# Patient Record
Sex: FEMALE | Race: WHITE | NOT HISPANIC OR LATINO | Employment: OTHER | URBAN - NONMETROPOLITAN AREA
[De-identification: names, ages, dates, MRNs, and addresses within clinical notes are randomized per-mention and may not be internally consistent; named-entity substitution may affect disease eponyms.]

---

## 2017-04-04 ENCOUNTER — LAB (OUTPATIENT)
Dept: LAB | Facility: HOSPITAL | Age: 77
End: 2017-04-04

## 2017-04-04 ENCOUNTER — TRANSCRIBE ORDERS (OUTPATIENT)
Dept: ADMINISTRATIVE | Facility: HOSPITAL | Age: 77
End: 2017-04-04

## 2017-04-04 DIAGNOSIS — E87.5 SERUM POTASSIUM ELEVATED: ICD-10-CM

## 2017-04-04 DIAGNOSIS — E87.5 SERUM POTASSIUM ELEVATED: Primary | ICD-10-CM

## 2017-04-04 LAB
ANION GAP SERPL CALCULATED.3IONS-SCNC: 5.9 MMOL/L (ref 3.6–11.2)
BUN BLD-MCNC: 19 MG/DL (ref 7–21)
BUN/CREAT SERPL: 23.5 (ref 7–25)
CALCIUM SPEC-SCNC: 10.2 MG/DL (ref 7.7–10)
CHLORIDE SERPL-SCNC: 103 MMOL/L (ref 99–112)
CO2 SERPL-SCNC: 29.1 MMOL/L (ref 24.3–31.9)
CREAT BLD-MCNC: 0.81 MG/DL (ref 0.43–1.29)
GFR SERPL CREATININE-BSD FRML MDRD: 68 ML/MIN/1.73
GLUCOSE BLD-MCNC: 171 MG/DL (ref 70–110)
OSMOLALITY SERPL CALC.SUM OF ELEC: 282 MOSM/KG (ref 273–305)
POTASSIUM BLD-SCNC: 4.6 MMOL/L (ref 3.5–5.3)
SODIUM BLD-SCNC: 138 MMOL/L (ref 135–153)

## 2017-04-04 PROCEDURE — 36415 COLL VENOUS BLD VENIPUNCTURE: CPT

## 2017-04-04 PROCEDURE — 80048 BASIC METABOLIC PNL TOTAL CA: CPT | Performed by: NURSE PRACTITIONER

## 2017-04-11 ENCOUNTER — TRANSCRIBE ORDERS (OUTPATIENT)
Dept: LAB | Facility: HOSPITAL | Age: 77
End: 2017-04-11

## 2017-04-11 ENCOUNTER — HOSPITAL ENCOUNTER (OUTPATIENT)
Dept: GENERAL RADIOLOGY | Facility: HOSPITAL | Age: 77
Discharge: HOME | End: 2017-04-11
Admitting: NURSE PRACTITIONER

## 2017-04-11 DIAGNOSIS — M54.5 LOW BACK PAIN, UNSPECIFIED BACK PAIN LATERALITY, UNSPECIFIED CHRONICITY, WITH SCIATICA PRESENCE UNSPECIFIED: ICD-10-CM

## 2017-04-11 DIAGNOSIS — M25.552 LEFT HIP PAIN: ICD-10-CM

## 2017-04-11 DIAGNOSIS — M54.5 LOW BACK PAIN, UNSPECIFIED BACK PAIN LATERALITY, UNSPECIFIED CHRONICITY, WITH SCIATICA PRESENCE UNSPECIFIED: Primary | ICD-10-CM

## 2017-04-11 PROCEDURE — 72110 X-RAY EXAM L-2 SPINE 4/>VWS: CPT

## 2017-04-11 PROCEDURE — 73502 X-RAY EXAM HIP UNI 2-3 VIEWS: CPT

## 2017-04-11 PROCEDURE — 73502 X-RAY EXAM HIP UNI 2-3 VIEWS: CPT | Performed by: RADIOLOGY

## 2017-04-11 PROCEDURE — 72110 X-RAY EXAM L-2 SPINE 4/>VWS: CPT | Performed by: RADIOLOGY

## 2017-06-10 ENCOUNTER — APPOINTMENT (OUTPATIENT)
Dept: GENERAL RADIOLOGY | Facility: HOSPITAL | Age: 77
End: 2017-06-10

## 2017-06-10 ENCOUNTER — HOSPITAL ENCOUNTER (EMERGENCY)
Facility: HOSPITAL | Age: 77
Discharge: HOME | End: 2017-06-10
Attending: EMERGENCY MEDICINE | Admitting: EMERGENCY MEDICINE

## 2017-06-10 VITALS
WEIGHT: 195 LBS | DIASTOLIC BLOOD PRESSURE: 74 MMHG | HEART RATE: 76 BPM | RESPIRATION RATE: 18 BRPM | OXYGEN SATURATION: 98 % | HEIGHT: 65 IN | BODY MASS INDEX: 32.49 KG/M2 | SYSTOLIC BLOOD PRESSURE: 141 MMHG | TEMPERATURE: 98.2 F

## 2017-06-10 DIAGNOSIS — I10 ESSENTIAL HYPERTENSION: Primary | ICD-10-CM

## 2017-06-10 LAB
ALBUMIN SERPL-MCNC: 3.7 G/DL (ref 3.4–4.8)
ALBUMIN/GLOB SERPL: 1.3 G/DL (ref 1.5–2.5)
ALP SERPL-CCNC: 96 U/L (ref 35–104)
ALT SERPL W P-5'-P-CCNC: 19 U/L (ref 10–36)
ANION GAP SERPL CALCULATED.3IONS-SCNC: 3.6 MMOL/L (ref 3.6–11.2)
AST SERPL-CCNC: 22 U/L (ref 10–30)
BASOPHILS # BLD AUTO: 0.07 10*3/MM3 (ref 0–0.3)
BASOPHILS NFR BLD AUTO: 1 % (ref 0–2)
BILIRUB SERPL-MCNC: 0.3 MG/DL (ref 0.2–1.8)
BUN BLD-MCNC: 22 MG/DL (ref 7–21)
BUN/CREAT SERPL: 40 (ref 7–25)
CALCIUM SPEC-SCNC: 10 MG/DL (ref 7.7–10)
CHLORIDE SERPL-SCNC: 100 MMOL/L (ref 99–112)
CO2 SERPL-SCNC: 29.4 MMOL/L (ref 24.3–31.9)
CREAT BLD-MCNC: 0.55 MG/DL (ref 0.43–1.29)
DEPRECATED RDW RBC AUTO: 40.8 FL (ref 37–54)
EOSINOPHIL # BLD AUTO: 0.45 10*3/MM3 (ref 0–0.7)
EOSINOPHIL NFR BLD AUTO: 6.2 % (ref 0–7)
ERYTHROCYTE [DISTWIDTH] IN BLOOD BY AUTOMATED COUNT: 13 % (ref 11.5–14.5)
GFR SERPL CREATININE-BSD FRML MDRD: 107 ML/MIN/1.73
GLOBULIN UR ELPH-MCNC: 2.8 GM/DL
GLUCOSE BLD-MCNC: 115 MG/DL (ref 70–110)
HCT VFR BLD AUTO: 33.9 % (ref 37–47)
HGB BLD-MCNC: 11.2 G/DL (ref 12–16)
IMM GRANULOCYTES # BLD: 0.03 10*3/MM3 (ref 0–0.03)
IMM GRANULOCYTES NFR BLD: 0.4 % (ref 0–0.5)
LYMPHOCYTES # BLD AUTO: 2.13 10*3/MM3 (ref 1–3)
LYMPHOCYTES NFR BLD AUTO: 29.2 % (ref 16–46)
MCH RBC QN AUTO: 28.9 PG (ref 27–33)
MCHC RBC AUTO-ENTMCNC: 33 G/DL (ref 33–37)
MCV RBC AUTO: 87.4 FL (ref 80–94)
MONOCYTES # BLD AUTO: 1.07 10*3/MM3 (ref 0.1–0.9)
MONOCYTES NFR BLD AUTO: 14.7 % (ref 0–12)
NEUTROPHILS # BLD AUTO: 3.54 10*3/MM3 (ref 1.4–6.5)
NEUTROPHILS NFR BLD AUTO: 48.5 % (ref 40–75)
OSMOLALITY SERPL CALC.SUM OF ELEC: 270.6 MOSM/KG (ref 273–305)
PLATELET # BLD AUTO: 313 10*3/MM3 (ref 130–400)
PMV BLD AUTO: 9.8 FL (ref 6–10)
POTASSIUM BLD-SCNC: 3.6 MMOL/L (ref 3.5–5.3)
PROT SERPL-MCNC: 6.5 G/DL (ref 6–8)
RBC # BLD AUTO: 3.88 10*6/MM3 (ref 4.2–5.4)
SODIUM BLD-SCNC: 133 MMOL/L (ref 135–153)
WBC NRBC COR # BLD: 7.29 10*3/MM3 (ref 4.5–12.5)

## 2017-06-10 PROCEDURE — 85025 COMPLETE CBC W/AUTO DIFF WBC: CPT | Performed by: EMERGENCY MEDICINE

## 2017-06-10 PROCEDURE — 99284 EMERGENCY DEPT VISIT MOD MDM: CPT

## 2017-06-10 PROCEDURE — 93005 ELECTROCARDIOGRAM TRACING: CPT | Performed by: EMERGENCY MEDICINE

## 2017-06-10 PROCEDURE — 93010 ELECTROCARDIOGRAM REPORT: CPT | Performed by: INTERNAL MEDICINE

## 2017-06-10 PROCEDURE — 80053 COMPREHEN METABOLIC PANEL: CPT | Performed by: EMERGENCY MEDICINE

## 2017-06-10 PROCEDURE — 71010 HC CHEST PA OR AP: CPT

## 2017-06-10 PROCEDURE — 71010 XR CHEST 1 VW: CPT | Performed by: RADIOLOGY

## 2017-06-10 RX ORDER — SODIUM CHLORIDE 0.9 % (FLUSH) 0.9 %
10 SYRINGE (ML) INJECTION AS NEEDED
Status: DISCONTINUED | OUTPATIENT
Start: 2017-06-10 | End: 2017-06-10 | Stop reason: HOSPADM

## 2017-06-10 NOTE — ED PROVIDER NOTES
Subjective   Patient is a 77 y.o. female presenting with shortness of breath.   History provided by:  Patient  Shortness of Breath   Severity:  Mild  Onset quality:  Gradual  Timing:  Constant  Progression:  Improving  Chronicity:  New  Context: activity    Relieved by:  Lying down  Worsened by:  Nothing  Ineffective treatments:  None tried  Associated symptoms: no abdominal pain, no chest pain and no fever        Review of Systems   Constitutional: Negative for fever.   HENT: Negative.    Respiratory: Positive for shortness of breath.    Cardiovascular: Negative.  Negative for chest pain.   Gastrointestinal: Negative.  Negative for abdominal pain.   Endocrine: Negative.    Genitourinary: Negative.  Negative for dysuria.   Skin: Negative.    Neurological: Positive for weakness.   Psychiatric/Behavioral: Negative.    All other systems reviewed and are negative.      No past medical history on file.    No Known Allergies    No past surgical history on file.    No family history on file.    Social History     Social History   • Marital status:      Spouse name: N/A   • Number of children: N/A   • Years of education: N/A     Social History Main Topics   • Smoking status: Not on file   • Smokeless tobacco: Not on file   • Alcohol use Not on file   • Drug use: Not on file   • Sexual activity: Not on file     Other Topics Concern   • Not on file     Social History Narrative   • No narrative on file           Objective   Physical Exam   Constitutional: She is oriented to person, place, and time. She appears well-developed and well-nourished. No distress.   HENT:   Head: Normocephalic and atraumatic.   Right Ear: External ear normal.   Left Ear: External ear normal.   Nose: Nose normal.   Eyes: Conjunctivae and EOM are normal. Pupils are equal, round, and reactive to light.   Neck: Normal range of motion. Neck supple. No JVD present. No tracheal deviation present.   Cardiovascular: Normal rate, regular rhythm and  normal heart sounds.    No murmur heard.  Pulmonary/Chest: Effort normal and breath sounds normal. No respiratory distress. She has no wheezes.   Abdominal: Soft. Bowel sounds are normal. There is no tenderness.   Musculoskeletal: Normal range of motion. She exhibits no edema or deformity.   Neurological: She is alert and oriented to person, place, and time. No cranial nerve deficit.   Skin: Skin is warm and dry. No rash noted. She is not diaphoretic. No erythema. No pallor.   Psychiatric: She has a normal mood and affect. Her behavior is normal. Thought content normal.   Nursing note and vitals reviewed.      Procedures         ED Course  ED Course                  MDM    Final diagnoses:   Essential hypertension            Nilesh Fischer MD  06/10/17 0214

## 2017-06-11 NOTE — ED NOTES
"Patient states she \"was at home laying down and then all the sudden I couldn't breath, it was like I just couldn't catch my breath.\" Patient states all symptoms have resolved upon assessment and does not voice any complaints at this time.      Camille Alfred RN  06/10/17 6656    "

## 2017-06-21 ENCOUNTER — TRANSCRIBE ORDERS (OUTPATIENT)
Dept: ADMINISTRATIVE | Facility: HOSPITAL | Age: 77
End: 2017-06-21

## 2017-06-21 DIAGNOSIS — R06.02 SOB (SHORTNESS OF BREATH): Primary | ICD-10-CM

## 2017-06-21 DIAGNOSIS — R94.31 ABNORMAL EKG: ICD-10-CM

## 2017-06-22 ENCOUNTER — HOSPITAL ENCOUNTER (OUTPATIENT)
Dept: CARDIOLOGY | Facility: HOSPITAL | Age: 77
Discharge: HOME | End: 2017-06-22
Admitting: NURSE PRACTITIONER

## 2017-06-22 DIAGNOSIS — R94.31 ABNORMAL EKG: ICD-10-CM

## 2017-06-22 DIAGNOSIS — R06.02 SOB (SHORTNESS OF BREATH): ICD-10-CM

## 2017-06-22 PROCEDURE — 93306 TTE W/DOPPLER COMPLETE: CPT

## 2017-06-22 PROCEDURE — 93306 TTE W/DOPPLER COMPLETE: CPT | Performed by: INTERNAL MEDICINE

## 2017-06-26 LAB
BH CV ECHO MEAS - ACS: 1.3 CM
BH CV ECHO MEAS - AO MAX PG (FULL): 3.9 MMHG
BH CV ECHO MEAS - AO MAX PG: 7.5 MMHG
BH CV ECHO MEAS - AO MEAN PG (FULL): 2.1 MMHG
BH CV ECHO MEAS - AO MEAN PG: 4.2 MMHG
BH CV ECHO MEAS - AO ROOT AREA (BSA CORRECTED): 1.4
BH CV ECHO MEAS - AO ROOT AREA: 5.7 CM^2
BH CV ECHO MEAS - AO ROOT DIAM: 2.7 CM
BH CV ECHO MEAS - AO V2 MAX: 137 CM/SEC
BH CV ECHO MEAS - AO V2 MEAN: 97.3 CM/SEC
BH CV ECHO MEAS - AO V2 VTI: 30.2 CM
BH CV ECHO MEAS - BSA(HAYCOCK): 2.1 M^2
BH CV ECHO MEAS - BSA: 2 M^2
BH CV ECHO MEAS - BZI_BMI: 32.7 KILOGRAMS/M^2
BH CV ECHO MEAS - BZI_METRIC_HEIGHT: 165 CM
BH CV ECHO MEAS - BZI_METRIC_WEIGHT: 89 KG
BH CV ECHO MEAS - EDV(CUBED): 120.4 ML
BH CV ECHO MEAS - EDV(MOD-SP2): 79.6 ML
BH CV ECHO MEAS - EDV(MOD-SP4): 97.1 ML
BH CV ECHO MEAS - EDV(TEICH): 114.8 ML
BH CV ECHO MEAS - EF(CUBED): 69.8 %
BH CV ECHO MEAS - EF(TEICH): 61.2 %
BH CV ECHO MEAS - ESV(CUBED): 36.3 ML
BH CV ECHO MEAS - ESV(TEICH): 44.5 ML
BH CV ECHO MEAS - FS: 32.9 %
BH CV ECHO MEAS - IVS/LVPW: 1
BH CV ECHO MEAS - IVSD: 1 CM
BH CV ECHO MEAS - LA DIMENSION: 3.3 CM
BH CV ECHO MEAS - LA/AO: 1.2
BH CV ECHO MEAS - LV DIASTOLIC VOL/BSA (35-75): 49.5 ML/M^2
BH CV ECHO MEAS - LV MASS(C)D: 177.2 GRAMS
BH CV ECHO MEAS - LV MASS(C)DI: 90.3 GRAMS/M^2
BH CV ECHO MEAS - LV MAX PG: 3.6 MMHG
BH CV ECHO MEAS - LV MEAN PG: 2.1 MMHG
BH CV ECHO MEAS - LV V1 MAX: 95.4 CM/SEC
BH CV ECHO MEAS - LV V1 MEAN: 69.2 CM/SEC
BH CV ECHO MEAS - LV V1 VTI: 20.9 CM
BH CV ECHO MEAS - LVIDD: 4.9 CM
BH CV ECHO MEAS - LVIDS: 3.3 CM
BH CV ECHO MEAS - LVPWD: 0.98 CM
BH CV ECHO MEAS - MV A MAX VEL: 102.6 CM/SEC
BH CV ECHO MEAS - MV DEC TIME: 0.19 SEC
BH CV ECHO MEAS - MV E MAX VEL: 76.2 CM/SEC
BH CV ECHO MEAS - MV E/A: 0.74
BH CV ECHO MEAS - MV MAX PG: 4.8 MMHG
BH CV ECHO MEAS - MV MEAN PG: 1.2 MMHG
BH CV ECHO MEAS - MV V2 MAX: 109 CM/SEC
BH CV ECHO MEAS - MV V2 MEAN: 48.8 CM/SEC
BH CV ECHO MEAS - MV V2 VTI: 23.9 CM
BH CV ECHO MEAS - PA ACC TIME: 0.12 SEC
BH CV ECHO MEAS - PA MAX PG: 6.1 MMHG
BH CV ECHO MEAS - PA MEAN PG: 3.1 MMHG
BH CV ECHO MEAS - PA PR(ACCEL): 24.3 MMHG
BH CV ECHO MEAS - PA V2 MAX: 123 CM/SEC
BH CV ECHO MEAS - PA V2 MEAN: 84.3 CM/SEC
BH CV ECHO MEAS - PA V2 VTI: 21.2 CM
BH CV ECHO MEAS - RAP SYSTOLE: 10 MMHG
BH CV ECHO MEAS - RVDD: 2.9 CM
BH CV ECHO MEAS - RVSP: 38.5 MMHG
BH CV ECHO MEAS - SI(AO): 88 ML/M^2
BH CV ECHO MEAS - SI(CUBED): 42.9 ML/M^2
BH CV ECHO MEAS - SI(TEICH): 35.9 ML/M^2
BH CV ECHO MEAS - SV(AO): 172.6 ML
BH CV ECHO MEAS - SV(CUBED): 84 ML
BH CV ECHO MEAS - SV(TEICH): 70.3 ML
BH CV ECHO MEAS - TR MAX VEL: 266.9 CM/SEC

## 2017-07-07 ENCOUNTER — OFFICE VISIT (OUTPATIENT)
Dept: CARDIOLOGY | Facility: CLINIC | Age: 77
End: 2017-07-07

## 2017-07-07 VITALS
WEIGHT: 192 LBS | DIASTOLIC BLOOD PRESSURE: 71 MMHG | SYSTOLIC BLOOD PRESSURE: 120 MMHG | HEART RATE: 79 BPM | RESPIRATION RATE: 18 BRPM | HEIGHT: 65 IN | OXYGEN SATURATION: 95 % | BODY MASS INDEX: 31.99 KG/M2

## 2017-07-07 DIAGNOSIS — I10 ESSENTIAL HYPERTENSION: ICD-10-CM

## 2017-07-07 DIAGNOSIS — I77.9 RIGHT-SIDED CAROTID ARTERY DISEASE (HCC): ICD-10-CM

## 2017-07-07 DIAGNOSIS — E11.59 TYPE 2 DIABETES MELLITUS WITH OTHER CIRCULATORY COMPLICATION, WITHOUT LONG-TERM CURRENT USE OF INSULIN (HCC): ICD-10-CM

## 2017-07-07 DIAGNOSIS — R06.09 DYSPNEA ON EXERTION: Primary | ICD-10-CM

## 2017-07-07 PROCEDURE — 99204 OFFICE O/P NEW MOD 45 MIN: CPT | Performed by: INTERNAL MEDICINE

## 2017-07-07 PROCEDURE — 93000 ELECTROCARDIOGRAM COMPLETE: CPT | Performed by: INTERNAL MEDICINE

## 2017-07-07 RX ORDER — NABUMETONE 750 MG/1
750 TABLET, FILM COATED ORAL 2 TIMES DAILY PRN
COMMUNITY
End: 2018-04-11

## 2017-07-07 RX ORDER — LORATADINE 10 MG/1
10 TABLET ORAL DAILY
Status: ON HOLD | COMMUNITY
End: 2019-01-12

## 2017-07-07 RX ORDER — LUBIPROSTONE 24 UG/1
24 CAPSULE ORAL 2 TIMES DAILY WITH MEALS
COMMUNITY
End: 2018-04-11

## 2017-07-07 RX ORDER — LISINOPRIL AND HYDROCHLOROTHIAZIDE 20; 12.5 MG/1; MG/1
1 TABLET ORAL DAILY
Status: ON HOLD | COMMUNITY
End: 2019-01-12

## 2017-07-07 RX ORDER — IBUPROFEN 200 MG
200 TABLET ORAL AS NEEDED
COMMUNITY
End: 2018-04-11

## 2017-07-07 RX ORDER — ATORVASTATIN CALCIUM 40 MG/1
40 TABLET, FILM COATED ORAL NIGHTLY
Status: ON HOLD | COMMUNITY
End: 2019-01-12

## 2017-07-07 RX ORDER — ASPIRIN 81 MG/1
81 TABLET ORAL
COMMUNITY

## 2017-07-07 RX ORDER — METOPROLOL SUCCINATE 25 MG/1
25 TABLET, EXTENDED RELEASE ORAL
Status: ON HOLD | COMMUNITY
End: 2018-08-18

## 2017-07-07 NOTE — PROGRESS NOTES
"Cristiana Sánchez, APRN  Jackie Erwin  1940 07/07/2017    Patient Active Problem List   Diagnosis   • Dyspnea on exertion associated with chest discomfort, could be angina equalant   • Type 2 diabetes mellitus with circulatory disorder, without long-term current use of insulin   • Essential hypertension   • Right-sided carotid artery disease, status post right carotid endarterectomy in 2011.       Dear Cristiana Sánchez, APRN:    Subjective     Jackie Erwin is a 77 y.o. female with the problems as listed above, presents    History of Present Illness:Ms. Erwin is a very pleasant 77-year-old  female with no previous history of known coronary artery disease but has history of right carotid disease for which she underwent right carotid endarterectomy in 2011, history of type 2 diabetes mellitus and hypertension and dyslipidemia and previous history of smoking although she quit in 1975, presents with complaints of having episodes of indigestion at night associated with shortness of breath and a feeling of \"can't get enough breath\".  She had 2 episodes in the last 3-4 weeks.  She denies any exertional chest pain or discomfort.  She has dyspnea with mild-to-moderate exertion with 1-2 pillow orthopnea.  She states that when she gets up with shortness of breath she has to sit down in chair be able to breathe adequately.  She also has been feeling tired a lot lately.  He complains of frequent dizziness and unsteady gait for a long time.  She denies any associated palpitations or any history of syncope.  She denies having had any strokes in the past except transient numbness on the left side of her body prior to her carotid endarterectomy.    Cardiac risk factors:diabetes mellitus, hypertension, peripheral vascular disease, smoking, Positive family Hx. of premature athersclerotivc disease. and Age and postmenopausal status.    No Known Allergies:      Current Outpatient Prescriptions:   •  aspirin 81 MG EC " tablet, Take 81 mg by mouth Daily., Disp: , Rfl:   •  atorvastatin (LIPITOR) 40 MG tablet, Take 40 mg by mouth Every Night., Disp: , Rfl:   •  ibuprofen (ADVIL,MOTRIN) 200 MG tablet, Take 200 mg by mouth As Needed for Mild Pain (Pain Score 1-3)., Disp: , Rfl:   •  lisinopril-hydrochlorothiazide (PRINZIDE,ZESTORETIC) 20-12.5 MG per tablet, Take 1 tablet by mouth Daily., Disp: , Rfl:   •  loratadine (CLARITIN) 10 MG tablet, Take 10 mg by mouth Daily., Disp: , Rfl:   •  lubiprostone (AMITIZA) 24 MCG capsule, Take 24 mcg by mouth 2 (Two) Times a Day With Meals., Disp: , Rfl:   •  metoprolol succinate XL (TOPROL-XL) 25 MG 24 hr tablet, Take 25 mg by mouth Daily., Disp: , Rfl:   •  nabumetone (RELAFEN) 750 MG tablet, Take 750 mg by mouth 2 (Two) Times a Day As Needed for Mild Pain (Pain Score 1-3)., Disp: , Rfl:     Past Medical History:   Diagnosis Date   • Diabetes mellitus    • Hyperlipidemia    • Hypertension      Past Surgical History:   Procedure Laterality Date   • APPENDECTOMY      6 yrs old   • CAROTID ENDARTERECTOMY     • HYSTERECTOMY     • INGUINAL HERNIA REPAIR  1970     Family History   Problem Relation Age of Onset   • Thrombosis Father      Social History   Substance Use Topics   • Smoking status: Former Smoker     Types: Cigarettes   • Smokeless tobacco: None   • Alcohol use No       Review of Systems   Constitution: Positive for decreased appetite, weakness and malaise/fatigue (has to sit and rest alot, ).   HENT: Negative for headaches, hearing loss and nosebleeds.         Lt ear problems. Drains at night.  Chronic sinus probles       Eyes: Negative for blurred vision and double vision.        Wears glasses     Cardiovascular: Positive for chest pain (2nd episode of dull pain,  not lasting long, ), dyspnea on exertion and orthopnea (gets bad she has to get up and sit in recliner). Negative for irregular heartbeat, leg swelling, near-syncope and palpitations.   Respiratory: Positive for cough, shortness  "of breath and wheezing.    Endocrine: Positive for cold intolerance and heat intolerance.   Skin: Positive for dry skin.   Musculoskeletal: Positive for arthritis, back pain (LBP), joint pain, joint swelling, muscle cramps, muscle weakness and stiffness.        Difficulty walking     Gastrointestinal: Positive for constipation and nausea. Negative for abdominal pain, change in bowel habit, diarrhea and vomiting. Heartburn: mostly every evening for the past couple weeks.   Genitourinary: Negative for bladder incontinence, dysuria and frequency.   Neurological: Positive for dizziness (\"feels like im always dizzy\"), loss of balance and numbness (bilat feet, ?neuropathy). Negative for light-headedness and tremors.   Psychiatric/Behavioral: Negative for depression. The patient has insomnia. The patient is not nervous/anxious.    Allergic/Immunologic: Positive for environmental allergies.       Objective   Blood pressure 120/71, pulse 79, resp. rate 18, height 65\" (165.1 cm), weight 192 lb (87.1 kg), SpO2 95 %.  Body mass index is 31.95 kg/(m^2).        Physical Exam   Constitutional: She is oriented to person, place, and time. She appears well-developed and well-nourished.   HENT:   Head: Normocephalic.   Eyes: Conjunctivae and EOM are normal.   Neck: Normal range of motion. Neck supple. No JVD present. No tracheal deviation present. No thyromegaly present.   Cardiovascular: Normal rate, regular rhythm, S1 normal and S2 normal.  Exam reveals no gallop, no S3, no S4 and no friction rub.    No murmur heard.  Pulses:       Carotid pulses are 2+ on the right side, and 2+ on the left side.       Radial pulses are 2+ on the right side, and 2+ on the left side.        Femoral pulses are 2+ on the right side, and 2+ on the left side.       Popliteal pulses are 2+ on the right side, and 2+ on the left side.        Dorsalis pedis pulses are 2+ on the right side, and 2+ on the left side.        Posterior tibial pulses are 2+ on the " right side, and 2+ on the left side.   Pulmonary/Chest: Breath sounds normal. No respiratory distress. She has no wheezes. She has no rales.   Abdominal: Soft. Bowel sounds are normal. She exhibits no mass. There is no tenderness.   Musculoskeletal: She exhibits edema (mild chronic nonpitting edema on both legs.).   Neurological: She is alert and oriented to person, place, and time. No cranial nerve deficit.   Skin: Skin is warm and dry.   Psychiatric: She has a normal mood and affect.       Lab Results   Component Value Date     (L) 06/10/2017    K 3.6 06/10/2017     06/10/2017    CO2 29.4 06/10/2017    BUN 22 (H) 06/10/2017    CREATININE 0.55 06/10/2017    GLUCOSE 115 (H) 06/10/2017    CALCIUM 10.0 06/10/2017    AST 22 06/10/2017    ALT 19 06/10/2017    ALKPHOS 96 06/10/2017    LABIL2 1.3 (L) 06/10/2017     Lab Results   Component Value Date    CKTOTAL 28 12/17/2015     Lab Results   Component Value Date    WBC 7.29 06/10/2017    HGB 11.2 (L) 06/10/2017    HCT 33.9 (L) 06/10/2017     06/10/2017     No results found for: MG  Lab Results   Component Value Date    TSH 15.309 (H) 12/17/2015    CHLPL 244 (H) 12/17/2015    TRIG 124 12/17/2015    HDL 47 (L) 12/17/2015     (H) 12/17/2015      Lab Results   Component Value Date    BNP 15 12/17/2015     Echo Doppler study 6/22/17 revealed:  · Normal left ventricular cavity size and wall thickness noted. All left ventricular wall segments contract normally.  · Estimated EF appears to be in the range of 61 - 65%.  · The aortic valve is structurally normal. No aortic valve regurgitation is present. No aortic valve stenosis is present.  · The mitral valve is normal in structure. Mild mitral valve regurgitation is present.  · The tricuspid valve is normal. No tricuspid valve stenosis is present. Mild tricuspid valve regurgitation is present. Estimated right ventricular systolic pressure from tricuspid regurgitation is mildly elevated (35-45  mmHg).  · There is no evidence of pericardial effusion.    ECG 12 Lead  Date/Time: 7/7/2017 10:31 AM  Performed by: CISCO TIERNEY  Authorized by: CISCO TIERNEY   Rhythm: sinus rhythm  BPM: 71  Conduction: conduction normal  ST Segments: ST segments normal  Other: no other findings  Clinical impression: normal ECG  Comments: QTC of 401 ms.            Assessment/Plan   Diagnoses and all orders for this visit:    Dyspnea on exertion associated with chest discomfort, could be angina equalant  Type 2 diabetes mellitus with other circulatory complication, without long-term current use of insulin  Right-sided carotid artery disease, status post right carotid endarterectomy in 2011.  Essential hypertension  Recurrent dizziness.       Recommendations:    1. Continue with aspirin, atorvastatin and metoprolol.  2. Evaluate further with treadmill /lexiscan sestamibi study and 24-hour Holter monitor.  3. Follow-up in 2-3 weeks.    Return in about 3 weeks (around 7/28/2017).    As always, I appreciate very much the opportunity to participate in the cardiovascular care of your patients.      With Best Regards,    Cisco Tierney MD, FACC

## 2017-08-21 ENCOUNTER — TRANSCRIBE ORDERS (OUTPATIENT)
Dept: ADMINISTRATIVE | Facility: HOSPITAL | Age: 77
End: 2017-08-21

## 2017-08-21 DIAGNOSIS — M81.0 OSTEOPOROSIS, UNSPECIFIED: Primary | ICD-10-CM

## 2017-08-23 ENCOUNTER — HOSPITAL ENCOUNTER (OUTPATIENT)
Dept: BONE DENSITY | Facility: HOSPITAL | Age: 77
Discharge: HOME | End: 2017-08-23
Admitting: NURSE PRACTITIONER

## 2017-08-23 DIAGNOSIS — M81.0 OSTEOPOROSIS, UNSPECIFIED: ICD-10-CM

## 2017-08-23 PROCEDURE — 77080 DXA BONE DENSITY AXIAL: CPT | Performed by: RADIOLOGY

## 2017-08-23 PROCEDURE — 77080 DXA BONE DENSITY AXIAL: CPT

## 2017-10-06 ENCOUNTER — HOSPITAL ENCOUNTER (EMERGENCY)
Facility: HOSPITAL | Age: 77
Discharge: HOME | End: 2017-10-06
Admitting: EMERGENCY MEDICINE

## 2017-10-06 VITALS
OXYGEN SATURATION: 98 % | SYSTOLIC BLOOD PRESSURE: 151 MMHG | WEIGHT: 190 LBS | RESPIRATION RATE: 16 BRPM | DIASTOLIC BLOOD PRESSURE: 88 MMHG | HEART RATE: 79 BPM | TEMPERATURE: 97.8 F | BODY MASS INDEX: 32.44 KG/M2 | HEIGHT: 64 IN
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
## 2017-10-06 PROCEDURE — 90471 IMMUNIZATION ADMIN: CPT | Performed by: NURSE PRACTITIONER

## 2017-10-06 PROCEDURE — 90715 TDAP VACCINE 7 YRS/> IM: CPT | Performed by: NURSE PRACTITIONER

## 2017-10-06 PROCEDURE — 25010000002 TDAP 5-2.5-18.5 LF-MCG/0.5 SUSPENSION: Performed by: NURSE PRACTITIONER

## 2017-10-06 PROCEDURE — 99283 EMERGENCY DEPT VISIT LOW MDM: CPT

## 2017-10-06 RX ORDER — AMOXICILLIN AND CLAVULANATE POTASSIUM 875; 125 MG/1; MG/1
1 TABLET, FILM COATED ORAL 2 TIMES DAILY
Qty: 20 TABLET | Refills: 0 | Status: SHIPPED | OUTPATIENT
Start: 2017-10-06 | End: 2018-04-11

## 2017-10-06 RX ORDER — AMOXICILLIN AND CLAVULANATE POTASSIUM 875; 125 MG/1; MG/1
1 TABLET, FILM COATED ORAL ONCE
Status: COMPLETED | OUTPATIENT
Start: 2017-10-06 | End: 2017-10-06

## 2017-10-06 RX ADMIN — AMOXICILLIN AND CLAVULANATE POTASSIUM 1 TABLET: 875; 125 TABLET, FILM COATED ORAL at 14:00

## 2017-10-06 RX ADMIN — TETANUS TOXOID, REDUCED DIPHTHERIA TOXOID AND ACELLULAR PERTUSSIS VACCINE, ADSORBED 0.5 ML: 5; 2.5; 8; 8; 2.5 SUSPENSION INTRAMUSCULAR at 14:01

## 2017-10-06 NOTE — ED PROVIDER NOTES
Subjective   Patient is a 77 y.o. female presenting with animal bite.   History provided by:  Patient   used: No    Animal Bite   Contact animal:  Cat  Location:  Hand and leg  Hand injury location:  R fingers  Leg injury location:  R ankle  Time since incident:  1 hour  Pain details:     Quality:  Sharp    Severity:  Moderate    Progression:  Worsening  Incident location:  Another residence  Provoked: provoked    Notifications:  None  Animal's rabies vaccination status:  Unknown  Animal in possession: yes    Tetanus status:  Unknown  Relieved by:  Nothing  Worsened by:  Nothing  Ineffective treatments:  None tried  Associated symptoms: no fever, no numbness, no rash and no swelling        Review of Systems   Constitutional: Negative.  Negative for fever.   HENT: Negative.    Eyes: Negative.    Respiratory: Negative.    Cardiovascular: Negative.    Gastrointestinal: Negative.    Endocrine: Negative.    Genitourinary: Negative.    Musculoskeletal: Negative.    Skin: Negative.  Negative for rash.   Allergic/Immunologic: Negative.    Neurological: Negative.  Negative for numbness.   Hematological: Negative.    Psychiatric/Behavioral: Negative.        Past Medical History:   Diagnosis Date   • Diabetes mellitus    • Hyperlipidemia    • Hypertension        No Known Allergies    Past Surgical History:   Procedure Laterality Date   • APPENDECTOMY      6 yrs old   • CAROTID ENDARTERECTOMY     • HYSTERECTOMY     • INGUINAL HERNIA REPAIR  1970       Family History   Problem Relation Age of Onset   • Thrombosis Father        Social History     Social History   • Marital status:      Spouse name: N/A   • Number of children: N/A   • Years of education: N/A     Social History Main Topics   • Smoking status: Former Smoker     Types: Cigarettes   • Smokeless tobacco: None   • Alcohol use No   • Drug use: No   • Sexual activity: Defer     Other Topics Concern   • None     Social History Narrative            Objective   Physical Exam   Constitutional: She is oriented to person, place, and time. She appears well-developed and well-nourished.   HENT:   Head: Normocephalic.   Right Ear: External ear normal.   Left Ear: External ear normal.   Mouth/Throat: Oropharynx is clear and moist.   Eyes: EOM are normal. Pupils are equal, round, and reactive to light.   Neck: Normal range of motion. Neck supple.   Cardiovascular: Normal rate and regular rhythm.    Pulmonary/Chest: Effort normal and breath sounds normal.   Abdominal: Soft. Bowel sounds are normal.   Musculoskeletal: Normal range of motion.   Neurological: She is alert and oriented to person, place, and time.   Skin: Skin is warm and dry.   Multiple puncture wounds on right ankle medially and laterally with superficial abrasions/scratch marks on medial right ankle and bottom of foot.  Skin avulsion with undelrying tissue exposure on right index finger on proximal phalanx on palmar side   Psychiatric: She has a normal mood and affect. Her behavior is normal.   Nursing note and vitals reviewed.      Procedures         ED Course  ED Course                  MDM    Final diagnoses:   Cat bite, initial encounter            Ernesto Mix, APRN  10/11/17 1148

## 2017-10-06 NOTE — ED NOTES
BEFORE DISCHARGE SOCKS PUT ON TO KEEP DRESSINGS CLEAN.  ASSIST TO MISSAEL Chiu RN  10/06/17 5994

## 2017-10-06 NOTE — ED NOTES
Animal bite form faxed to [facility]     Clare Chiu RN  10/06/17 4544

## 2017-10-06 NOTE — ED NOTES
States her cat attacked her and injury to right index finger and right ankle and foot.  Happen this morning. Dried blood noted on finger and rigtht ankle.  Possibly also scratches on left lower leg.  To be cleaned and further inspection.     Clare Chiu RN  10/06/17 7454

## 2018-04-11 ENCOUNTER — HOSPITAL ENCOUNTER (EMERGENCY)
Facility: HOSPITAL | Age: 78
Discharge: HOME | End: 2018-04-11
Attending: EMERGENCY MEDICINE | Admitting: EMERGENCY MEDICINE

## 2018-04-11 VITALS
HEART RATE: 77 BPM | RESPIRATION RATE: 16 BRPM | BODY MASS INDEX: 34.15 KG/M2 | DIASTOLIC BLOOD PRESSURE: 71 MMHG | HEIGHT: 64 IN | TEMPERATURE: 98.5 F | SYSTOLIC BLOOD PRESSURE: 150 MMHG | WEIGHT: 200 LBS | OXYGEN SATURATION: 95 %

## 2018-04-11 DIAGNOSIS — E86.0 DEHYDRATION, MILD: Primary | ICD-10-CM

## 2018-04-11 DIAGNOSIS — E87.6 HYPOKALEMIA: ICD-10-CM

## 2018-04-11 LAB
ALBUMIN SERPL-MCNC: 3.9 G/DL (ref 3.4–4.8)
ALBUMIN/GLOB SERPL: 1.4 G/DL (ref 1.5–2.5)
ALP SERPL-CCNC: 89 U/L (ref 35–104)
ALT SERPL W P-5'-P-CCNC: 31 U/L (ref 10–36)
ANION GAP SERPL CALCULATED.3IONS-SCNC: 6.6 MMOL/L (ref 3.6–11.2)
AST SERPL-CCNC: 27 U/L (ref 10–30)
BACTERIA UR QL AUTO: ABNORMAL /HPF
BASOPHILS # BLD AUTO: 0.02 10*3/MM3 (ref 0–0.3)
BASOPHILS NFR BLD AUTO: 0.4 % (ref 0–2)
BILIRUB SERPL-MCNC: 0.5 MG/DL (ref 0.2–1.8)
BILIRUB UR QL STRIP: NEGATIVE
BUN BLD-MCNC: 37 MG/DL (ref 7–21)
BUN/CREAT SERPL: 44 (ref 7–25)
CALCIUM SPEC-SCNC: 9.5 MG/DL (ref 7.7–10)
CHLORIDE SERPL-SCNC: 108 MMOL/L (ref 99–112)
CLARITY UR: ABNORMAL
CO2 SERPL-SCNC: 22.4 MMOL/L (ref 24.3–31.9)
COLOR UR: ABNORMAL
CREAT BLD-MCNC: 0.84 MG/DL (ref 0.43–1.29)
DEPRECATED RDW RBC AUTO: 47.1 FL (ref 37–54)
EOSINOPHIL # BLD AUTO: 0.19 10*3/MM3 (ref 0–0.7)
EOSINOPHIL NFR BLD AUTO: 3.5 % (ref 0–7)
ERYTHROCYTE [DISTWIDTH] IN BLOOD BY AUTOMATED COUNT: 14.6 % (ref 11.5–14.5)
GFR SERPL CREATININE-BSD FRML MDRD: 65 ML/MIN/1.73
GLOBULIN UR ELPH-MCNC: 2.7 GM/DL
GLUCOSE BLD-MCNC: 103 MG/DL (ref 70–110)
GLUCOSE UR STRIP-MCNC: NEGATIVE MG/DL
HCT VFR BLD AUTO: 40.6 % (ref 37–47)
HGB BLD-MCNC: 13.2 G/DL (ref 12–16)
HGB UR QL STRIP.AUTO: ABNORMAL
HYALINE CASTS UR QL AUTO: ABNORMAL /LPF
IMM GRANULOCYTES # BLD: 0.02 10*3/MM3 (ref 0–0.03)
IMM GRANULOCYTES NFR BLD: 0.4 % (ref 0–0.5)
KETONES UR QL STRIP: ABNORMAL
LEUKOCYTE ESTERASE UR QL STRIP.AUTO: NEGATIVE
LYMPHOCYTES # BLD AUTO: 0.79 10*3/MM3 (ref 1–3)
LYMPHOCYTES NFR BLD AUTO: 14.7 % (ref 16–46)
MCH RBC QN AUTO: 28.8 PG (ref 27–33)
MCHC RBC AUTO-ENTMCNC: 32.5 G/DL (ref 33–37)
MCV RBC AUTO: 88.5 FL (ref 80–94)
MONOCYTES # BLD AUTO: 0.94 10*3/MM3 (ref 0.1–0.9)
MONOCYTES NFR BLD AUTO: 17.4 % (ref 0–12)
NEUTROPHILS # BLD AUTO: 3.43 10*3/MM3 (ref 1.4–6.5)
NEUTROPHILS NFR BLD AUTO: 63.6 % (ref 40–75)
NITRITE UR QL STRIP: NEGATIVE
OSMOLALITY SERPL CALC.SUM OF ELEC: 282.8 MOSM/KG (ref 273–305)
PH UR STRIP.AUTO: <=5 [PH] (ref 5–8)
PLATELET # BLD AUTO: 244 10*3/MM3 (ref 130–400)
PMV BLD AUTO: 10.2 FL (ref 6–10)
POTASSIUM BLD-SCNC: 3.4 MMOL/L (ref 3.5–5.3)
PROT SERPL-MCNC: 6.6 G/DL (ref 6–8)
PROT UR QL STRIP: NEGATIVE
RBC # BLD AUTO: 4.59 10*6/MM3 (ref 4.2–5.4)
RBC # UR: ABNORMAL /HPF
REF LAB TEST METHOD: ABNORMAL
SODIUM BLD-SCNC: 137 MMOL/L (ref 135–153)
SP GR UR STRIP: 1.02 (ref 1–1.03)
SQUAMOUS #/AREA URNS HPF: ABNORMAL /HPF
UROBILINOGEN UR QL STRIP: ABNORMAL
WBC NRBC COR # BLD: 5.39 10*3/MM3 (ref 4.5–12.5)
WBC UR QL AUTO: ABNORMAL /HPF

## 2018-04-11 PROCEDURE — 80053 COMPREHEN METABOLIC PANEL: CPT | Performed by: EMERGENCY MEDICINE

## 2018-04-11 PROCEDURE — 25010000002 MAGNESIUM SULFATE PER 500 MG OF MAGNESIUM: Performed by: EMERGENCY MEDICINE

## 2018-04-11 PROCEDURE — 81001 URINALYSIS AUTO W/SCOPE: CPT | Performed by: EMERGENCY MEDICINE

## 2018-04-11 PROCEDURE — 99284 EMERGENCY DEPT VISIT MOD MDM: CPT

## 2018-04-11 PROCEDURE — 93010 ELECTROCARDIOGRAM REPORT: CPT | Performed by: INTERNAL MEDICINE

## 2018-04-11 PROCEDURE — 25010000002 THIAMINE PER 100 MG: Performed by: EMERGENCY MEDICINE

## 2018-04-11 PROCEDURE — 36415 COLL VENOUS BLD VENIPUNCTURE: CPT

## 2018-04-11 PROCEDURE — 96361 HYDRATE IV INFUSION ADD-ON: CPT

## 2018-04-11 PROCEDURE — 93005 ELECTROCARDIOGRAM TRACING: CPT | Performed by: EMERGENCY MEDICINE

## 2018-04-11 PROCEDURE — 96365 THER/PROPH/DIAG IV INF INIT: CPT

## 2018-04-11 PROCEDURE — 85025 COMPLETE CBC W/AUTO DIFF WBC: CPT | Performed by: EMERGENCY MEDICINE

## 2018-04-11 RX ORDER — POTASSIUM CHLORIDE 20 MEQ/1
20 TABLET, EXTENDED RELEASE ORAL ONCE
Status: COMPLETED | OUTPATIENT
Start: 2018-04-11 | End: 2018-04-11

## 2018-04-11 RX ORDER — POTASSIUM CHLORIDE 750 MG/1
10 TABLET, FILM COATED, EXTENDED RELEASE ORAL DAILY
Qty: 6 TABLET | Refills: 0 | Status: ON HOLD | OUTPATIENT
Start: 2018-04-11 | End: 2018-08-18

## 2018-04-11 RX ORDER — DIPHENOXYLATE HYDROCHLORIDE AND ATROPINE SULFATE 2.5; .025 MG/1; MG/1
1 TABLET ORAL ONCE
Status: COMPLETED | OUTPATIENT
Start: 2018-04-11 | End: 2018-04-11

## 2018-04-11 RX ORDER — DIPHENOXYLATE HYDROCHLORIDE AND ATROPINE SULFATE 2.5; .025 MG/1; MG/1
1 TABLET ORAL 4 TIMES DAILY PRN
Qty: 12 TABLET | Refills: 0 | Status: ON HOLD | OUTPATIENT
Start: 2018-04-11 | End: 2018-08-18

## 2018-04-11 RX ADMIN — POTASSIUM CHLORIDE 20 MEQ: 1500 TABLET, EXTENDED RELEASE ORAL at 15:51

## 2018-04-11 RX ADMIN — DIPHENOXYLATE HYDROCHLORIDE AND ATROPINE SULFATE 1 TABLET: 2.5; .025 TABLET ORAL at 14:17

## 2018-04-11 RX ADMIN — SODIUM CHLORIDE 1000 ML: 9 INJECTION, SOLUTION INTRAVENOUS at 14:13

## 2018-04-11 RX ADMIN — THIAMINE HYDROCHLORIDE 1000 ML/HR: 100 INJECTION, SOLUTION INTRAMUSCULAR; INTRAVENOUS at 16:11

## 2018-04-11 NOTE — ED PROVIDER NOTES
Subjective     History provided by:  Patient  Diarrhea   The primary symptoms include diarrhea. The illness began 2 days ago. The onset was gradual.   Associated medical issues do not include inflammatory bowel disease, GERD or gallstones.       Review of Systems   Constitutional: Positive for activity change and appetite change.   HENT: Negative.    Eyes: Negative.    Respiratory: Negative.    Cardiovascular: Negative.    Gastrointestinal: Positive for diarrhea.   Endocrine: Negative.    Genitourinary: Negative.    Musculoskeletal: Negative.    Skin: Negative.    Allergic/Immunologic: Negative.    Neurological: Negative.    Hematological: Negative.    Psychiatric/Behavioral: Negative.        Past Medical History:   Diagnosis Date   • Diabetes mellitus    • Disease of thyroid gland    • Hyperlipidemia    • Hypertension        No Known Allergies    Past Surgical History:   Procedure Laterality Date   • APPENDECTOMY      6 yrs old   • CAROTID ENDARTERECTOMY     • HYSTERECTOMY     • INGUINAL HERNIA REPAIR  1970       Family History   Problem Relation Age of Onset   • Thrombosis Father        Social History     Social History   • Marital status:      Social History Main Topics   • Smoking status: Former Smoker     Types: Cigarettes   • Smokeless tobacco: Never Used   • Alcohol use No   • Drug use: No   • Sexual activity: Defer     Other Topics Concern   • Not on file           Objective   Physical Exam   Constitutional: She appears well-developed and well-nourished.   HENT:   Head: Normocephalic and atraumatic.   Eyes: EOM are normal. Pupils are equal, round, and reactive to light.   Neck: Normal range of motion. Neck supple.   Cardiovascular: Normal rate.    Pulmonary/Chest: Effort normal.   Abdominal: Soft.   Musculoskeletal: Normal range of motion.   Neurological: She is alert.   Skin: Skin is warm.   Psychiatric: She has a normal mood and affect.   Nursing note and vitals reviewed.      Procedures         ED  Course  ED Course                  MDM    Final diagnoses:   Dehydration, mild   Hypokalemia            Nilesh Fischer MD  04/11/18 2616

## 2018-06-14 ENCOUNTER — HOSPITAL ENCOUNTER (EMERGENCY)
Facility: HOSPITAL | Age: 78
Discharge: HOME | End: 2018-06-14
Attending: EMERGENCY MEDICINE | Admitting: EMERGENCY MEDICINE

## 2018-06-14 VITALS
HEIGHT: 65 IN | RESPIRATION RATE: 18 BRPM | BODY MASS INDEX: 32.65 KG/M2 | WEIGHT: 196 LBS | DIASTOLIC BLOOD PRESSURE: 74 MMHG | OXYGEN SATURATION: 98 % | HEART RATE: 70 BPM | TEMPERATURE: 98 F | SYSTOLIC BLOOD PRESSURE: 150 MMHG

## 2018-06-14 DIAGNOSIS — G62.9 NEUROPATHY: Primary | ICD-10-CM

## 2018-06-14 PROCEDURE — 99283 EMERGENCY DEPT VISIT LOW MDM: CPT

## 2018-06-14 RX ORDER — HYDROCODONE BITARTRATE AND ACETAMINOPHEN 7.5; 325 MG/1; MG/1
1 TABLET ORAL ONCE
Status: COMPLETED | OUTPATIENT
Start: 2018-06-14 | End: 2018-06-14

## 2018-06-14 RX ADMIN — HYDROCODONE BITARTRATE AND ACETAMINOPHEN 1 TABLET: 7.5; 325 TABLET ORAL at 12:41

## 2018-06-14 NOTE — ED PROVIDER NOTES
"Subjective   Pt is being followed by her PCP and neurologist and being worked up for neuropathy of BLE        History provided by:  Patient   used: No    Lower Extremity Issue   Location:  Leg and foot  Leg location:  L lower leg and R lower leg  Foot location:  L foot and R foot  Pain details:     Quality:  Tingling (cold, \"pins and needles\" sensation )    Radiates to:  Does not radiate    Severity:  Moderate    Onset quality:  Gradual    Duration: per pt, \"a while\" several weeks     Timing:  Constant    Progression:  Worsening  Chronicity:  Chronic  Dislocation: no    Foreign body present:  No foreign bodies  Tetanus status:  Up to date  Prior injury to area:  No  Relieved by:  None tried  Worsened by:  Activity and bearing weight  Ineffective treatments:  None tried  Associated symptoms: tingling    Associated symptoms: no decreased ROM, no fever and no swelling    Risk factors: obesity    Risk factors: no concern for non-accidental trauma and no frequent fractures        Review of Systems   Constitutional: Negative for activity change and fever.   HENT: Negative for congestion, rhinorrhea and sore throat.    Eyes: Negative for pain.   Respiratory: Negative for cough, shortness of breath and wheezing.    Cardiovascular: Negative for chest pain.   Gastrointestinal: Negative for abdominal distention, diarrhea, nausea and vomiting.   Genitourinary: Negative for difficulty urinating and dysuria.   Musculoskeletal: Negative for arthralgias and myalgias.   Skin: Negative for rash and wound.   Neurological: Negative for dizziness and headaches.   Psychiatric/Behavioral: Negative for agitation.   All other systems reviewed and are negative.      Past Medical History:   Diagnosis Date   • Diabetes mellitus    • Disease of thyroid gland    • Hyperlipidemia    • Hypertension        No Known Allergies    Past Surgical History:   Procedure Laterality Date   • APPENDECTOMY      6 yrs old   • CAROTID " ENDARTERECTOMY     • HYSTERECTOMY     • INGUINAL HERNIA REPAIR  1970       Family History   Problem Relation Age of Onset   • Thrombosis Father        Social History     Social History   • Marital status:      Social History Main Topics   • Smoking status: Former Smoker     Types: Cigarettes   • Smokeless tobacco: Never Used   • Alcohol use No   • Drug use: No   • Sexual activity: Defer     Other Topics Concern   • Not on file           Objective   Physical Exam   Constitutional: She is oriented to person, place, and time. She appears well-developed and well-nourished.   HENT:   Head: Normocephalic and atraumatic.   Eyes: EOM are normal. Pupils are equal, round, and reactive to light.   Neck: Normal range of motion. Neck supple.   Cardiovascular: Normal rate, regular rhythm and normal heart sounds.    Pulmonary/Chest: Effort normal and breath sounds normal.   Abdominal: Soft. Bowel sounds are normal.   Musculoskeletal: Normal range of motion.   Neurological: She is alert and oriented to person, place, and time.   Skin: Skin is warm and dry.   DP, PT, and popliteal pulses in BLE are palpable, strong. Extremities are warm to touch.    Psychiatric: She has a normal mood and affect. Her behavior is normal. Judgment and thought content normal.   Nursing note and vitals reviewed.      Procedures           ED Course  ED Course as of Jun 14 1524   Thu Jun 14, 2018   1227 Pt being followed by her PCP and neurologist and being worked up for neuropathy, but states her pain got too intense to control at home today. She has paresthesias and a cold sensation to BLE. Her physical exam is negative for any acute findings, BLE have good DP, PT and popliteal pulses. Both extremities are warm to touch. Will treat patient's pain and have her follow up with her PCP and neurologist as scheduled.   [KARINA]      ED Course User Index  [KARINA] MATTHEW Crystal                  MDM  Number of Diagnoses or Management Options     Amount  and/or Complexity of Data Reviewed  Clinical lab tests: reviewed and ordered  Tests in the radiology section of CPT®: ordered and reviewed  Tests in the medicine section of CPT®: ordered and reviewed    Patient Progress  Patient progress: stable        Final diagnoses:   Neuropathy            MATTHEW Crystal  06/14/18 1527

## 2018-08-18 ENCOUNTER — HOSPITAL ENCOUNTER (OUTPATIENT)
Facility: HOSPITAL | Age: 78
Setting detail: OBSERVATION
Discharge: HOME | End: 2018-08-19
Attending: EMERGENCY MEDICINE | Admitting: EMERGENCY MEDICINE

## 2018-08-18 ENCOUNTER — APPOINTMENT (OUTPATIENT)
Dept: GENERAL RADIOLOGY | Facility: HOSPITAL | Age: 78
End: 2018-08-18

## 2018-08-18 ENCOUNTER — APPOINTMENT (OUTPATIENT)
Dept: CT IMAGING | Facility: HOSPITAL | Age: 78
End: 2018-08-18

## 2018-08-18 ENCOUNTER — APPOINTMENT (OUTPATIENT)
Dept: CARDIOLOGY | Facility: HOSPITAL | Age: 78
End: 2018-08-18

## 2018-08-18 DIAGNOSIS — R07.9 CHEST PAIN, UNSPECIFIED TYPE: Primary | ICD-10-CM

## 2018-08-18 PROBLEM — K21.9 HIATAL HERNIA WITH GASTROESOPHAGEAL REFLUX: Status: ACTIVE | Noted: 2018-08-18

## 2018-08-18 PROBLEM — K44.9 HIATAL HERNIA WITH GASTROESOPHAGEAL REFLUX: Status: ACTIVE | Noted: 2018-08-18

## 2018-08-18 LAB
A-A DO2: 28.7 MMHG (ref 0–300)
ALBUMIN SERPL-MCNC: 4.1 G/DL (ref 3.4–4.8)
ALBUMIN/GLOB SERPL: 1.6 G/DL (ref 1.5–2.5)
ALP SERPL-CCNC: 84 U/L (ref 35–104)
ALT SERPL W P-5'-P-CCNC: 14 U/L (ref 10–36)
ANION GAP SERPL CALCULATED.3IONS-SCNC: 10.2 MMOL/L (ref 3.6–11.2)
ARTERIAL PATENCY WRIST A: ABNORMAL
AST SERPL-CCNC: 18 U/L (ref 10–30)
ATMOSPHERIC PRESS: 726 MMHG
BACTERIA UR QL AUTO: ABNORMAL /HPF
BASE EXCESS BLDA CALC-SCNC: 0.3 MMOL/L
BASOPHILS # BLD AUTO: 0.07 10*3/MM3 (ref 0–0.3)
BASOPHILS NFR BLD AUTO: 0.8 % (ref 0–2)
BDY SITE: ABNORMAL
BILIRUB SERPL-MCNC: 0.4 MG/DL (ref 0.2–1.8)
BILIRUB UR QL STRIP: NEGATIVE
BNP SERPL-MCNC: 24 PG/ML (ref 0–100)
BODY TEMPERATURE: 98.6 C
BUN BLD-MCNC: 17 MG/DL (ref 7–21)
BUN/CREAT SERPL: 21.8 (ref 7–25)
CALCIUM SPEC-SCNC: 9.9 MG/DL (ref 7.7–10)
CHLORIDE SERPL-SCNC: 103 MMOL/L (ref 99–112)
CK MB SERPL-CCNC: 0.53 NG/ML (ref 0–5)
CK MB SERPL-CCNC: <0.18 NG/ML (ref 0–5)
CK MB SERPL-RTO: 1.4 % (ref 0–3)
CK MB SERPL-RTO: NORMAL % (ref 0–3)
CK SERPL-CCNC: 25 U/L (ref 24–173)
CK SERPL-CCNC: 25 U/L (ref 24–173)
CK SERPL-CCNC: 32 U/L (ref 24–173)
CK SERPL-CCNC: 38 U/L (ref 24–173)
CLARITY UR: CLEAR
CO2 SERPL-SCNC: 23.8 MMOL/L (ref 24.3–31.9)
COHGB MFR BLD: 1.3 % (ref 0–5)
COLOR UR: YELLOW
CREAT BLD-MCNC: 0.78 MG/DL (ref 0.43–1.29)
D DIMER PPP FEU-MCNC: 1.07 MCGFEU/ML (ref 0–0.5)
DEPRECATED RDW RBC AUTO: 44 FL (ref 37–54)
EOSINOPHIL # BLD AUTO: 1.09 10*3/MM3 (ref 0–0.7)
EOSINOPHIL NFR BLD AUTO: 12.4 % (ref 0–7)
ERYTHROCYTE [DISTWIDTH] IN BLOOD BY AUTOMATED COUNT: 13.8 % (ref 11.5–14.5)
GFR SERPL CREATININE-BSD FRML MDRD: 71 ML/MIN/1.73
GLOBULIN UR ELPH-MCNC: 2.5 GM/DL
GLUCOSE BLD-MCNC: 113 MG/DL (ref 70–110)
GLUCOSE BLDC GLUCOMTR-MCNC: 166 MG/DL (ref 70–130)
GLUCOSE UR STRIP-MCNC: ABNORMAL MG/DL
HBA1C MFR BLD: 5.6 % (ref 4.5–5.7)
HCO3 BLDA-SCNC: 23.9 MMOL/L (ref 22–26)
HCT VFR BLD AUTO: 35.8 % (ref 37–47)
HCT VFR BLD CALC: 36 % (ref 37–47)
HGB BLD-MCNC: 12 G/DL (ref 12–16)
HGB BLDA-MCNC: 12.1 G/DL (ref 12–16)
HGB UR QL STRIP.AUTO: ABNORMAL
HOROWITZ INDEX BLD+IHG-RTO: 21 %
HYALINE CASTS UR QL AUTO: ABNORMAL /LPF
IMM GRANULOCYTES # BLD: 0.02 10*3/MM3 (ref 0–0.03)
IMM GRANULOCYTES NFR BLD: 0.2 % (ref 0–0.5)
KETONES UR QL STRIP: NEGATIVE
LEUKOCYTE ESTERASE UR QL STRIP.AUTO: NEGATIVE
LYMPHOCYTES # BLD AUTO: 3.74 10*3/MM3 (ref 1–3)
LYMPHOCYTES NFR BLD AUTO: 42.6 % (ref 16–46)
MAGNESIUM SERPL-MCNC: 2 MG/DL (ref 1.7–2.6)
MCH RBC QN AUTO: 29.3 PG (ref 27–33)
MCHC RBC AUTO-ENTMCNC: 33.5 G/DL (ref 33–37)
MCV RBC AUTO: 87.3 FL (ref 80–94)
METHGB BLD QL: 0.3 % (ref 0–3)
MODALITY: ABNORMAL
MONOCYTES # BLD AUTO: 0.84 10*3/MM3 (ref 0.1–0.9)
MONOCYTES NFR BLD AUTO: 9.6 % (ref 0–12)
MYOGLOBIN SERPL-MCNC: 28 NG/ML (ref 0–109)
MYOGLOBIN SERPL-MCNC: 39 NG/ML (ref 0–109)
MYOGLOBIN SERPL-MCNC: 53 NG/ML (ref 0–109)
NEUTROPHILS # BLD AUTO: 3.02 10*3/MM3 (ref 1.4–6.5)
NEUTROPHILS NFR BLD AUTO: 34.4 % (ref 40–75)
NITRITE UR QL STRIP: NEGATIVE
NRBC BLD MANUAL-RTO: 0 /100 WBC (ref 0–0)
OSMOLALITY SERPL CALC.SUM OF ELEC: 276.2 MOSM/KG (ref 273–305)
OXYHGB MFR BLDV: 93.2 % (ref 85–100)
PCO2 BLDA: 35.1 MM HG (ref 35–45)
PH BLDA: 7.45 PH UNITS (ref 7.35–7.45)
PH UR STRIP.AUTO: 6 [PH] (ref 5–8)
PHOSPHATE SERPL-MCNC: 2.9 MG/DL (ref 2.7–4.5)
PLATELET # BLD AUTO: 317 10*3/MM3 (ref 130–400)
PMV BLD AUTO: 10 FL (ref 6–10)
PO2 BLDA: 71.9 MM HG (ref 80–100)
POTASSIUM BLD-SCNC: 3.8 MMOL/L (ref 3.5–5.3)
PROT SERPL-MCNC: 6.6 G/DL (ref 6–8)
PROT UR QL STRIP: NEGATIVE
RBC # BLD AUTO: 4.1 10*6/MM3 (ref 4.2–5.4)
RBC # UR: ABNORMAL /HPF
REF LAB TEST METHOD: ABNORMAL
SAO2 % BLDCOA: 94.7 % (ref 90–100)
SODIUM BLD-SCNC: 137 MMOL/L (ref 135–153)
SP GR UR STRIP: >1.03 (ref 1–1.03)
SQUAMOUS #/AREA URNS HPF: ABNORMAL /HPF
T4 FREE SERPL-MCNC: 1.64 NG/DL (ref 0.89–1.76)
TROPONIN I SERPL-MCNC: 0.02 NG/ML
TROPONIN I SERPL-MCNC: <0.006 NG/ML
TSH SERPL DL<=0.05 MIU/L-ACNC: 0.1 MIU/ML (ref 0.55–4.78)
UROBILINOGEN UR QL STRIP: ABNORMAL
WBC NRBC COR # BLD: 8.78 10*3/MM3 (ref 4.5–12.5)
WBC UR QL AUTO: ABNORMAL /HPF

## 2018-08-18 PROCEDURE — 82553 CREATINE MB FRACTION: CPT | Performed by: PHYSICIAN ASSISTANT

## 2018-08-18 PROCEDURE — 94799 UNLISTED PULMONARY SVC/PX: CPT

## 2018-08-18 PROCEDURE — 84443 ASSAY THYROID STIM HORMONE: CPT | Performed by: PHYSICIAN ASSISTANT

## 2018-08-18 PROCEDURE — 96372 THER/PROPH/DIAG INJ SC/IM: CPT

## 2018-08-18 PROCEDURE — 84484 ASSAY OF TROPONIN QUANT: CPT | Performed by: PHYSICIAN ASSISTANT

## 2018-08-18 PROCEDURE — 93010 ELECTROCARDIOGRAM REPORT: CPT | Performed by: INTERNAL MEDICINE

## 2018-08-18 PROCEDURE — 71045 X-RAY EXAM CHEST 1 VIEW: CPT | Performed by: RADIOLOGY

## 2018-08-18 PROCEDURE — 36415 COLL VENOUS BLD VENIPUNCTURE: CPT

## 2018-08-18 PROCEDURE — G0378 HOSPITAL OBSERVATION PER HR: HCPCS

## 2018-08-18 PROCEDURE — 82375 ASSAY CARBOXYHB QUANT: CPT | Performed by: PHYSICIAN ASSISTANT

## 2018-08-18 PROCEDURE — 85025 COMPLETE CBC W/AUTO DIFF WBC: CPT | Performed by: PHYSICIAN ASSISTANT

## 2018-08-18 PROCEDURE — 93005 ELECTROCARDIOGRAM TRACING: CPT | Performed by: EMERGENCY MEDICINE

## 2018-08-18 PROCEDURE — 82805 BLOOD GASES W/O2 SATURATION: CPT | Performed by: PHYSICIAN ASSISTANT

## 2018-08-18 PROCEDURE — 99285 EMERGENCY DEPT VISIT HI MDM: CPT

## 2018-08-18 PROCEDURE — 25010000002 METHYLPREDNISOLONE PER 40 MG: Performed by: PHYSICIAN ASSISTANT

## 2018-08-18 PROCEDURE — 83880 ASSAY OF NATRIURETIC PEPTIDE: CPT | Performed by: PHYSICIAN ASSISTANT

## 2018-08-18 PROCEDURE — 93005 ELECTROCARDIOGRAM TRACING: CPT | Performed by: INTERNAL MEDICINE

## 2018-08-18 PROCEDURE — 82550 ASSAY OF CK (CPK): CPT | Performed by: PHYSICIAN ASSISTANT

## 2018-08-18 PROCEDURE — 25010000002 HEPARIN (PORCINE) PER 1000 UNITS: Performed by: PHYSICIAN ASSISTANT

## 2018-08-18 PROCEDURE — 82962 GLUCOSE BLOOD TEST: CPT

## 2018-08-18 PROCEDURE — 84439 ASSAY OF FREE THYROXINE: CPT | Performed by: PHYSICIAN ASSISTANT

## 2018-08-18 PROCEDURE — 36600 WITHDRAWAL OF ARTERIAL BLOOD: CPT | Performed by: PHYSICIAN ASSISTANT

## 2018-08-18 PROCEDURE — 99220 PR INITIAL OBSERVATION CARE/DAY 70 MINUTES: CPT | Performed by: HOSPITALIST

## 2018-08-18 PROCEDURE — 83735 ASSAY OF MAGNESIUM: CPT | Performed by: PHYSICIAN ASSISTANT

## 2018-08-18 PROCEDURE — 83036 HEMOGLOBIN GLYCOSYLATED A1C: CPT | Performed by: PHYSICIAN ASSISTANT

## 2018-08-18 PROCEDURE — 80053 COMPREHEN METABOLIC PANEL: CPT | Performed by: PHYSICIAN ASSISTANT

## 2018-08-18 PROCEDURE — 96374 THER/PROPH/DIAG INJ IV PUSH: CPT

## 2018-08-18 PROCEDURE — 94640 AIRWAY INHALATION TREATMENT: CPT

## 2018-08-18 PROCEDURE — 0 IOPAMIDOL PER 1 ML: Performed by: EMERGENCY MEDICINE

## 2018-08-18 PROCEDURE — 83050 HGB METHEMOGLOBIN QUAN: CPT | Performed by: PHYSICIAN ASSISTANT

## 2018-08-18 PROCEDURE — 71275 CT ANGIOGRAPHY CHEST: CPT | Performed by: RADIOLOGY

## 2018-08-18 PROCEDURE — 84100 ASSAY OF PHOSPHORUS: CPT | Performed by: PHYSICIAN ASSISTANT

## 2018-08-18 PROCEDURE — 71275 CT ANGIOGRAPHY CHEST: CPT

## 2018-08-18 PROCEDURE — 81001 URINALYSIS AUTO W/SCOPE: CPT | Performed by: PHYSICIAN ASSISTANT

## 2018-08-18 PROCEDURE — 71045 X-RAY EXAM CHEST 1 VIEW: CPT

## 2018-08-18 PROCEDURE — 83874 ASSAY OF MYOGLOBIN: CPT | Performed by: PHYSICIAN ASSISTANT

## 2018-08-18 PROCEDURE — 85379 FIBRIN DEGRADATION QUANT: CPT | Performed by: PHYSICIAN ASSISTANT

## 2018-08-18 RX ORDER — ASPIRIN 81 MG/1
81 TABLET, CHEWABLE ORAL DAILY
Status: DISCONTINUED | OUTPATIENT
Start: 2018-08-19 | End: 2018-08-19 | Stop reason: HOSPADM

## 2018-08-18 RX ORDER — IPRATROPIUM BROMIDE AND ALBUTEROL SULFATE 2.5; .5 MG/3ML; MG/3ML
3 SOLUTION RESPIRATORY (INHALATION) ONCE
Status: COMPLETED | OUTPATIENT
Start: 2018-08-18 | End: 2018-08-18

## 2018-08-18 RX ORDER — ACETAMINOPHEN 325 MG/1
650 TABLET ORAL EVERY 6 HOURS PRN
Status: DISCONTINUED | OUTPATIENT
Start: 2018-08-18 | End: 2018-08-19 | Stop reason: HOSPADM

## 2018-08-18 RX ORDER — NITROGLYCERIN 0.4 MG/1
0.4 TABLET SUBLINGUAL
Status: DISCONTINUED | OUTPATIENT
Start: 2018-08-18 | End: 2018-08-19 | Stop reason: HOSPADM

## 2018-08-18 RX ORDER — SODIUM CHLORIDE 0.9 % (FLUSH) 0.9 %
1-10 SYRINGE (ML) INJECTION AS NEEDED
Status: DISCONTINUED | OUTPATIENT
Start: 2018-08-18 | End: 2018-08-19 | Stop reason: HOSPADM

## 2018-08-18 RX ORDER — PANTOPRAZOLE SODIUM 40 MG/10ML
40 INJECTION, POWDER, LYOPHILIZED, FOR SOLUTION INTRAVENOUS
Status: DISCONTINUED | OUTPATIENT
Start: 2018-08-19 | End: 2018-08-19

## 2018-08-18 RX ORDER — ACETAMINOPHEN 325 MG/1
TABLET ORAL
Status: DISPENSED
Start: 2018-08-18 | End: 2018-08-19

## 2018-08-18 RX ORDER — ATORVASTATIN CALCIUM 40 MG/1
40 TABLET, FILM COATED ORAL NIGHTLY
Status: CANCELLED | OUTPATIENT
Start: 2018-08-18

## 2018-08-18 RX ORDER — HYDROXYZINE HYDROCHLORIDE 10 MG/1
10 TABLET, FILM COATED ORAL ONCE
Status: COMPLETED | OUTPATIENT
Start: 2018-08-18 | End: 2018-08-18

## 2018-08-18 RX ORDER — ATORVASTATIN CALCIUM 40 MG/1
40 TABLET, FILM COATED ORAL NIGHTLY
Status: DISCONTINUED | OUTPATIENT
Start: 2018-08-18 | End: 2018-08-19 | Stop reason: HOSPADM

## 2018-08-18 RX ORDER — LEVOTHYROXINE SODIUM 137 UG/1
137 TABLET ORAL DAILY
Status: ON HOLD | COMMUNITY
End: 2019-01-12

## 2018-08-18 RX ORDER — METHYLPREDNISOLONE SODIUM SUCCINATE 40 MG/ML
80 INJECTION, POWDER, LYOPHILIZED, FOR SOLUTION INTRAMUSCULAR; INTRAVENOUS ONCE
Status: COMPLETED | OUTPATIENT
Start: 2018-08-18 | End: 2018-08-18

## 2018-08-18 RX ORDER — SODIUM CHLORIDE 0.9 % (FLUSH) 0.9 %
10 SYRINGE (ML) INJECTION AS NEEDED
Status: DISCONTINUED | OUTPATIENT
Start: 2018-08-18 | End: 2018-08-19 | Stop reason: HOSPADM

## 2018-08-18 RX ORDER — ASPIRIN 81 MG/1
324 TABLET, CHEWABLE ORAL ONCE
Status: DISCONTINUED | OUTPATIENT
Start: 2018-08-18 | End: 2018-08-18

## 2018-08-18 RX ORDER — PANTOPRAZOLE SODIUM 40 MG/1
40 TABLET, DELAYED RELEASE ORAL
Status: DISCONTINUED | OUTPATIENT
Start: 2018-08-18 | End: 2018-08-19 | Stop reason: SDUPTHER

## 2018-08-18 RX ORDER — METOPROLOL SUCCINATE 25 MG/1
12.5 TABLET, EXTENDED RELEASE ORAL
Status: DISCONTINUED | OUTPATIENT
Start: 2018-08-18 | End: 2018-08-18

## 2018-08-18 RX ORDER — HEPARIN SODIUM 5000 [USP'U]/ML
5000 INJECTION, SOLUTION INTRAVENOUS; SUBCUTANEOUS EVERY 12 HOURS SCHEDULED
Status: DISCONTINUED | OUTPATIENT
Start: 2018-08-18 | End: 2018-08-19 | Stop reason: HOSPADM

## 2018-08-18 RX ORDER — ASPIRIN 81 MG/1
81 TABLET ORAL DAILY
Status: CANCELLED | OUTPATIENT
Start: 2018-08-18

## 2018-08-18 RX ADMIN — METOPROLOL TARTRATE 25 MG: 25 TABLET, FILM COATED ORAL at 19:34

## 2018-08-18 RX ADMIN — ATORVASTATIN CALCIUM 40 MG: 40 TABLET, FILM COATED ORAL at 19:34

## 2018-08-18 RX ADMIN — IPRATROPIUM BROMIDE AND ALBUTEROL SULFATE 3 ML: .5; 3 SOLUTION RESPIRATORY (INHALATION) at 07:28

## 2018-08-18 RX ADMIN — IOPAMIDOL 100 ML: 755 INJECTION, SOLUTION INTRAVENOUS at 09:00

## 2018-08-18 RX ADMIN — METOPROLOL SUCCINATE 12.5 MG: 25 TABLET, FILM COATED, EXTENDED RELEASE ORAL at 14:09

## 2018-08-18 RX ADMIN — HYDROXYZINE HYDROCHLORIDE 10 MG: 10 TABLET ORAL at 23:21

## 2018-08-18 RX ADMIN — IPRATROPIUM BROMIDE AND ALBUTEROL SULFATE 3 ML: .5; 3 SOLUTION RESPIRATORY (INHALATION) at 06:30

## 2018-08-18 RX ADMIN — SODIUM CHLORIDE 500 ML: 9 INJECTION, SOLUTION INTRAVENOUS at 06:39

## 2018-08-18 RX ADMIN — HEPARIN SODIUM 5000 UNITS: 5000 INJECTION, SOLUTION INTRAVENOUS; SUBCUTANEOUS at 12:35

## 2018-08-18 RX ADMIN — METHYLPREDNISOLONE SODIUM SUCCINATE 80 MG: 40 INJECTION, POWDER, FOR SOLUTION INTRAMUSCULAR; INTRAVENOUS at 06:42

## 2018-08-18 RX ADMIN — NITROGLYCERIN 0.4 MG: 0.4 TABLET SUBLINGUAL at 18:25

## 2018-08-18 RX ADMIN — HEPARIN SODIUM 5000 UNITS: 5000 INJECTION, SOLUTION INTRAVENOUS; SUBCUTANEOUS at 19:34

## 2018-08-18 RX ADMIN — NITROGLYCERIN 0.4 MG: 0.4 TABLET SUBLINGUAL at 18:09

## 2018-08-18 RX ADMIN — PANTOPRAZOLE SODIUM 40 MG: 40 TABLET, DELAYED RELEASE ORAL at 14:09

## 2018-08-18 RX ADMIN — NITROGLYCERIN 0.4 MG: 0.4 TABLET SUBLINGUAL at 18:20

## 2018-08-18 RX ADMIN — NITROGLYCERIN 0.5 INCH: 20 OINTMENT TOPICAL at 19:34

## 2018-08-18 RX ADMIN — ACETAMINOPHEN 650 MG: 325 TABLET ORAL at 18:08

## 2018-08-18 NOTE — ED NOTES
Called report to Tonia WOOTEN on 3S at this time     Laura Barboza RN  08/18/18 0949       Laura Barboza RN  08/18/18 0952

## 2018-08-18 NOTE — H&P
"     [facility] HOSPITALIST HISTORY AND PHYSICAL    Patient Identification:  Name:  Jackie Erwin  Age:  78 y.o.  Sex:  female  :  1940  MRN:  [id number]   Visit Number:  [id number]  Primary Care Physician:  Cristiana Sánchez APRN     Subjective     Chief complaint:   Chief Complaint   Patient presents with   • Chest Pain   • Shortness of Breath     History of presenting illness:   Patient is a 78 y.o. female with past medical history significant for essential hypertension, hyperlipidemia, carotid artery disease s/p right carotid endarterectomy , non-insulin dependent type II diabetes mellitus, hypothyroidism, and obesity that presented to the [facility] emergency department for evaluation of chest pain and shortness of breath.  As her patient she has chronic occasional episodes of cough nonproductive and sometimes wakes up with coughing.  This time she did not have any cough and woke up which short of breath, denies palpitation no nausea or vomiting.  Chest discomfort was described as gastric nonradiating, not related to activity non pleuritic.  Patient reports chronic dyspepsia and \"acid reflux\".     Upon arrival to the ED, vitals were: temperature 98.2°F, heart rate of 71, blood pressure 155/83 and breast rate of 23, pulse ox is 98% room air.  Repeat respiratory rate was 18/m.  Initial troponin was negative CPK was normal, CT scan of the chest PE protocol was negative for PE she has moderate size hiatal hernia.  Because of her age, and risk factors,  Patient has been placed in observation status on the telemetry floor for further evaluation and treatment.     Patient reports she still active, ambulates and perform her daily chores without any shortness of breath chest pain, no leg swelling no lightheadedness no diaphoresis.  Patient does report chronic dyspepsia and reflux " symptoms.    ---------------------------------------------------------------------------------------------------------------------   Review of Systems   Constitutional: Negative for activity change, appetite change, diaphoresis, fatigue and unexpected weight change.   HENT: Negative for congestion, hearing loss, sneezing, tinnitus and voice change.    Eyes: Negative for pain and discharge.   Respiratory: Positive for cough, choking and shortness of breath. Negative for apnea, chest tightness, wheezing and stridor.    Cardiovascular: Negative for chest pain, palpitations and leg swelling.   Gastrointestinal: Negative for abdominal distention, blood in stool, constipation, rectal pain and vomiting.   Endocrine: Negative for cold intolerance, heat intolerance, polydipsia, polyphagia and polyuria.   Genitourinary: Negative for difficulty urinating, pelvic pain, urgency, vaginal bleeding and vaginal discharge.   Musculoskeletal: Negative for arthralgias, back pain, gait problem, neck pain and neck stiffness.   Allergic/Immunologic: Negative.    Neurological: Negative for dizziness, seizures, syncope and light-headedness.   Hematological: Negative.    Psychiatric/Behavioral: Negative.         ---------------------------------------------------------------------------------------------------------------------   Past Medical History:   Diagnosis Date   • Carotid artery disease (CMS/HCC)     Right sided, s/p Right carotid endarterectomy 2011   • Diabetes mellitus (CMS/HCC)    • Diabetic neuropathy (CMS/HCC)    • Disease of thyroid gland    • Hyperlipidemia    • Hypertension    • TIA (transient ischemic attack) 2011     Past Surgical History:   Procedure Laterality Date   • APPENDECTOMY      6 yrs old   • CAROTID ENDARTERECTOMY     • CAROTID ENDARTERECTOMY Right 2011   • HYSTERECTOMY     • INGUINAL HERNIA REPAIR  1970     Family History   Problem Relation Age of Onset   • Thrombosis Father      Social History     Social  History   • Marital status:      Social History Main Topics   • Smoking status: Former Smoker     Types: Cigarettes     Quit date: 1975   • Smokeless tobacco: Never Used   • Alcohol use No   • Drug use: No   • Sexual activity: Defer     Other Topics Concern   • Not on file     ---------------------------------------------------------------------------------------------------------------------   Allergies:  Patient has no known allergies.  ---------------------------------------------------------------------------------------------------------------------   Prior to Admission Medications     Prescriptions Last Dose Informant Patient Reported? Taking?    levothyroxine (SYNTHROID, LEVOTHROID) 137 MCG tablet 8/17/2018 Pharmacy Yes Yes    Take 137 mcg by mouth Daily.    lisinopril-hydrochlorothiazide (PRINZIDE,ZESTORETIC) 20-12.5 MG per tablet 8/17/2018 Self Yes Yes    Take 1 tablet by mouth Daily.    loratadine (CLARITIN) 10 MG tablet Past Week Self Yes Yes    Take 10 mg by mouth Daily.    aspirin 81 MG EC tablet 8/9/2018 Self Yes No    Take 81 mg by mouth every night at bedtime.    atorvastatin (LIPITOR) 40 MG tablet 8/16/2018 Self Yes No    Take 40 mg by mouth Every Night.        ---------------------------------------------------------------------------------------------------------------------    Objective     Hospital Scheduled Meds:    [START ON 8/19/2018] aspirin 81 mg Oral Daily   heparin (porcine) 5,000 Units Subcutaneous Q12H   nitroglycerin 1 inch Topical Once   [START ON 8/19/2018] pantoprazole 40 mg Intravenous Q AM        ---------------------------------------------------------------------------------------------------------------------   Vital Signs:  Temp:  [98 °F (36.7 °C)-98.2 °F (36.8 °C)] 98.2 °F (36.8 °C)  Heart Rate:  [71-95] 95  Resp:  [18-23] 18  BP: (152-168)/() 165/86  Mean Arterial Pressure (Non-Invasive) for the past 24 hrs (Last 3 readings):   Noninvasive MAP (mmHg)   08/18/18  1023 121   08/18/18 0902 103   08/18/18 0833 92     SpO2 Percentage    08/18/18 0833 08/18/18 0902 08/18/18 1023   SpO2: 94% 99% 98%     SpO2:  [94 %-99 %] 98 %  on  Flow (L/min):  [2] 2;   Device (Oxygen Therapy): nasal cannula    Body mass index is 33.68 kg/m².  Wt Readings from Last 3 Encounters:   08/18/18 89 kg (196 lb 3 oz)   06/14/18 88.9 kg (196 lb)   04/11/18 90.7 kg (200 lb)     ---------------------------------------------------------------------------------------------------------------------   Physical Exam:  Constitutional:  Well-developed and well-nourished.  No respiratory distress.   Awake and alert, denies any more chest pain.  HENT:  Head: Normocephalic and atraumatic.  Mouth:  Moist mucous membranes.    Eyes:  Conjunctivae and EOM are normal.  Pupils are equal, round, and reactive to light.  No scleral icterus.  Neck:  Neck supple.  No JVD present.  She has a right carotid endarterectomy scar, no bruit no hepatojugular reflux   Cardiovascular:  Normal rate, regular rhythm and normal heart sounds with no murmur.  Pulmonary/Chest:  No respiratory distress, no wheezes, no crackles, with normal breath sounds and good air movement.  Abdominal:  Soft.  Bowel sounds are normal.  No distension and no tenderness.  Obese no guarding no bruit no rigidity   Musculoskeletal:  No edema, no tenderness, and no deformity.  No red or swollen joints anywhere.    Neurological:  Alert and oriented to person, place, and time.  No cranial nerve deficit.  No tongue deviation.  No facial droop.  No slurred speech.   Skin:  Skin is warm and dry.  No rash noted.  No pallor.   Psychiatric:  Normal mood and affect.  Behavior is normal.  Judgment and thought content normal.   Peripheral vascular:  No edema and strong pulses on all 4 extremities. Cap refill < 3 seconds.    ---------------------------------------------------------------------------------------------------------------------  EKG:        Telemetry:    Sinus  rhythm rate of 190/m    I have personally reviewed the EKG/Telemetry strip  ---------------------------------------------------------------------------------------------------------------------     Results from last 7 days  Lab Units 08/18/18  1052 08/18/18  0928 08/18/18  0645   CK TOTAL U/L 25  --  25   CKMB ng/mL <0.18  --  <0.18   TROPONIN I ng/mL <0.006 <0.006 <0.006   MYOGLOBIN ng/mL 39.0  --   --            Results from last 7 days  Lab Units 08/18/18  0729   PH, ARTERIAL pH units 7.451*   PO2 ART mm Hg 71.9*   PCO2, ARTERIAL mm Hg 35.1   HCO3 ART mmol/L 23.9       Results from last 7 days  Lab Units 08/18/18  0645   WBC 10*3/mm3 8.78   HEMOGLOBIN g/dL 12.0   HEMATOCRIT % 35.8*   MCV fL 87.3   MCHC g/dL 33.5   PLATELETS 10*3/mm3 317       Results from last 7 days  Lab Units 08/18/18  0928 08/18/18  0645   SODIUM mmol/L  --  137   POTASSIUM mmol/L  --  3.8   MAGNESIUM mg/dL 2.0  --    CHLORIDE mmol/L  --  103   CO2 mmol/L  --  23.8*   BUN mg/dL  --  17   CREATININE mg/dL  --  0.78   EGFR IF NONAFRICN AM mL/min/1.73  --  71   CALCIUM mg/dL  --  9.9   GLUCOSE mg/dL  --  113*   ALBUMIN g/dL  --  4.10   BILIRUBIN mg/dL  --  0.4   ALK PHOS U/L  --  84   AST (SGOT) U/L  --  18   ALT (SGPT) U/L  --  14   Estimated Creatinine Clearance: 60.6 mL/min (by C-G formula based on SCr of 0.78 mg/dL).    Glucose   Date/Time Value Ref Range Status   08/18/2018 1137 166 (H) 70 - 130 mg/dL Final     Lab Results   Component Value Date    HGBA1C 5.3 12/17/2015     Lab Results   Component Value Date    TSH 0.096 (L) 08/18/2018    FREET4 0.87 (L) 12/17/2015     I have personally reviewed the above laboratory results.   ---------------------------------------------------------------------------------------------------------------------  Imaging Results (last 7 days)     Procedure Component Value Units Date/Time    XR Chest 1 View [[id number]] Collected:  08/18/18 0914     Updated:  08/18/18 0916    Narrative:       FINDINGS:   The  lungs remain aerated.  Heart and mediastinum contours are unremarkable.  No pleural effusion.  No pneumothorax.   Bony and soft tissue structures are unremarkable.    Impression:       No radiographic evidence of acute cardiac or pulmonary  disease.     This report was finalized on 8/18/2018 9:14 AM by Dr. Franki Shaw MD.    CT Chest Pulmonary Embolism With Contrast [[id number]] Collected:  08/18/18 0913     Updated:  08/18/18 0916    Narrative:       FINDINGS:    Minimal subpleural fibrotic change. Pulmonary hypertension.  Coronary artery calcifications. Moderate-sized hiatal hernia. There is  no pulmonary artery filling defect to suggest pulmonary embolism. There  is no thoracic adenopathy. No pleural or pericardial effusion.  Incidentally imaged upper abdomen is unremarkable. Bone windows show no  acute osseous abnormality.    Impression:       1. No PE.   2. Minimal subpleural fibrotic change. Pulmonary hypertension. Coronary  artery calcifications. Moderate-sized hiatal hernia.     This report was finalized on 8/18/2018 9:14 AM by Dr. Franki Shaw MD.      I have personally reviewed the above radiology results.     Transthoracic Echocardiogram 6/22/2017    ---------------------------------------------------------------------------------------------------------------------    Assessment & Plan      -Chest pain, rule out MI  - Moderate hiatal hernia with symptoms suggestive of gastro- esophageal reflux, will be tried on PPI.  -Essential hypertension   -Hyperlipidemia   -History of carotid artery disease s/p right carotid endarterectomy 2011  -Mild mitral valve regurgitation   -Mild tricuspid valve regurgitation   -Non-insulin dependent type II diabetes mellitus   -Acquired Hypothyroidism   -Elevated D-Dimer, CT chest negative for PE  -Moderate sized hiatal hernia   -History of TIA  -Diabetic peripheral neuropathy   -Obesity, BMI 33.68 kg/m²  -Former smoker   - Mild pulmonary hypertension on 2-D echo    Patient  has been placed in observation status on the telemetry floor for further evaluation and treatment.     Continue to closely monitor electrolytes and replace per protocol as necessary. Will review patient's home medications once reconciled per pharmacy and resume as appropriate.  Monitor telemetry, and blood pressure, adjust blood pressure medication as needed.  Will repeat labs in AM and continue to closely monitor the patient on telemetry.  Cardiology service will be requested.      DVT Prophylaxis: SQ Heparin   Activity: Bed rest     The patient is considered to be a high risk patient due to: Carotid artery disease, essential hypertension, history of CVA, diabetes, and strong family history of myocardial infarction including father who had MI at the age of 50+.    OBSERVATION status, however if further evaluation or treatment plans warrant, status may change.  Based upon current information, I predict patient's care encounter to be less than or equal to 2 midnights.    Code Status: DNR/DNI      I have discussed the patient's assessment and plan with the patient, daughter who is also the POA and agreed with plan of care.     MATTHEW Morris  Hospitalist Service  08/18/18  12:17 PM  ---------------------------------------------------------------------------------------------------------------------   Physician Attestation: I have seen and examined the patient. I agree with the assessment and plan of Nithya Perez PA-C. I have amended the above note to reflect my findings in history, physical examination, decision making and management.  plan.

## 2018-08-18 NOTE — ED PROVIDER NOTES
Subjective   Patient has HLD and HTN but no history of CAD, MI, cardiac cath with stents or CABG. She has no history of COPD and is not on oxygen and doesn't use an inhaler or neb treatments. She has diabetes but not on insulin and is well controlled.         Cough   Cough characteristics:  Non-productive  Severity:  Moderate  Onset quality:  Gradual  Duration:  3 weeks  Timing:  Intermittent  Progression:  Worsening  Chronicity:  New  Smoker: no (Stopped smoking in 1972)    Context: upper respiratory infection    Context: not animal exposure, not exposure to allergens, not fumes, not occupational exposure, not sick contacts, not smoke exposure, not weather changes and not with activity    Relieved by:  None tried  Worsened by:  Nothing  Ineffective treatments:  None tried  Associated symptoms: chest pain, sinus congestion and wheezing    Associated symptoms: no chills, no diaphoresis, no ear fullness, no ear pain, no eye discharge, no fever, no headaches, no myalgias, no rash, no rhinorrhea, no shortness of breath, no sore throat and no weight loss    Risk factors: no chemical exposure, no recent infection and no recent travel        Review of Systems   Constitutional: Negative.  Negative for chills, diaphoresis, fever and weight loss.   HENT: Negative.  Negative for ear pain, rhinorrhea and sore throat.    Eyes: Negative for discharge.   Respiratory: Positive for cough and wheezing. Negative for apnea, choking, chest tightness, shortness of breath and stridor.    Cardiovascular: Positive for chest pain. Negative for palpitations and leg swelling.   Gastrointestinal: Negative.  Negative for abdominal pain.   Endocrine: Negative.    Genitourinary: Negative.  Negative for dysuria.   Musculoskeletal: Negative for myalgias.   Skin: Negative.  Negative for rash.   Neurological: Negative.  Negative for headaches.   Psychiatric/Behavioral: Negative.    All other systems reviewed and are negative.      Past Medical  History:   Diagnosis Date   • Diabetes mellitus (CMS/HCC)    • Disease of thyroid gland    • Hyperlipidemia    • Hypertension        No Known Allergies    Past Surgical History:   Procedure Laterality Date   • APPENDECTOMY      6 yrs old   • CAROTID ENDARTERECTOMY     • HYSTERECTOMY     • INGUINAL HERNIA REPAIR  1970       Family History   Problem Relation Age of Onset   • Thrombosis Father        Social History     Social History   • Marital status:      Social History Main Topics   • Smoking status: Former Smoker     Types: Cigarettes   • Smokeless tobacco: Never Used   • Alcohol use No   • Drug use: No   • Sexual activity: Defer     Other Topics Concern   • Not on file           Objective   Physical Exam   Constitutional: She is oriented to person, place, and time. She appears well-developed and well-nourished. No distress.   HENT:   Head: Normocephalic and atraumatic.   Right Ear: External ear normal.   Left Ear: External ear normal.   Nose: Nose normal.   Eyes: Pupils are equal, round, and reactive to light. Conjunctivae and EOM are normal.   Neck: Normal range of motion. Neck supple. No JVD present. No tracheal deviation present.   Cardiovascular: Normal rate, regular rhythm and normal heart sounds.  Exam reveals no gallop and no friction rub.    No murmur heard.  Pulmonary/Chest: Effort normal. No respiratory distress. She has wheezes. She has no rales. She exhibits no tenderness.   Wheezing diffusely in bilateral lung lobes    Abdominal: Soft. Bowel sounds are normal. She exhibits no distension and no mass. There is no tenderness. There is no rebound and no guarding. No hernia.   Musculoskeletal: Normal range of motion. She exhibits no edema or deformity.   Neurological: She is alert and oriented to person, place, and time. No cranial nerve deficit.   Skin: Skin is warm and dry. No rash noted. She is not diaphoretic. No erythema. No pallor.   Psychiatric: She has a normal mood and affect. Her behavior  is normal. Thought content normal.   Nursing note and vitals reviewed.      Procedures           ED Course  ED Course as of Aug 18 1004   Sat Aug 18, 2018   0612 Normal sinus rhythm; no acute changes per Dr. Rollins.  ECG 12 Lead [MM]   0711 Patient had aspirin in route via EMS  [MM]   0729 No definitive acute findings; suggests CT per Dr. Rollins.  XR Chest 1 View [MM]   0849 78-year-old white female presents secondary to chest pain with shortness of breath.  She states that she had pain in the center of her chest.   She states that she awoke with symptoms. She is unable to describe this pain.  She states it lasted several minutes.  This has resolved at this point.  No fever.  She states that she's had a nonproductive cough for many weeks with shortness of breath.  No increased lower family swelling.  No calf pain.  We're awaiting a CT of the chest at this time.  Physical exam is normal other than a few rhonchi in her left base.  [JI]   0935 Paged hospitalist.  [JI]      ED Course User Index  [JI] Saurav Singh PA  [MM] Radha Voss PA                  MDM  Number of Diagnoses or Management Options  Chest pain, unspecified type: established and worsening     Amount and/or Complexity of Data Reviewed  Clinical lab tests: reviewed and ordered  Tests in the radiology section of CPT®: reviewed and ordered  Tests in the medicine section of CPT®: reviewed and ordered  Discuss the patient with other providers: yes    Risk of Complications, Morbidity, and/or Mortality  Presenting problems: moderate          Final diagnoses:   Chest pain, unspecified type            Saurav Singh PA  08/18/18 1004

## 2018-08-18 NOTE — ED NOTES
Informed consent for IV contrast has been signed at this time     Laura Barboza, RN  08/18/18 0901

## 2018-08-18 NOTE — CONSULTS
Referring Provider: -Hospitalist on-call    Primary care provider-Cristiana SANCHEZ    Reason for Consultation:     Chest pain and dyspnea.  Coronary calcification on the CT of the chest    Chief complaint     Dyspnea and chest pain      History of present illness:      78-year-old woman with no significant past cardiac illness has been admitted with chief complaint of dyspnea and chest pain.    Dyspnea- started suddenly this morning and was progressive.  No history of PND or orthopnea.  At present her symptom is stable.  Not associated with leg edema.  CT of the chest showed no evidence of PE.  No evidence of CHF.    Chest pain-  started with onset of dyspnea.  Anterior, dull aching, lasted up to 2 hours, with variable intent, with no definite aggravating or relieving factors, not related to exertion or breathing, with no radiation and not associated with diaphoresis.  Cardiac markers ×2 negative.  ECG is normal and showed no evidence of ischemia.  CT of the chest showed calcification of the coronaries.    Palpitation-she has a history of intermittent palpitations for a long time and not associated with dizziness or syncope.  She has history of anxiety and thyroid disorder.  No history of excessive caffeine intake.    No history of known heart achiness suggest CAD, CHF or cardiac arrhythmia.  She has a history of right-sided carotid disease and status post CEA 2011 when she had TIA.  No residual deficits.    Cardiac risk factors-hypertension , DM type II taking no medications, hyperlipidemia and her age    She has history of hypothyroidism and hiatal hernia detected on CT of the chest.      Review of Systems     Constitutional-none  ENT-none  Cardiovascular-as above  Respiratory-dyspnea  GI-hiatal hernia  Musculoskeletal-backache and arthritis  Endocrine-lipid disorder and thyroid disorder  Psychiatric-anxiety  Other system involvement-negative    History  Past Medical History:   Diagnosis Date   • Carotid  "artery disease (CMS/HCC)     Right sided, s/p Right carotid endarterectomy 2011   • Diabetes mellitus (CMS/HCC)    • Diabetic neuropathy (CMS/HCC)    • Disease of thyroid gland    • Hyperlipidemia    • Hypertension    • TIA (transient ischemic attack) 2011   , Past Surgical History:   Procedure Laterality Date   • APPENDECTOMY      6 yrs old   • CAROTID ENDARTERECTOMY     • CAROTID ENDARTERECTOMY Right 2011   • HYSTERECTOMY     • INGUINAL HERNIA REPAIR  1970   , Family History   Problem Relation Age of Onset   • Thrombosis Father    , Social History   Substance Use Topics   • Smoking status: Former Smoker     Types: Cigarettes     Quit date: 1975   • Smokeless tobacco: Never Used   • Alcohol use No   ,     Medication Review:      [START ON 8/19/2018] aspirin 81 mg Oral Daily   atorvastatin 40 mg Oral Nightly   heparin (porcine) 5,000 Units Subcutaneous Q12H   metoprolol succinate XL 12.5 mg Oral Q24H   nitroglycerin 1 inch Topical Once   [START ON 8/19/2018] pantoprazole 40 mg Intravenous Q AM        Allergies:  Patient has no known allergies.     Social history-no history of smoking I'll call her substance abuse    Family history-noncontributory      Objective     Vital Sign Min/Max for last 24 hours  Temp  Min: 98 °F (36.7 °C)  Max: 98.2 °F (36.8 °C)   BP  Min: 152/74  Max: 168/75   Pulse  Min: 71  Max: 95   Resp  Min: 18  Max: 23   SpO2  Min: 94 %  Max: 99 %   No Data Recorded   Weight  Min: 83.9 kg (185 lb)  Max: 89 kg (196 lb 3 oz)     Flowsheet Rows      First Filed Value   Admission Height  165.1 cm (65\") Documented at 08/18/2018 0602   Admission Weight  83.9 kg (185 lb) Documented at 08/18/2018 0602             Physical Exam:     General Appearance:    Alert, cooperative, in no acute distress   Head:    Normocephalic, without obvious abnormality, atraumatic   Eyes:            Lids and lashes normal, conjunctivae and sclerae normal, no   icterus, no pallor, corneas clear, PERRLA   Ears:    Ears appear intact " with no abnormalities noted   Throat:   No oral lesions, no thrush, oral mucosa moist   Neck:   No adenopathy, supple, trachea midline, no thyromegaly, no   carotid bruit, no JVD.  No carotid bruit    Back:     No kyphosis present, no scoliosis present, no skin lesions,      erythema or scars, no tenderness to percussion or                   palpation,   range of motion normal   Lungs:     Clear to auscultation,respirations regular, even and                  unlabored    Heart:    Regular rhythm and normal rate, normal S1 and S2, no            murmur, no gallop, no rub, no click   Chest Wall:    No abnormalities observed   Abdomen:     Normal bowel sounds, no masses, no organomegaly, soft        non-tender, non-distended, no guarding, no rebound                tenderness   Rectal:     Deferred   Extremities:  Pedal edema    Pulses:   Pulses palpable and equal bilaterally   Skin:   No bleeding, bruising or rash       Neurologic:   Cranial nerves 2 - 12 grossly intact, sensation intact, DTR       present and equal bilaterally       Telemetry  Normal sinus rhythm    ECG  Normal             Labs    Results from last 7 days  Lab Units 08/18/18  0645   WBC 10*3/mm3 8.78   HEMOGLOBIN g/dL 12.0   HEMATOCRIT % 35.8*   PLATELETS 10*3/mm3 317       Results from last 7 days  Lab Units 08/18/18  0645   SODIUM mmol/L 137   POTASSIUM mmol/L 3.8   CHLORIDE mmol/L 103   CO2 mmol/L 23.8*   BUN mg/dL 17   CREATININE mg/dL 0.78   CALCIUM mg/dL 9.9   GLUCOSE mg/dL 113*       Results from last 7 days  Lab Units 08/18/18  0645   BILIRUBIN mg/dL 0.4   ALK PHOS U/L 84   AST (SGOT) U/L 18   ALT (SGPT) U/L 14       Results from last 7 days  Lab Units 08/18/18  0928   MAGNESIUM mg/dL 2.0               Results from last 7 days  Lab Units 08/18/18  1052 08/18/18  0928 08/18/18  0645   CK TOTAL U/L 25  --  25   TROPONIN I ng/mL <0.006 <0.006 <0.006   MYOGLOBIN ng/mL 39.0  --   --        Results from last 7 days  Lab Units 08/18/18  0729   PH,  ARTERIAL pH units 7.451*   PO2 ART mm Hg 71.9*   PCO2, ARTERIAL mm Hg 35.1   HCO3 ART mmol/L 23.9         Imaging  Imaging Results (last 24 hours)     Procedure Component Value Units Date/Time    XR Chest 1 View [[id number]] Collected:  08/18/18 0914     Updated:  08/18/18 0916    Narrative:       EXAMINATION: XR CHEST 1 VW-      CLINICAL INDICATION:     Cough     TECHNIQUE:  XR CHEST 1 VW-      COMPARISON: NONE      FINDINGS:   The lungs remain aerated.  Heart and mediastinum contours are unremarkable.  No pleural effusion.  No pneumothorax.   Bony and soft tissue structures are unremarkable.       Impression:       No radiographic evidence of acute cardiac or pulmonary  disease.     This report was finalized on 8/18/2018 9:14 AM by Dr. Franki Shaw MD.       CT Chest Pulmonary Embolism With Contrast [[id number]] Collected:  08/18/18 0913     Updated:  08/18/18 0916    Narrative:       EXAMINATION: CT CHEST PULMONARY EMBOLISM W CONTRAST-      Technique: Multiple CT axial images were obtained through the level of  pulmonary arteries, following IV contrast administration per CT PE  protocol.  Volume Rendered 3D or MIP images performed.     Radiation dose reduction techniques were utilized per ALARA protocol.  Automated exposure control was initiated through either or [employer] or  DoseRight software packages by  protocol.       216.09 mGy.cm        CLINICAL INDICATION:    SOB and Chest Pain     COMPARISON:    Chest x-ray performed on 8/18/2018.     FINDINGS:    Minimal subpleural fibrotic change. Pulmonary hypertension.  Coronary artery calcifications. Moderate-sized hiatal hernia. There is  no pulmonary artery filling defect to suggest pulmonary embolism. There  is no thoracic adenopathy. No pleural or pericardial effusion.  Incidentally imaged upper abdomen is unremarkable. Bone windows show no  acute osseous abnormality.       Impression:          1. No PE.   2. Minimal subpleural fibrotic change.  Pulmonary hypertension. Coronary  artery calcifications. Moderate-sized hiatal hernia.     This report was finalized on 8/18/2018 9:14 AM by Dr. Franki Shaw MD.               Assessment     1.  Chest pain.  With both typical and atypical features for angina.  Rule out MI.  2.  Coronary calcification on CT of the chest  3.  Dyspnea.  No PE  4.  Right-sided carotid disease status post CEA  5.  Previous history of TIA  6.  Multiple cardiac risk factors-DM type II, hypertension and hyperlipidemia  7.  Hypothyroidism  8.  Hiatal hernia    Plan    1.  Obtain serial cardiac markers and ECGs  2.  Medications- continue aspirin 81 mg by mouth daily, resume atorvastatin 40 mg by mouth daily and start Toprol-XL 25 mg by mouth daily.  Patient is on lisinopril/hydrochlorothiazide 20/12.5 mg by mouth daily at home.  3.  Echocardiogram has been ordered to evaluate left ventricular function and wall motion  4.  If MI is ruled out, a Lexiscan Curtis perfusion study done tomorrow a.m. for ischemia evaluation  5.  I discussed her condition and plan of care with the patient.      Thanks for the consult        Brooke Almaguer MD  08/18/18  12:35 PM    Cc: Cristiana SANCHEZ

## 2018-08-18 NOTE — ED NOTES
Bedside report received from Luisana WOOTEN at this time   Pt resting quietly on stretcher with no complaints.  Pt AAOx4 with no resp distress noted, respirations even and unlabored.  Pt denies any needs at this time.  Skin PWD.  Pt family at bedside. Will continue to monitor and follow plan of care.  Bed rails up x2, bed in lowest position, call light in reach.        Laura Barboza RN  08/18/18 7302       Laura Barboza RN  08/18/18 6323

## 2018-08-18 NOTE — ED NOTES
Called CT to inform that a new IV has been started and informed consent for IV contrast has been singed.      Laura Barboza, RN  08/18/18 6313

## 2018-08-18 NOTE — ED NOTES
Assisted pt to bedside commode and assisted back to bed. Pt tolerated well.      Laura Valentine  08/18/18 0943

## 2018-08-19 ENCOUNTER — APPOINTMENT (OUTPATIENT)
Dept: CARDIOLOGY | Facility: HOSPITAL | Age: 78
End: 2018-08-19
Attending: INTERNAL MEDICINE

## 2018-08-19 ENCOUNTER — APPOINTMENT (OUTPATIENT)
Dept: NUCLEAR MEDICINE | Facility: HOSPITAL | Age: 78
End: 2018-08-19
Attending: INTERNAL MEDICINE

## 2018-08-19 ENCOUNTER — APPOINTMENT (OUTPATIENT)
Dept: CARDIOLOGY | Facility: HOSPITAL | Age: 78
End: 2018-08-19

## 2018-08-19 VITALS
SYSTOLIC BLOOD PRESSURE: 145 MMHG | BODY MASS INDEX: 33.46 KG/M2 | HEIGHT: 64 IN | HEART RATE: 62 BPM | RESPIRATION RATE: 18 BRPM | WEIGHT: 196 LBS | OXYGEN SATURATION: 99 % | DIASTOLIC BLOOD PRESSURE: 78 MMHG | TEMPERATURE: 98.1 F

## 2018-08-19 PROBLEM — R07.9 CHEST PAIN: Status: RESOLVED | Noted: 2018-08-18 | Resolved: 2018-08-19

## 2018-08-19 PROBLEM — R06.09 DYSPNEA ON EXERTION: Status: RESOLVED | Noted: 2017-07-07 | Resolved: 2018-08-19

## 2018-08-19 LAB
ALBUMIN SERPL-MCNC: 3.8 G/DL (ref 3.4–4.8)
ALBUMIN/GLOB SERPL: 1.7 G/DL (ref 1.5–2.5)
ALP SERPL-CCNC: 73 U/L (ref 35–104)
ALT SERPL W P-5'-P-CCNC: 9 U/L (ref 10–36)
ANION GAP SERPL CALCULATED.3IONS-SCNC: 5.9 MMOL/L (ref 3.6–11.2)
AST SERPL-CCNC: 15 U/L (ref 10–30)
BASOPHILS # BLD AUTO: 0.04 10*3/MM3 (ref 0–0.3)
BASOPHILS NFR BLD AUTO: 0.5 % (ref 0–2)
BH CV ECHO MEAS - % IVS THICK: 22.3 %
BH CV ECHO MEAS - % LVPW THICK: 35 %
BH CV ECHO MEAS - ACS: 1.6 CM
BH CV ECHO MEAS - AO MAX PG: 7.4 MMHG
BH CV ECHO MEAS - AO MEAN PG: 3.9 MMHG
BH CV ECHO MEAS - AO ROOT AREA (BSA CORRECTED): 1.3
BH CV ECHO MEAS - AO ROOT AREA: 5.2 CM^2
BH CV ECHO MEAS - AO ROOT DIAM: 2.6 CM
BH CV ECHO MEAS - AO V2 MAX: 135.7 CM/SEC
BH CV ECHO MEAS - AO V2 MEAN: 89.9 CM/SEC
BH CV ECHO MEAS - AO V2 VTI: 32 CM
BH CV ECHO MEAS - BSA(HAYCOCK): 2 M^2
BH CV ECHO MEAS - BSA: 1.9 M^2
BH CV ECHO MEAS - BZI_BMI: 33.6 KILOGRAMS/M^2
BH CV ECHO MEAS - BZI_METRIC_HEIGHT: 162.6 CM
BH CV ECHO MEAS - BZI_METRIC_WEIGHT: 88.9 KG
BH CV ECHO MEAS - EDV(CUBED): 143.1 ML
BH CV ECHO MEAS - EDV(MOD-SP4): 108 ML
BH CV ECHO MEAS - EDV(TEICH): 131.3 ML
BH CV ECHO MEAS - EF(CUBED): 43.8 %
BH CV ECHO MEAS - EF(MOD-SP4): 66.7 %
BH CV ECHO MEAS - EF(TEICH): 36.2 %
BH CV ECHO MEAS - ESV(CUBED): 80.4 ML
BH CV ECHO MEAS - ESV(MOD-SP4): 36 ML
BH CV ECHO MEAS - ESV(TEICH): 83.8 ML
BH CV ECHO MEAS - FS: 17.5 %
BH CV ECHO MEAS - IVS/LVPW: 1
BH CV ECHO MEAS - IVSD: 1.2 CM
BH CV ECHO MEAS - IVSS: 1.5 CM
BH CV ECHO MEAS - LA DIMENSION: 3.7 CM
BH CV ECHO MEAS - LA/AO: 1.4
BH CV ECHO MEAS - LV DIASTOLIC VOL/BSA (35-75): 55.7 ML/M^2
BH CV ECHO MEAS - LV MASS(C)D: 250.7 GRAMS
BH CV ECHO MEAS - LV MASS(C)DI: 129.3 GRAMS/M^2
BH CV ECHO MEAS - LV MASS(C)S: 270.6 GRAMS
BH CV ECHO MEAS - LV MASS(C)SI: 139.5 GRAMS/M^2
BH CV ECHO MEAS - LV SYSTOLIC VOL/BSA (12-30): 18.6 ML/M^2
BH CV ECHO MEAS - LVIDD: 5.2 CM
BH CV ECHO MEAS - LVIDS: 4.3 CM
BH CV ECHO MEAS - LVLD AP4: 7.3 CM
BH CV ECHO MEAS - LVLS AP4: 6.1 CM
BH CV ECHO MEAS - LVOT AREA (M): 2.8 CM^2
BH CV ECHO MEAS - LVOT AREA: 2.7 CM^2
BH CV ECHO MEAS - LVOT DIAM: 1.9 CM
BH CV ECHO MEAS - LVPWD: 1.2 CM
BH CV ECHO MEAS - LVPWS: 1.6 CM
BH CV ECHO MEAS - MV A MAX VEL: 123.4 CM/SEC
BH CV ECHO MEAS - MV E MAX VEL: 120.9 CM/SEC
BH CV ECHO MEAS - MV E/A: 0.98
BH CV ECHO MEAS - PA ACC SLOPE: 1250 CM/SEC^2
BH CV ECHO MEAS - PA ACC TIME: 0.1 SEC
BH CV ECHO MEAS - PA PR(ACCEL): 33.1 MMHG
BH CV ECHO MEAS - RAP SYSTOLE: 10 MMHG
BH CV ECHO MEAS - RVDD: 1.3 CM
BH CV ECHO MEAS - RVSP: 42.8 MMHG
BH CV ECHO MEAS - SI(AO): 85 ML/M^2
BH CV ECHO MEAS - SI(CUBED): 32.4 ML/M^2
BH CV ECHO MEAS - SI(MOD-SP4): 37.1 ML/M^2
BH CV ECHO MEAS - SI(TEICH): 24.5 ML/M^2
BH CV ECHO MEAS - SV(AO): 164.9 ML
BH CV ECHO MEAS - SV(CUBED): 62.7 ML
BH CV ECHO MEAS - SV(MOD-SP4): 72 ML
BH CV ECHO MEAS - SV(TEICH): 47.5 ML
BH CV ECHO MEAS - TR MAX VEL: 286.4 CM/SEC
BH CV STRESS BP STAGE 1: NORMAL
BH CV STRESS BP STAGE 2: NORMAL
BH CV STRESS COMMENTS STAGE 1: NORMAL
BH CV STRESS COMMENTS STAGE 2: NORMAL
BH CV STRESS DOSE REGADENOSON STAGE 1: 0.4
BH CV STRESS DURATION MIN STAGE 1: 0
BH CV STRESS DURATION MIN STAGE 2: 4
BH CV STRESS DURATION SEC STAGE 1: 10
BH CV STRESS DURATION SEC STAGE 2: 0
BH CV STRESS HR STAGE 1: 81
BH CV STRESS HR STAGE 2: 74
BH CV STRESS PROTOCOL 1: NORMAL
BH CV STRESS RECOVERY BP: NORMAL MMHG
BH CV STRESS RECOVERY HR: 74 BPM
BH CV STRESS STAGE 1: 1
BH CV STRESS STAGE 2: 2
BILIRUB SERPL-MCNC: 0.4 MG/DL (ref 0.2–1.8)
BUN BLD-MCNC: 22 MG/DL (ref 7–21)
BUN/CREAT SERPL: 34.9 (ref 7–25)
CALCIUM SPEC-SCNC: 9.5 MG/DL (ref 7.7–10)
CHLORIDE SERPL-SCNC: 107 MMOL/L (ref 99–112)
CHOLEST SERPL-MCNC: 211 MG/DL (ref 0–200)
CO2 SERPL-SCNC: 22.1 MMOL/L (ref 24.3–31.9)
CREAT BLD-MCNC: 0.63 MG/DL (ref 0.43–1.29)
DEPRECATED RDW RBC AUTO: 44.4 FL (ref 37–54)
EOSINOPHIL # BLD AUTO: 0.02 10*3/MM3 (ref 0–0.7)
EOSINOPHIL NFR BLD AUTO: 0.2 % (ref 0–7)
ERYTHROCYTE [DISTWIDTH] IN BLOOD BY AUTOMATED COUNT: 13.9 % (ref 11.5–14.5)
GFR SERPL CREATININE-BSD FRML MDRD: 91 ML/MIN/1.73
GLOBULIN UR ELPH-MCNC: 2.3 GM/DL
GLUCOSE BLD-MCNC: 108 MG/DL (ref 70–110)
HCT VFR BLD AUTO: 33.7 % (ref 37–47)
HDLC SERPL-MCNC: 50 MG/DL (ref 60–100)
HGB BLD-MCNC: 11.1 G/DL (ref 12–16)
IMM GRANULOCYTES # BLD: 0.03 10*3/MM3 (ref 0–0.03)
IMM GRANULOCYTES NFR BLD: 0.3 % (ref 0–0.5)
LDLC SERPL CALC-MCNC: 140 MG/DL (ref 0–100)
LDLC/HDLC SERPL: 2.8 {RATIO}
LV EF NUC BP: 68 %
LYMPHOCYTES # BLD AUTO: 1.78 10*3/MM3 (ref 1–3)
LYMPHOCYTES NFR BLD AUTO: 20.4 % (ref 16–46)
MAGNESIUM SERPL-MCNC: 2.1 MG/DL (ref 1.7–2.6)
MAXIMAL PREDICTED HEART RATE: 140 BPM
MAXIMAL PREDICTED HEART RATE: 140 BPM
MCH RBC QN AUTO: 29.6 PG (ref 27–33)
MCHC RBC AUTO-ENTMCNC: 32.9 G/DL (ref 33–37)
MCV RBC AUTO: 89.9 FL (ref 80–94)
MONOCYTES # BLD AUTO: 0.98 10*3/MM3 (ref 0.1–0.9)
MONOCYTES NFR BLD AUTO: 11.2 % (ref 0–12)
NEUTROPHILS # BLD AUTO: 5.88 10*3/MM3 (ref 1.4–6.5)
NEUTROPHILS NFR BLD AUTO: 67.4 % (ref 40–75)
OSMOLALITY SERPL CALC.SUM OF ELEC: 274 MOSM/KG (ref 273–305)
PERCENT MAX PREDICTED HR: 57.86 %
PHOSPHATE SERPL-MCNC: 3.5 MG/DL (ref 2.7–4.5)
PLATELET # BLD AUTO: 314 10*3/MM3 (ref 130–400)
PMV BLD AUTO: 10 FL (ref 6–10)
POTASSIUM BLD-SCNC: 3.8 MMOL/L (ref 3.5–5.3)
PROT SERPL-MCNC: 6.1 G/DL (ref 6–8)
RBC # BLD AUTO: 3.75 10*6/MM3 (ref 4.2–5.4)
SODIUM BLD-SCNC: 135 MMOL/L (ref 135–153)
STRESS BASELINE BP: NORMAL MMHG
STRESS BASELINE HR: 63 BPM
STRESS PERCENT HR: 68 %
STRESS POST PEAK BP: NORMAL MMHG
STRESS POST PEAK HR: 81 BPM
STRESS TARGET HR: 119 BPM
STRESS TARGET HR: 119 BPM
TRIGL SERPL-MCNC: 106 MG/DL (ref 0–150)
VLDLC SERPL-MCNC: 21.2 MG/DL
WBC NRBC COR # BLD: 8.73 10*3/MM3 (ref 4.5–12.5)

## 2018-08-19 PROCEDURE — G0378 HOSPITAL OBSERVATION PER HR: HCPCS

## 2018-08-19 PROCEDURE — 94799 UNLISTED PULMONARY SVC/PX: CPT

## 2018-08-19 PROCEDURE — 93306 TTE W/DOPPLER COMPLETE: CPT

## 2018-08-19 PROCEDURE — 25010000002 REGADENOSON 0.4 MG/5ML SOLUTION: Performed by: HOSPITALIST

## 2018-08-19 PROCEDURE — 84100 ASSAY OF PHOSPHORUS: CPT | Performed by: PHYSICIAN ASSISTANT

## 2018-08-19 PROCEDURE — 99217 PR OBSERVATION CARE DISCHARGE MANAGEMENT: CPT | Performed by: HOSPITALIST

## 2018-08-19 PROCEDURE — 78452 HT MUSCLE IMAGE SPECT MULT: CPT

## 2018-08-19 PROCEDURE — 80053 COMPREHEN METABOLIC PANEL: CPT | Performed by: PHYSICIAN ASSISTANT

## 2018-08-19 PROCEDURE — 0 TECHNETIUM SESTAMIBI: Performed by: HOSPITALIST

## 2018-08-19 PROCEDURE — 25010000002 HEPARIN (PORCINE) PER 1000 UNITS: Performed by: PHYSICIAN ASSISTANT

## 2018-08-19 PROCEDURE — A9500 TC99M SESTAMIBI: HCPCS | Performed by: HOSPITALIST

## 2018-08-19 PROCEDURE — 80061 LIPID PANEL: CPT | Performed by: PHYSICIAN ASSISTANT

## 2018-08-19 PROCEDURE — 96375 TX/PRO/DX INJ NEW DRUG ADDON: CPT

## 2018-08-19 PROCEDURE — 96372 THER/PROPH/DIAG INJ SC/IM: CPT

## 2018-08-19 PROCEDURE — 83735 ASSAY OF MAGNESIUM: CPT | Performed by: PHYSICIAN ASSISTANT

## 2018-08-19 PROCEDURE — 93017 CV STRESS TEST TRACING ONLY: CPT

## 2018-08-19 PROCEDURE — 85025 COMPLETE CBC W/AUTO DIFF WBC: CPT | Performed by: PHYSICIAN ASSISTANT

## 2018-08-19 RX ORDER — PANTOPRAZOLE SODIUM 40 MG/1
40 TABLET, DELAYED RELEASE ORAL
Status: DISCONTINUED | OUTPATIENT
Start: 2018-08-20 | End: 2018-08-19 | Stop reason: HOSPADM

## 2018-08-19 RX ORDER — OMEPRAZOLE 20 MG/1
20 CAPSULE, DELAYED RELEASE ORAL DAILY
Qty: 30 CAPSULE | Refills: 3 | Status: ON HOLD | OUTPATIENT
Start: 2018-08-19 | End: 2019-01-12

## 2018-08-19 RX ORDER — CETIRIZINE HYDROCHLORIDE 10 MG/1
5 TABLET ORAL DAILY
Status: DISCONTINUED | OUTPATIENT
Start: 2018-08-19 | End: 2018-08-19 | Stop reason: HOSPADM

## 2018-08-19 RX ADMIN — LISINOPRIL: 10 TABLET ORAL at 13:16

## 2018-08-19 RX ADMIN — ASPIRIN 81 MG: 81 TABLET, CHEWABLE ORAL at 11:33

## 2018-08-19 RX ADMIN — CETIRIZINE HYDROCHLORIDE 5 MG: 10 TABLET ORAL at 13:15

## 2018-08-19 RX ADMIN — TECHNETIUM TC 99M SESTAMIBI 1 DOSE: 1 INJECTION INTRAVENOUS at 09:15

## 2018-08-19 RX ADMIN — ACETAMINOPHEN 650 MG: 325 TABLET ORAL at 01:25

## 2018-08-19 RX ADMIN — REGADENOSON 0.4 MG: 0.08 INJECTION, SOLUTION INTRAVENOUS at 09:43

## 2018-08-19 RX ADMIN — HEPARIN SODIUM 5000 UNITS: 5000 INJECTION, SOLUTION INTRAVENOUS; SUBCUTANEOUS at 11:34

## 2018-08-19 RX ADMIN — METOPROLOL TARTRATE 25 MG: 25 TABLET, FILM COATED ORAL at 11:33

## 2018-08-19 RX ADMIN — TECHNETIUM TC 99M SESTAMIBI 1 DOSE: 1 INJECTION INTRAVENOUS at 07:50

## 2018-08-19 RX ADMIN — LEVOTHYROXINE SODIUM 137.5 MCG: 0.12 TABLET ORAL at 13:16

## 2018-08-19 RX ADMIN — PANTOPRAZOLE SODIUM 40 MG: 40 INJECTION, POWDER, FOR SOLUTION INTRAVENOUS at 11:34

## 2018-08-19 RX ADMIN — NITROGLYCERIN 0.5 INCH: 20 OINTMENT TOPICAL at 11:47

## 2018-08-19 NOTE — DISCHARGE SUMMARY
[facility] HOSPITALIST MEDICINE DISCHARGE SUMMARY    Patient Identification:  Name:  Jackie Erwin  Age:  78 y.o.  Sex:  female  :  1940  MRN:  [id number]  Visit Number:  [id number]    Date of Admission: 2018  Date of Discharge:  2018   DISCHARGE DISPOSITION   Stable  PCP: Cristiana Sánchez, APRN    DISCHARGE DIAGNOSIS : Chest pain a typical MI was ruled out, moderate hiatal hernia with symptoms of GERD will be tried with PPI for 2-3 months and lifestyle modification, essential hypertension sub-optimally controlled, history of carotid artery disease status post right carotid endarterectomy, acquired hypothyroidism, mild pulmonary hypertension, dyslipidemia, diabetes non-insulin-requiring, history of TIA, obesity with BMI of 33.6, slightly elevated d-dimer CT scan of the chest PE protocol is negative.    HOSPITAL COURSE  Patient is a 78 y.o. female presented to [facility] complaining of chest pain epigastric and retrosternal this was preceded by coughing nonproductive which the patient reports chronically.  Emergency room her EKG was unremarkable, d-dimer was slightly elevated hence CT scan of the chest PE protocol was performed which was negative for PE or pneumonia.  Because of her risk factors including diabetes and hypertension as well as calcification of her coronaries on CAT scan she was admitted for chest pain rule out MI.  During her stay she never have any recurrence of chest pain, serial cardiac enzymes are all negative, Dr. Niles Almaguer/cardiology service was consulted and recommended to optimize her blood pressure medication, with addition of beta blocker.  2-D echo was ordered which did not show any wall motion abnormality she does have mild pulmonary hypertension.  On history patient reports frequent heartburn and nocturnal coughing.  Her CAT scan also shows moderate size hiatal hernia.  Because of this she was placed on PPI trial and lifestyle modification.   Patient is to avoid caffeine containing beverages, chocolate, high-fat diet, oriented juice, and late meal before going to bed.  She is to elevate her head at night.  If her symptoms continues after 2-3 months of PPI she will be seen the gastroenterologist as discussed with patient.  The rest of her stay was uneventful plan is to discharge her home today.  Patient's daughter also was asking if the patient would benefit pulmonary consult of this time.  Options were discussed including trial of PPI before seeing pulmonary service which they agree.      VITAL SIGNS:  1    08/18/18  0602 08/18/18  1023 08/19/18  0249   Weight: 83.9 kg (185 lb) 89 kg (196 lb 3 oz) 88.9 kg (196 lb)     Body mass index is 33.64 kg/m².  Vitals:    08/19/18 1324   BP: 145/78   Pulse: 62   Resp:    Temp:    SpO2:      PHYSICAL EXAM:  General: Comfortable,awake, alert, oriented to self, place, and time, well-developed and well-nourished.  No respiratory distress.    Skin:  Skin is warm and dry. No rash noted. No pallor.    HENT:  Head:  Normocephalic and atraumatic.  Mouth:  Moist mucous membranes.    Eyes:  Conjunctivae and EOM are normal.  Pupils are equal, round, and reactive to light.  No scleral icterus.    Neck:  Neck supple.  No JVD present.    Pulmonary/Chest:  No respiratory distress, no wheezes, no crackles, with normal breath sounds and good air movement.  Cardiovascular:  Normal rate, regular rhythm and normal heart sounds with no murmur.  Abdominal:  Soft.  Bowel sounds are normal.  No distension and no tenderness.   Extremities:  No edema, no tenderness, and no deformity.  No red or swollen joints anywhere.  Strong pulses in all 4 extremities with no clubbing, no cyanosis.  Neurological:  Motor strength equal no obvious deficit, sensory grossly intact.   No cranial nerve deficit.  No tongue deviation.  No facial droop.  No slurred speech.         ----------    DISCHARGE MEDICATIONS:     Discharge Medications      New Medications       Instructions Start Date   metoprolol tartrate 25 MG tablet  Commonly known as:  LOPRESSOR   25 mg, Oral, Every 12 Hours Scheduled      omeprazole 20 MG capsule  Commonly known as:  PRILOSEC   20 mg, Oral, Daily         Continue These Medications      Instructions Start Date   aspirin 81 MG EC tablet   81 mg, Oral, Every Night at Bedtime      atorvastatin 40 MG tablet  Commonly known as:  LIPITOR   40 mg, Oral, Nightly      CLARITIN 10 MG tablet  Generic drug:  loratadine   10 mg, Oral, Daily      levothyroxine 137 MCG tablet  Commonly known as:  SYNTHROID, LEVOTHROID   137 mcg, Oral, Daily      lisinopril-hydrochlorothiazide 20-12.5 MG per tablet  Commonly known as:  PRINZIDE,ZESTORETIC   1 tablet, Oral, Daily                   Additional Instructions for the Follow-ups that You Need to Schedule     Discharge Follow-up with PCP    As directed      Currently Documented PCP:  Cristiana Sánchez APRN  PCP Phone Number:  [phone]    Follow Up Details:  DANIEL Donovan           Follow-up Information     Cristiana Sánchez APRN Follow up in 1 week(s).    Specialty:  Family Medicine  Why:  PATIENT WILL BE NOTIFIED ABOUT APPT ON MONDAY  Contact information:  [address] [place] [address]  [phone]                   Ольга Foy MD  08/19/18  1:58 PM    Please note that this discharge summary required more than 30 minutes to complete.

## 2018-08-19 NOTE — PLAN OF CARE
Problem: Patient Care Overview  Goal: Individualization and Mutuality  Outcome: Ongoing (interventions implemented as appropriate)    Goal: Discharge Needs Assessment  Outcome: Ongoing (interventions implemented as appropriate)    Goal: Interprofessional Rounds/Family Conf  Outcome: Ongoing (interventions implemented as appropriate)      Problem: Pain, Acute (Adult)  Goal: Identify Related Risk Factors and Signs and Symptoms  Outcome: Ongoing (interventions implemented as appropriate)    Goal: Acceptable Pain Control/Comfort Level  Outcome: Ongoing (interventions implemented as appropriate)      Problem: Fall Risk (Adult)  Goal: Identify Related Risk Factors and Signs and Symptoms  Outcome: Ongoing (interventions implemented as appropriate)    Goal: Absence of Fall  Outcome: Ongoing (interventions implemented as appropriate)

## 2018-08-19 NOTE — NURSING NOTE
Patient states she needed to got BR and brush teeth and get ready for bed she had not slept since 5AM this morning. She stated wearing the oxygen prevented her from sleeping.  Patients Oxygen level 96% on room air moving walking around room informed patient since she did not wear oxygen at home that we could take it off and we would monitor her oxygen levels

## 2018-08-19 NOTE — PROGRESS NOTES
"Reason for follow-up:  Chest pain and calcification of the coronaries on CT exam    Subjective:    No history of chest pain for more than 24 hours.  Shortness of breath-stable.  Scheduled for a nuclear stress test today.      Medication Review:     aspirin 81 mg Oral Daily   atorvastatin 40 mg Oral Nightly   heparin (porcine) 5,000 Units Subcutaneous Q12H   metoprolol tartrate 25 mg Oral Q12H   nitroglycerin 0.5 inch Topical Q6H   nitroglycerin 1 inch Topical Once   pantoprazole 40 mg Oral Q AM   pantoprazole 40 mg Intravenous Q AM         Vital Sign Min/Max for last 24 hours  Temp  Min: 97.8 °F (36.6 °C)  Max: 99.2 °F (37.3 °C)   BP  Min: 136/80  Max: 165/86   Pulse  Min: 76  Max: 111   Resp  Min: 18  Max: 18   SpO2  Min: 95 %  Max: 98 %   No Data Recorded   Weight  Min: 88.9 kg (196 lb)  Max: 89 kg (196 lb 3 oz)     Flowsheet Rows      First Filed Value   Admission Height  165.1 cm (65\") Documented at 08/18/2018 0602   Admission Weight  83.9 kg (185 lb) Documented at 08/18/2018 0602          Physical Exam:     General Appearance:    Alert, cooperative, in no acute distress   Head:    Normocephalic, without obvious abnormality, atraumatic   Eyes:            Lids and lashes normal, conjunctivae and sclerae normal, no   icterus, no pallor, corneas clear, PERRLA   Ears:    Ears appear intact with no abnormalities noted   Throat:   No oral lesions, no thrush, oral mucosa moist   Neck:   No adenopathy, supple, trachea midline, no thyromegaly, no   carotid bruit, no JVD.  No carotid bruit    Back:     No kyphosis present, no scoliosis present, no skin lesions,      erythema or scars, no tenderness to percussion or                   palpation,   range of motion normal   Lungs:     Clear to auscultation,respirations regular, even and                  unlabored    Heart:    Regular rhythm and normal rate, normal S1 and S2, no            murmur, no gallop, no rub, no click   Chest Wall:    No abnormalities observed "   Abdomen:     Normal bowel sounds, no masses, no organomegaly, soft        non-tender, non-distended, no guarding, no rebound                tenderness   Rectal:     Deferred   Extremities:  Pedal edema           Telemetry  Normal sinus rhythm      Echocardiogram-    · Left ventricular systolic function is normal. Estimated EF appears to be in the range of 66 - 70%  · All left ventricular wall segments contract normally.  · Left ventricular diastolic dysfunction.  · Left atrial cavity size is mildly dilated.  · The aortic valve is structurally normal. No aortic valve regurgitation is present. No aortic valve stenosis is present  · Mild mitral valve regurgitation is present  · Mild tricuspid valve regurgitation is present.  · Mild pulmonary hypertension is present.  · There is no evidence of pericardial effusion        Labs    Results from last 7 days  Lab Units 08/19/18  0129 08/18/18  0645   WBC 10*3/mm3 8.73 8.78   HEMOGLOBIN g/dL 11.1* 12.0   HEMATOCRIT % 33.7* 35.8*   PLATELETS 10*3/mm3 314 317       Results from last 7 days  Lab Units 08/19/18  0129 08/18/18  0645   SODIUM mmol/L 135 137   POTASSIUM mmol/L 3.8 3.8   CHLORIDE mmol/L 107 103   CO2 mmol/L 22.1* 23.8*   BUN mg/dL 22* 17   CREATININE mg/dL 0.63 0.78   CALCIUM mg/dL 9.5 9.9   GLUCOSE mg/dL 108 113*       Results from last 7 days  Lab Units 08/19/18  0129 08/18/18  0645   BILIRUBIN mg/dL 0.4 0.4   ALK PHOS U/L 73 84   AST (SGOT) U/L 15 18   ALT (SGPT) U/L 9* 14       Results from last 7 days  Lab Units 08/19/18  0129 08/18/18  0928   MAGNESIUM mg/dL 2.1 2.0               Results from last 7 days  Lab Units 08/18/18  2250 08/18/18  1602 08/18/18  1052  08/18/18  0645   CK TOTAL U/L 38 32 25  --  25   TROPONIN I ng/mL 0.019 <0.006 <0.006  < > <0.006   CK MB INDEX % 1.4  --   --   --   --    MYOGLOBIN ng/mL 53.0 28.0 39.0  --   --    < > = values in this interval not displayed.    Results from last 7 days  Lab Units 08/18/18  8559   PH, ARTERIAL pH  units 7.451*   PO2 ART mm Hg 71.9*   PCO2, ARTERIAL mm Hg 35.1   HCO3 ART mmol/L 23.9         Assessment    1.  Chest pain.   stable.  No MI.  2.  Coronary calcification on CT of the chest  3.  Dyspnea.  No PE  4.  Right-sided carotid disease status post CEA  5.  Previous history of TIA  6.  Multiple cardiac risk factors-DM type II, hypertension and hyperlipidemia  7.  Hypothyroidism  8.  Hiatal hernia    Plan    1.  Medications-continue aspirin, atorvastatin and metoprolol  2.  Reviewed echocardiographic study as above  3.  Nuclear stress test today for ischemia evaluation    .    Brooke Almaguer MD  08/19/18  10:13 AM

## 2018-08-19 NOTE — NURSING NOTE
Deandra Del Valle notified of patient complaints of insomnia and that no new orders had been noted when the doctor had been notified.

## 2018-08-19 NOTE — PROGRESS NOTES
[facility] HOSPITALIST PROGRESS NOTE     Patient Identification:  Name:  Jackie Erwin  Age:  78 y.o.  Sex:  female  :  1940  MRN:  [id number]  Visit Number:  [id number]  Primary Care Provider:  Cristiana Sánchez APRN    Length of stay:  0    Subjective:  No recurrence of chest pain, vital signs stable except for blood pressure that's sub-optimal controlled, daughter is asking me if the patient would benefit seeing a pulmonologist at this time because of her frequent dry cough.  Based on her findings including moderate hiatal hernia and reflux symptoms, I gave the family options to have Dr. Casanova see her today or to try first with lifestyle modification including upright in bed at night, trial of PPI.  Family prefers to try this first and if she continues to have symptoms will arrange outpatient appointment.  She ruled out for MI by cardiac enzymes, 2-D echo shows normal ejection fraction with no wall motion abnormality.  She has stress test done pending results.  Dr. Almaguer's evaluation and recommendation noted and appreciated.     Chief Complaint: Chest pain  ----------------------------------------------------------------------------------------------------------------------  Current Hospital Meds:    aspirin 81 mg Oral Daily   atorvastatin 40 mg Oral Nightly   cetirizine 5 mg Oral Daily   heparin (porcine) 5,000 Units Subcutaneous Q12H   levothyroxine 137.5 mcg Oral Q AM   lisinopril-hydroCHLOROthiazide (ZESTORTIC) 20-12.5 mg combo dose  Oral Q24H   metoprolol tartrate 25 mg Oral Q12H   nitroglycerin 0.5 inch Topical Q6H   nitroglycerin 1 inch Topical Once   pantoprazole 40 mg Intravenous Q AM        ----------------------------------------------------------------------------------------------------------------------  Vital Signs:  Temp:  [97.8 °F (36.6 °C)-99.2 °F (37.3 °C)] 98 °F (36.7 °C)  Heart Rate:  [] 76  Resp:  [18] 18  BP: (136-156)/(71-88) 152/76       Tele: Sinus  rhythm 56 bpm  1    08/18/18  0602 08/18/18  1023 08/19/18  0249   Weight: 83.9 kg (185 lb) 89 kg (196 lb 3 oz) 88.9 kg (196 lb)     Body mass index is 33.64 kg/m².    Intake/Output Summary (Last 24 hours) at 08/19/18 1148  Last data filed at 08/19/18 0249   Gross per 24 hour   Intake              480 ml   Output              550 ml   Net              -70 ml     NPO Diet  ----------------------------------------------------------------------------------------------------------------------  Physical exam:  General: Comfortable,awake, alert, oriented to self, place, and time, well-developed and well-nourished.  No respiratory distress.    Skin:  Skin is warm and dry. No rash noted. No pallor.    HENT:  Head:  Normocephalic and atraumatic.  Mouth:  Moist mucous membranes.    Eyes:  Conjunctivae and EOM are normal.  Pupils are equal, round, and reactive to light.  No scleral icterus.    Neck:  Neck supple.  No JVD present.    Pulmonary/Chest:  No respiratory distress, no wheezes, no crackles, with normal breath sounds and good air movement.  Cardiovascular:  Normal rate, regular rhythm and normal heart sounds with no murmur.  Abdominal:  Soft.  Bowel sounds are normal.  No distension and no tenderness.   Extremities:  No edema, no tenderness, and no deformity.  No red or swollen joints anywhere.  Strong pulses in all 4 extremities with no clubbing, no cyanosis.  Neurological:  Motor strength equal no obvious deficit, sensory grossly intact.   No cranial nerve deficit.  No tongue deviation.  No facial droop.  No slurred speech.        ----------------------------------------------------------------------------------------------------------------------  ----------------------------------------------------------------------------------------------------------------------    Results from last 7 days  Lab Units 08/18/18  2250 08/18/18  1602 08/18/18  1052  08/18/18  0645   CK TOTAL U/L 38 32 25  --  25   CKMB ng/mL 0.53  <0.18 <0.18  --  <0.18   CK MB INDEX % 1.4  --   --   --   --    TROPONIN I ng/mL 0.019 <0.006 <0.006  < > <0.006   MYOGLOBIN ng/mL 53.0 28.0 39.0  --   --    < > = values in this interval not displayed.    Results from last 7 days  Lab Units 08/19/18  0129 08/18/18  0645   WBC 10*3/mm3 8.73 8.78   HEMOGLOBIN g/dL 11.1* 12.0   HEMATOCRIT % 33.7* 35.8*   MCV fL 89.9 87.3   MCHC g/dL 32.9* 33.5   PLATELETS 10*3/mm3 314 317       Results from last 7 days  Lab Units 08/18/18  0729   PH, ARTERIAL pH units 7.451*   PO2 ART mm Hg 71.9*   PCO2, ARTERIAL mm Hg 35.1   HCO3 ART mmol/L 23.9       Results from last 7 days  Lab Units 08/19/18  0129 08/18/18  0928 08/18/18  0645   SODIUM mmol/L 135  --  137   POTASSIUM mmol/L 3.8  --  3.8   MAGNESIUM mg/dL 2.1 2.0  --    CHLORIDE mmol/L 107  --  103   CO2 mmol/L 22.1*  --  23.8*   BUN mg/dL 22*  --  17   CREATININE mg/dL 0.63  --  0.78   EGFR IF NONAFRICN AM mL/min/1.73 91  --  71   CALCIUM mg/dL 9.5  --  9.9   GLUCOSE mg/dL 108  --  113*   ALBUMIN g/dL 3.80  --  4.10   BILIRUBIN mg/dL 0.4  --  0.4   ALK PHOS U/L 73  --  84   AST (SGOT) U/L 15  --  18   ALT (SGPT) U/L 9*  --  14   Estimated Creatinine Clearance: 60.6 mL/min (by C-G formula based on SCr of 0.63 mg/dL).    No results found for: AMMONIA    Results from last 7 days  Lab Units 08/19/18  0129   CHOLESTEROL mg/dL 211*   TRIGLYCERIDES mg/dL 106   HDL CHOL mg/dL 50*   LDL CHOL mg/dL 140*                   I have personally looked at the labs and they are summarized above.  ----------------------------------------------------------------------------------------------------------------------  Imaging Results (last 24 hours)     ** No results found for the last 24 hours. **        ----------------------------------------------------------------------------------------------------------------------  Assessment and Plan:  - Chest pain MI was ruled out  - Essential hypertension  - Moderate hiatal hernia with symptoms of GERD  -  History of carotid artery disease status post right carotid endarterectomy  - Essential hypertension  - Acquired hypothyroidism    Follow-up results of nuclear stress test, lifestyle modification for reflux symptoms and started on PPI which we will changed to by mouth, it's been inadvertently ordered as an IV instead of by mouth PPI.  Plan was discussed with daughter and agreed.       Ольга Foy MD  08/19/18  11:48 AM

## 2018-11-07 ENCOUNTER — HOSPITAL ENCOUNTER (OUTPATIENT)
Dept: GENERAL RADIOLOGY | Facility: HOSPITAL | Age: 78
Discharge: HOME | End: 2018-11-07
Admitting: NURSE PRACTITIONER

## 2018-11-07 ENCOUNTER — TRANSCRIBE ORDERS (OUTPATIENT)
Dept: GENERAL RADIOLOGY | Facility: HOSPITAL | Age: 78
End: 2018-11-07

## 2018-11-07 DIAGNOSIS — M54.5 LOW BACK PAIN, UNSPECIFIED BACK PAIN LATERALITY, UNSPECIFIED CHRONICITY, WITH SCIATICA PRESENCE UNSPECIFIED: ICD-10-CM

## 2018-11-07 DIAGNOSIS — M54.2 NECK PAIN: ICD-10-CM
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
## 2018-11-07 DIAGNOSIS — M54.5 LOW BACK PAIN, UNSPECIFIED BACK PAIN LATERALITY, UNSPECIFIED CHRONICITY, WITH SCIATICA PRESENCE UNSPECIFIED: Primary | ICD-10-CM

## 2018-11-07 PROCEDURE — 72050 X-RAY EXAM NECK SPINE 4/5VWS: CPT | Performed by: RADIOLOGY

## 2018-11-07 PROCEDURE — 72110 X-RAY EXAM L-2 SPINE 4/>VWS: CPT | Performed by: RADIOLOGY

## 2018-11-07 PROCEDURE — 72050 X-RAY EXAM NECK SPINE 4/5VWS: CPT

## 2018-11-07 PROCEDURE — 72110 X-RAY EXAM L-2 SPINE 4/>VWS: CPT

## 2018-11-07 PROCEDURE — 72072 X-RAY EXAM THORAC SPINE 3VWS: CPT

## 2018-11-07 PROCEDURE — 72072 X-RAY EXAM THORAC SPINE 3VWS: CPT | Performed by: RADIOLOGY

## 2018-12-20 ENCOUNTER — TRANSCRIBE ORDERS (OUTPATIENT)
Dept: ADMINISTRATIVE | Facility: HOSPITAL | Age: 78
End: 2018-12-20

## 2018-12-20 DIAGNOSIS — H47.10 OPTIC NERVE EDEMA: Primary | ICD-10-CM

## 2018-12-20 DIAGNOSIS — H46.9 OPTIC NEUROPATHY: ICD-10-CM

## 2018-12-20 DIAGNOSIS — R58 HEMORRHAGE: ICD-10-CM

## 2018-12-20 DIAGNOSIS — H53.40 VISUAL FIELD DEFECT: ICD-10-CM

## 2018-12-26 ENCOUNTER — LAB (OUTPATIENT)
Dept: LAB | Facility: HOSPITAL | Age: 78
End: 2018-12-26
Attending: OPHTHALMOLOGY

## 2018-12-26 ENCOUNTER — TRANSCRIBE ORDERS (OUTPATIENT)
Dept: GENERAL RADIOLOGY | Facility: HOSPITAL | Age: 78
End: 2018-12-26

## 2018-12-26 DIAGNOSIS — R51.9 HEADACHE, UNSPECIFIED HEADACHE TYPE: ICD-10-CM

## 2018-12-26 DIAGNOSIS — H46.9 OPTIC NEUROPATHY: ICD-10-CM

## 2018-12-26 DIAGNOSIS — H47.10 OPTIC NERVE EDEMA: ICD-10-CM

## 2018-12-26 DIAGNOSIS — H53.40 VISUAL FIELD DEFECT: ICD-10-CM

## 2018-12-26 DIAGNOSIS — H53.40 VISUAL FIELD DEFECT: Primary | ICD-10-CM

## 2018-12-26 LAB
BUN BLD-MCNC: 21 MG/DL (ref 7–21)
CREAT BLD-MCNC: 0.7 MG/DL (ref 0.43–1.29)
CRP SERPL-MCNC: <0.5 MG/DL (ref 0–0.99)
DEPRECATED RDW RBC AUTO: 42.7 FL (ref 37–54)
ERYTHROCYTE [DISTWIDTH] IN BLOOD BY AUTOMATED COUNT: 13.3 % (ref 11.5–14.5)
ERYTHROCYTE [SEDIMENTATION RATE] IN BLOOD: 33 MM/HR (ref 0–30)
FOLATE SERPL-MCNC: 14.9 NG/ML (ref 5.4–20)
GFR SERPL CREATININE-BSD FRML MDRD: 81 ML/MIN/1.73
HCT VFR BLD AUTO: 37.8 % (ref 37–47)
HGB BLD-MCNC: 12.4 G/DL (ref 12–16)
MCH RBC QN AUTO: 29.2 PG (ref 27–33)
MCHC RBC AUTO-ENTMCNC: 32.8 G/DL (ref 33–37)
MCV RBC AUTO: 88.9 FL (ref 80–94)
PLATELET # BLD AUTO: 354 10*3/MM3 (ref 130–400)
PMV BLD AUTO: 10.6 FL (ref 6–10)
RBC # BLD AUTO: 4.25 10*6/MM3 (ref 4.2–5.4)
VIT B12 BLD-MCNC: 333 PG/ML (ref 211–911)
WBC NRBC COR # BLD: 6.18 10*3/MM3 (ref 4.5–12.5)

## 2018-12-26 PROCEDURE — 82746 ASSAY OF FOLIC ACID SERUM: CPT

## 2018-12-26 PROCEDURE — 82607 VITAMIN B-12: CPT

## 2018-12-26 PROCEDURE — 82565 ASSAY OF CREATININE: CPT

## 2018-12-26 PROCEDURE — 36415 COLL VENOUS BLD VENIPUNCTURE: CPT

## 2018-12-26 PROCEDURE — 85652 RBC SED RATE AUTOMATED: CPT

## 2018-12-26 PROCEDURE — 84520 ASSAY OF UREA NITROGEN: CPT

## 2018-12-26 PROCEDURE — 85027 COMPLETE CBC AUTOMATED: CPT

## 2018-12-26 PROCEDURE — 86140 C-REACTIVE PROTEIN: CPT

## 2018-12-31 ENCOUNTER — HOSPITAL ENCOUNTER (OUTPATIENT)
Dept: CARDIOLOGY | Facility: HOSPITAL | Age: 78
Discharge: HOME | End: 2018-12-31
Attending: OPHTHALMOLOGY | Admitting: OPHTHALMOLOGY

## 2018-12-31 ENCOUNTER — APPOINTMENT (OUTPATIENT)
Dept: MRI IMAGING | Facility: HOSPITAL | Age: 78
End: 2018-12-31
Attending: OPHTHALMOLOGY

## 2018-12-31 DIAGNOSIS — H53.40 VISUAL FIELD DEFECT: ICD-10-CM

## 2018-12-31 DIAGNOSIS — H47.10 OPTIC NERVE EDEMA: ICD-10-CM

## 2018-12-31 DIAGNOSIS — H46.9 OPTIC NEUROPATHY: ICD-10-CM

## 2018-12-31 DIAGNOSIS — R58 HEMORRHAGE: ICD-10-CM

## 2018-12-31 PROCEDURE — 93880 EXTRACRANIAL BILAT STUDY: CPT

## 2018-12-31 PROCEDURE — 93880 EXTRACRANIAL BILAT STUDY: CPT | Performed by: RADIOLOGY

## 2019-01-10 ENCOUNTER — HOSPITAL ENCOUNTER (EMERGENCY)
Facility: HOSPITAL | Age: 79
Discharge: HOME | End: 2019-01-11
Attending: GENERAL ACUTE CARE HOSPITAL | Admitting: GENERAL ACUTE CARE HOSPITAL

## 2019-01-10 DIAGNOSIS — M54.2 NECK PAIN: Primary | ICD-10-CM

## 2019-01-10 PROCEDURE — 36415 COLL VENOUS BLD VENIPUNCTURE: CPT

## 2019-01-10 PROCEDURE — 85025 COMPLETE CBC W/AUTO DIFF WBC: CPT | Performed by: GENERAL ACUTE CARE HOSPITAL

## 2019-01-10 PROCEDURE — 36415 COLL VENOUS BLD VENIPUNCTURE: CPT | Performed by: GENERAL ACUTE CARE HOSPITAL

## 2019-01-10 PROCEDURE — 86140 C-REACTIVE PROTEIN: CPT | Performed by: GENERAL ACUTE CARE HOSPITAL

## 2019-01-10 PROCEDURE — 80053 COMPREHEN METABOLIC PANEL: CPT | Performed by: GENERAL ACUTE CARE HOSPITAL

## 2019-01-10 PROCEDURE — 99284 EMERGENCY DEPT VISIT MOD MDM: CPT

## 2019-01-10 PROCEDURE — 96374 THER/PROPH/DIAG INJ IV PUSH: CPT

## 2019-01-10 PROCEDURE — 85652 RBC SED RATE AUTOMATED: CPT | Performed by: GENERAL ACUTE CARE HOSPITAL

## 2019-01-11 ENCOUNTER — TELEPHONE (OUTPATIENT)
Dept: CARDIAC SURGERY | Facility: CLINIC | Age: 79
End: 2019-01-11

## 2019-01-11 ENCOUNTER — APPOINTMENT (OUTPATIENT)
Dept: CT IMAGING | Facility: HOSPITAL | Age: 79
End: 2019-01-11

## 2019-01-11 VITALS
HEART RATE: 70 BPM | DIASTOLIC BLOOD PRESSURE: 72 MMHG | OXYGEN SATURATION: 97 % | RESPIRATION RATE: 18 BRPM | WEIGHT: 180 LBS | SYSTOLIC BLOOD PRESSURE: 132 MMHG | BODY MASS INDEX: 30.73 KG/M2 | TEMPERATURE: 99.1 F | HEIGHT: 64 IN

## 2019-01-11 LAB
ALBUMIN SERPL-MCNC: 4 G/DL (ref 3.4–4.8)
ALBUMIN/GLOB SERPL: 1.4 G/DL (ref 1.5–2.5)
ALP SERPL-CCNC: 96 U/L (ref 35–104)
ALT SERPL W P-5'-P-CCNC: 17 U/L (ref 10–36)
ANION GAP SERPL CALCULATED.3IONS-SCNC: 9.5 MMOL/L (ref 3.6–11.2)
AST SERPL-CCNC: 18 U/L (ref 10–30)
BASOPHILS # BLD AUTO: 0.07 10*3/MM3 (ref 0–0.3)
BASOPHILS NFR BLD AUTO: 0.8 % (ref 0–2)
BILIRUB SERPL-MCNC: 0.3 MG/DL (ref 0.2–1.8)
BUN BLD-MCNC: 22 MG/DL (ref 7–21)
BUN/CREAT SERPL: 28.2 (ref 7–25)
CALCIUM SPEC-SCNC: 9.7 MG/DL (ref 7.7–10)
CHLORIDE SERPL-SCNC: 101 MMOL/L (ref 99–112)
CO2 SERPL-SCNC: 25.5 MMOL/L (ref 24.3–31.9)
CREAT BLD-MCNC: 0.78 MG/DL (ref 0.43–1.29)
CRP SERPL-MCNC: 0.72 MG/DL (ref 0–0.99)
DEPRECATED RDW RBC AUTO: 41.2 FL (ref 37–54)
EOSINOPHIL # BLD AUTO: 0.29 10*3/MM3 (ref 0–0.7)
EOSINOPHIL NFR BLD AUTO: 3.4 % (ref 0–7)
ERYTHROCYTE [DISTWIDTH] IN BLOOD BY AUTOMATED COUNT: 13.1 % (ref 11.5–14.5)
ERYTHROCYTE [SEDIMENTATION RATE] IN BLOOD: 39 MM/HR (ref 0–30)
GFR SERPL CREATININE-BSD FRML MDRD: 71 ML/MIN/1.73
GLOBULIN UR ELPH-MCNC: 2.8 GM/DL
GLUCOSE BLD-MCNC: 114 MG/DL (ref 70–110)
HCT VFR BLD AUTO: 36.7 % (ref 37–47)
HGB BLD-MCNC: 12.3 G/DL (ref 12–16)
IMM GRANULOCYTES # BLD AUTO: 0.02 10*3/MM3 (ref 0–0.03)
IMM GRANULOCYTES NFR BLD AUTO: 0.2 % (ref 0–0.5)
LYMPHOCYTES # BLD AUTO: 2.02 10*3/MM3 (ref 1–3)
LYMPHOCYTES NFR BLD AUTO: 23.5 % (ref 16–46)
MCH RBC QN AUTO: 29.2 PG (ref 27–33)
MCHC RBC AUTO-ENTMCNC: 33.5 G/DL (ref 33–37)
MCV RBC AUTO: 87.2 FL (ref 80–94)
MONOCYTES # BLD AUTO: 1.04 10*3/MM3 (ref 0.1–0.9)
MONOCYTES NFR BLD AUTO: 12.1 % (ref 0–12)
NEUTROPHILS # BLD AUTO: 5.14 10*3/MM3 (ref 1.4–6.5)
NEUTROPHILS NFR BLD AUTO: 60 % (ref 40–75)
OSMOLALITY SERPL CALC.SUM OF ELEC: 276.2 MOSM/KG (ref 273–305)
PLATELET # BLD AUTO: 307 10*3/MM3 (ref 130–400)
PMV BLD AUTO: 9.7 FL (ref 6–10)
POTASSIUM BLD-SCNC: 3.6 MMOL/L (ref 3.5–5.3)
PROT SERPL-MCNC: 6.8 G/DL (ref 6–8)
RBC # BLD AUTO: 4.21 10*6/MM3 (ref 4.2–5.4)
SODIUM BLD-SCNC: 136 MMOL/L (ref 135–153)
WBC NRBC COR # BLD: 8.58 10*3/MM3 (ref 4.5–12.5)

## 2019-01-11 PROCEDURE — 96374 THER/PROPH/DIAG INJ IV PUSH: CPT

## 2019-01-11 PROCEDURE — 70498 CT ANGIOGRAPHY NECK: CPT

## 2019-01-11 PROCEDURE — 25010000002 KETOROLAC TROMETHAMINE PER 15 MG: Performed by: GENERAL ACUTE CARE HOSPITAL

## 2019-01-11 PROCEDURE — 70498 CT ANGIOGRAPHY NECK: CPT | Performed by: RADIOLOGY

## 2019-01-11 PROCEDURE — 0 IOPAMIDOL PER 1 ML: Performed by: GENERAL ACUTE CARE HOSPITAL

## 2019-01-11 RX ORDER — KETOROLAC TROMETHAMINE 30 MG/ML
10 INJECTION, SOLUTION INTRAMUSCULAR; INTRAVENOUS ONCE
Status: COMPLETED | OUTPATIENT
Start: 2019-01-11 | End: 2019-01-11

## 2019-01-11 RX ORDER — DIAZEPAM 5 MG/1
2.5 TABLET ORAL EVERY 6 HOURS PRN
Status: DISCONTINUED | OUTPATIENT
Start: 2019-01-11 | End: 2019-01-11 | Stop reason: HOSPADM

## 2019-01-11 RX ORDER — DIAZEPAM 2 MG/1
2 TABLET ORAL EVERY 6 HOURS PRN
Qty: 6 TABLET | Refills: 0 | Status: ON HOLD | OUTPATIENT
Start: 2019-01-11 | End: 2019-01-12

## 2019-01-11 RX ADMIN — KETOROLAC TROMETHAMINE 10 MG: 30 INJECTION, SOLUTION INTRAMUSCULAR; INTRAVENOUS at 01:22

## 2019-01-11 RX ADMIN — DIAZEPAM 2.5 MG: 5 TABLET ORAL at 02:30

## 2019-01-11 RX ADMIN — IOPAMIDOL 100 ML: 755 INJECTION, SOLUTION INTRAVENOUS at 01:06

## 2019-01-11 NOTE — TELEPHONE ENCOUNTER
Called pt for her new pt appt with our office with [facility] in [place], when I talked to pt she stated that she wanted to cx appt due to having other appts and wanted to wait and r/s I talked to referring office (Raheem) she asked office if it would be okay to have pt keep the following appt (2/6/19 @ 12) and have the ref on hold until then. I told her that I will do that.    I am going to at the end of the month of January 2019 call the pt back and see if pt would want to keep this appt

## 2019-01-12 ENCOUNTER — APPOINTMENT (OUTPATIENT)
Dept: GENERAL RADIOLOGY | Facility: HOSPITAL | Age: 79
End: 2019-01-12

## 2019-01-12 ENCOUNTER — TELEPHONE (OUTPATIENT)
Dept: EMERGENCY DEPT | Facility: HOSPITAL | Age: 79
End: 2019-01-12

## 2019-01-12 ENCOUNTER — APPOINTMENT (OUTPATIENT)
Dept: CT IMAGING | Facility: HOSPITAL | Age: 79
End: 2019-01-12

## 2019-01-12 ENCOUNTER — HOSPITAL ENCOUNTER (INPATIENT)
Facility: HOSPITAL | Age: 79
LOS: 3 days | Discharge: HOME | End: 2019-01-15
Attending: FAMILY MEDICINE | Admitting: HOSPITALIST

## 2019-01-12 DIAGNOSIS — M54.9 INTRACTABLE BACK PAIN: ICD-10-CM

## 2019-01-12 DIAGNOSIS — M54.2 ACUTE NECK PAIN: ICD-10-CM

## 2019-01-12 DIAGNOSIS — M54.16 LUMBAR RADICULOPATHY, ACUTE: Primary | ICD-10-CM

## 2019-01-12 LAB
ALBUMIN SERPL-MCNC: 4 G/DL (ref 3.4–4.8)
ALBUMIN/GLOB SERPL: 1.4 G/DL (ref 1.5–2.5)
ALP SERPL-CCNC: 93 U/L (ref 35–104)
ALT SERPL W P-5'-P-CCNC: 19 U/L (ref 10–36)
ANION GAP SERPL CALCULATED.3IONS-SCNC: 6.7 MMOL/L (ref 3.6–11.2)
APPEARANCE CSF: CLEAR
APPEARANCE CSF: CLEAR
AST SERPL-CCNC: 26 U/L (ref 10–30)
BASOPHILS # BLD AUTO: 0.04 10*3/MM3 (ref 0–0.3)
BASOPHILS NFR BLD AUTO: 0.4 % (ref 0–2)
BILIRUB SERPL-MCNC: 1.1 MG/DL (ref 0.2–1.8)
BILIRUB UR QL STRIP: NEGATIVE
BUN BLD-MCNC: 26 MG/DL (ref 7–21)
BUN/CREAT SERPL: 36.1 (ref 7–25)
CALCIUM SPEC-SCNC: 9.6 MG/DL (ref 7.7–10)
CHLORIDE SERPL-SCNC: 101 MMOL/L (ref 99–112)
CHROMATIN AB SERPL-ACNC: 14 IU/ML (ref 0–14)
CLARITY UR: CLEAR
CO2 SERPL-SCNC: 25.3 MMOL/L (ref 24.3–31.9)
COLOR CSF: COLORLESS
COLOR CSF: COLORLESS
COLOR UR: ABNORMAL
CREAT BLD-MCNC: 0.72 MG/DL (ref 0.43–1.29)
CRP SERPL-MCNC: 7.94 MG/DL (ref 0–0.99)
DEPRECATED RDW RBC AUTO: 42.9 FL (ref 37–54)
EOSINOPHIL # BLD AUTO: 0.15 10*3/MM3 (ref 0–0.7)
EOSINOPHIL NFR BLD AUTO: 1.6 % (ref 0–7)
ERYTHROCYTE [DISTWIDTH] IN BLOOD BY AUTOMATED COUNT: 13.3 % (ref 11.5–14.5)
ERYTHROCYTE [SEDIMENTATION RATE] IN BLOOD: 73 MM/HR (ref 0–30)
GFR SERPL CREATININE-BSD FRML MDRD: 78 ML/MIN/1.73
GLOBULIN UR ELPH-MCNC: 2.9 GM/DL
GLUCOSE BLD-MCNC: 122 MG/DL (ref 70–110)
GLUCOSE BLDC GLUCOMTR-MCNC: 134 MG/DL (ref 70–130)
GLUCOSE BLDC GLUCOMTR-MCNC: 196 MG/DL (ref 70–130)
GLUCOSE CSF-MCNC: 68 MG/DL (ref 50–75)
GLUCOSE UR STRIP-MCNC: NEGATIVE MG/DL
HAEM INFLU AG CSF QL: NEGATIVE
HBA1C MFR BLD: 5.9 % (ref 4.5–5.7)
HCT VFR BLD AUTO: 35.6 % (ref 37–47)
HGB BLD-MCNC: 11.9 G/DL (ref 12–16)
HGB UR QL STRIP.AUTO: NEGATIVE
HOLD SPECIMEN: NORMAL
IMM GRANULOCYTES # BLD AUTO: 0.04 10*3/MM3 (ref 0–0.03)
IMM GRANULOCYTES NFR BLD AUTO: 0.4 % (ref 0–0.5)
KETONES UR QL STRIP: NEGATIVE
LEUKOCYTE ESTERASE UR QL STRIP.AUTO: NEGATIVE
LYMPHOCYTES # BLD AUTO: 1.42 10*3/MM3 (ref 1–3)
LYMPHOCYTES NFR BLD AUTO: 15.3 % (ref 16–46)
MCH RBC QN AUTO: 29.2 PG (ref 27–33)
MCHC RBC AUTO-ENTMCNC: 33.4 G/DL (ref 33–37)
MCV RBC AUTO: 87.3 FL (ref 80–94)
MONOCYTES # BLD AUTO: 1.61 10*3/MM3 (ref 0.1–0.9)
MONOCYTES NFR BLD AUTO: 17.3 % (ref 0–12)
N MEN SG A+Y AG CSF QL: NEGATIVE
N MEN SG B AG CSF QL: NEGATIVE
N MEN SG C+W135 AG CSF QL: NEGATIVE
NEUTROPHILS # BLD AUTO: 6.04 10*3/MM3 (ref 1.4–6.5)
NEUTROPHILS NFR BLD AUTO: 65 % (ref 40–75)
NITRITE UR QL STRIP: NEGATIVE
NUC CELL # CSF MANUAL: 0 /MM3 (ref 0–5)
NUC CELL # CSF MANUAL: 1 /MM3 (ref 0–5)
OSMOLALITY SERPL CALC.SUM OF ELEC: 272.4 MOSM/KG (ref 273–305)
PH UR STRIP.AUTO: <=5 [PH] (ref 5–8)
PLATELET # BLD AUTO: 282 10*3/MM3 (ref 130–400)
PMV BLD AUTO: 10.1 FL (ref 6–10)
POTASSIUM BLD-SCNC: 3.8 MMOL/L (ref 3.5–5.3)
PROT CSF-MCNC: 59.8 MG/DL (ref 15–45)
PROT SERPL-MCNC: 6.9 G/DL (ref 6–8)
PROT UR QL STRIP: NEGATIVE
RBC # BLD AUTO: 4.08 10*6/MM3 (ref 4.2–5.4)
RBC # CSF MANUAL: 21 /MM3 (ref 0–0)
RBC # CSF MANUAL: 23 /MM3 (ref 0–0)
S PNEUM AG SER QL: NEGATIVE
SODIUM BLD-SCNC: 133 MMOL/L (ref 135–153)
SP GR UR STRIP: 1.03 (ref 1–1.03)
SPECIMEN VOL CSF: 1.5 ML
SPECIMEN VOL CSF: 2 ML
T3FREE SERPL-MCNC: 1.8 PG/ML (ref 2.3–4.2)
T4 FREE SERPL-MCNC: 1.63 NG/DL (ref 0.89–1.76)
TSH SERPL DL<=0.05 MIU/L-ACNC: 0.29 MIU/ML (ref 0.55–4.78)
TUBE # CSF: 1
TUBE # CSF: 4
URATE SERPL-MCNC: 5.8 MG/DL (ref 2.4–5.7)
UROBILINOGEN UR QL STRIP: ABNORMAL
WBC NRBC COR # BLD: 9.3 10*3/MM3 (ref 4.5–12.5)

## 2019-01-12 PROCEDURE — 25010000002 KETOROLAC TROMETHAMINE PER 15 MG: Performed by: PHYSICIAN ASSISTANT

## 2019-01-12 PROCEDURE — 72125 CT NECK SPINE W/O DYE: CPT | Performed by: RADIOLOGY

## 2019-01-12 PROCEDURE — 86403 PARTICLE AGGLUT ANTBDY SCRN: CPT | Performed by: PHYSICIAN ASSISTANT

## 2019-01-12 PROCEDURE — 89050 BODY FLUID CELL COUNT: CPT | Performed by: FAMILY MEDICINE

## 2019-01-12 PROCEDURE — 84439 ASSAY OF FREE THYROXINE: CPT | Performed by: HOSPITALIST

## 2019-01-12 PROCEDURE — 72131 CT LUMBAR SPINE W/O DYE: CPT

## 2019-01-12 PROCEDURE — 73502 X-RAY EXAM HIP UNI 2-3 VIEWS: CPT | Performed by: RADIOLOGY

## 2019-01-12 PROCEDURE — 89050 BODY FLUID CELL COUNT: CPT | Performed by: PHYSICIAN ASSISTANT

## 2019-01-12 PROCEDURE — 73502 X-RAY EXAM HIP UNI 2-3 VIEWS: CPT

## 2019-01-12 PROCEDURE — 84550 ASSAY OF BLOOD/URIC ACID: CPT | Performed by: NURSE PRACTITIONER

## 2019-01-12 PROCEDURE — 82945 GLUCOSE OTHER FLUID: CPT | Performed by: PHYSICIAN ASSISTANT

## 2019-01-12 PROCEDURE — 86140 C-REACTIVE PROTEIN: CPT | Performed by: PHYSICIAN ASSISTANT

## 2019-01-12 PROCEDURE — 25010000002 HEPARIN (PORCINE) PER 1000 UNITS: Performed by: INTERNAL MEDICINE

## 2019-01-12 PROCEDURE — 84443 ASSAY THYROID STIM HORMONE: CPT | Performed by: HOSPITALIST

## 2019-01-12 PROCEDURE — 82962 GLUCOSE BLOOD TEST: CPT

## 2019-01-12 PROCEDURE — 72128 CT CHEST SPINE W/O DYE: CPT

## 2019-01-12 PROCEDURE — 87070 CULTURE OTHR SPECIMN AEROBIC: CPT | Performed by: PHYSICIAN ASSISTANT

## 2019-01-12 PROCEDURE — 72131 CT LUMBAR SPINE W/O DYE: CPT | Performed by: RADIOLOGY

## 2019-01-12 PROCEDURE — 84157 ASSAY OF PROTEIN OTHER: CPT | Performed by: PHYSICIAN ASSISTANT

## 2019-01-12 PROCEDURE — 84481 FREE ASSAY (FT-3): CPT | Performed by: HOSPITALIST

## 2019-01-12 PROCEDURE — 51798 US URINE CAPACITY MEASURE: CPT

## 2019-01-12 PROCEDURE — 86431 RHEUMATOID FACTOR QUANT: CPT | Performed by: HOSPITALIST

## 2019-01-12 PROCEDURE — 86038 ANTINUCLEAR ANTIBODIES: CPT | Performed by: HOSPITALIST

## 2019-01-12 PROCEDURE — 36415 COLL VENOUS BLD VENIPUNCTURE: CPT | Performed by: HOSPITALIST

## 2019-01-12 PROCEDURE — 87015 SPECIMEN INFECT AGNT CONCNTJ: CPT | Performed by: PHYSICIAN ASSISTANT

## 2019-01-12 PROCEDURE — 85652 RBC SED RATE AUTOMATED: CPT | Performed by: PHYSICIAN ASSISTANT

## 2019-01-12 PROCEDURE — 87205 SMEAR GRAM STAIN: CPT | Performed by: PHYSICIAN ASSISTANT

## 2019-01-12 PROCEDURE — 009U3ZX DRAINAGE OF SPINAL CANAL, PERCUTANEOUS APPROACH, DIAGNOSTIC: ICD-10-PCS | Performed by: EMERGENCY MEDICINE

## 2019-01-12 PROCEDURE — 72128 CT CHEST SPINE W/O DYE: CPT | Performed by: RADIOLOGY

## 2019-01-12 PROCEDURE — 73562 X-RAY EXAM OF KNEE 3: CPT | Performed by: RADIOLOGY

## 2019-01-12 PROCEDURE — 85025 COMPLETE CBC W/AUTO DIFF WBC: CPT | Performed by: PHYSICIAN ASSISTANT

## 2019-01-12 PROCEDURE — 73562 X-RAY EXAM OF KNEE 3: CPT

## 2019-01-12 PROCEDURE — 72125 CT NECK SPINE W/O DYE: CPT

## 2019-01-12 PROCEDURE — 99285 EMERGENCY DEPT VISIT HI MDM: CPT

## 2019-01-12 PROCEDURE — 99222 1ST HOSP IP/OBS MODERATE 55: CPT | Performed by: HOSPITALIST

## 2019-01-12 PROCEDURE — 25010000002 METHYLPREDNISOLONE PER 40 MG: Performed by: PHYSICIAN ASSISTANT

## 2019-01-12 PROCEDURE — 81003 URINALYSIS AUTO W/O SCOPE: CPT | Performed by: PHYSICIAN ASSISTANT

## 2019-01-12 PROCEDURE — 83036 HEMOGLOBIN GLYCOSYLATED A1C: CPT | Performed by: NURSE PRACTITIONER

## 2019-01-12 PROCEDURE — 80053 COMPREHEN METABOLIC PANEL: CPT | Performed by: PHYSICIAN ASSISTANT

## 2019-01-12 RX ORDER — KETOROLAC TROMETHAMINE 30 MG/ML
15 INJECTION, SOLUTION INTRAMUSCULAR; INTRAVENOUS ONCE
Status: COMPLETED | OUTPATIENT
Start: 2019-01-12 | End: 2019-01-12

## 2019-01-12 RX ORDER — SODIUM CHLORIDE 0.9 % (FLUSH) 0.9 %
3-10 SYRINGE (ML) INJECTION AS NEEDED
Status: DISCONTINUED | OUTPATIENT
Start: 2019-01-12 | End: 2019-01-15 | Stop reason: HOSPADM

## 2019-01-12 RX ORDER — ACETAMINOPHEN 325 MG/1
650 TABLET ORAL EVERY 4 HOURS PRN
Status: DISCONTINUED | OUTPATIENT
Start: 2019-01-12 | End: 2019-01-15 | Stop reason: HOSPADM

## 2019-01-12 RX ORDER — SODIUM CHLORIDE 0.9 % (FLUSH) 0.9 %
10 SYRINGE (ML) INJECTION AS NEEDED
Status: DISCONTINUED | OUTPATIENT
Start: 2019-01-12 | End: 2019-01-15 | Stop reason: HOSPADM

## 2019-01-12 RX ORDER — LISINOPRIL 10 MG/1
10 TABLET ORAL DAILY
Status: ON HOLD | COMMUNITY
End: 2019-01-13

## 2019-01-12 RX ORDER — HEPARIN SODIUM 5000 [USP'U]/ML
5000 INJECTION, SOLUTION INTRAVENOUS; SUBCUTANEOUS EVERY 12 HOURS SCHEDULED
Status: DISCONTINUED | OUTPATIENT
Start: 2019-01-12 | End: 2019-01-15 | Stop reason: HOSPADM

## 2019-01-12 RX ORDER — LIDOCAINE HYDROCHLORIDE 10 MG/ML
5 INJECTION, SOLUTION EPIDURAL; INFILTRATION; INTRACAUDAL; PERINEURAL ONCE
Status: DISCONTINUED | OUTPATIENT
Start: 2019-01-12 | End: 2019-01-12

## 2019-01-12 RX ORDER — NICOTINE POLACRILEX 4 MG
15 LOZENGE BUCCAL
Status: DISCONTINUED | OUTPATIENT
Start: 2019-01-12 | End: 2019-01-15 | Stop reason: HOSPADM

## 2019-01-12 RX ORDER — HYDROCODONE BITARTRATE AND ACETAMINOPHEN 5; 325 MG/1; MG/1
1 TABLET ORAL ONCE
Status: COMPLETED | OUTPATIENT
Start: 2019-01-12 | End: 2019-01-12

## 2019-01-12 RX ORDER — SODIUM CHLORIDE 9 MG/ML
75 INJECTION, SOLUTION INTRAVENOUS CONTINUOUS
Status: DISCONTINUED | OUTPATIENT
Start: 2019-01-12 | End: 2019-01-14

## 2019-01-12 RX ORDER — DEXTROSE MONOHYDRATE 25 G/50ML
25 INJECTION, SOLUTION INTRAVENOUS
Status: DISCONTINUED | OUTPATIENT
Start: 2019-01-12 | End: 2019-01-15 | Stop reason: HOSPADM

## 2019-01-12 RX ORDER — SODIUM CHLORIDE 0.9 % (FLUSH) 0.9 %
3 SYRINGE (ML) INJECTION EVERY 12 HOURS SCHEDULED
Status: DISCONTINUED | OUTPATIENT
Start: 2019-01-12 | End: 2019-01-15 | Stop reason: HOSPADM

## 2019-01-12 RX ORDER — METHYLPREDNISOLONE SODIUM SUCCINATE 40 MG/ML
80 INJECTION, POWDER, LYOPHILIZED, FOR SOLUTION INTRAMUSCULAR; INTRAVENOUS ONCE
Status: COMPLETED | OUTPATIENT
Start: 2019-01-12 | End: 2019-01-12

## 2019-01-12 RX ORDER — LIDOCAINE HYDROCHLORIDE 10 MG/ML
INJECTION, SOLUTION INFILTRATION; PERINEURAL
Status: COMPLETED
Start: 2019-01-12 | End: 2019-01-12

## 2019-01-12 RX ORDER — HYDROCODONE BITARTRATE AND ACETAMINOPHEN 5; 325 MG/1; MG/1
1 TABLET ORAL EVERY 6 HOURS PRN
Status: DISCONTINUED | OUTPATIENT
Start: 2019-01-12 | End: 2019-01-15 | Stop reason: HOSPADM

## 2019-01-12 RX ADMIN — HEPARIN SODIUM 5000 UNITS: 5000 INJECTION INTRAVENOUS; SUBCUTANEOUS at 21:41

## 2019-01-12 RX ADMIN — METHYLPREDNISOLONE SODIUM SUCCINATE 80 MG: 40 INJECTION, POWDER, FOR SOLUTION INTRAMUSCULAR; INTRAVENOUS at 15:00

## 2019-01-12 RX ADMIN — SODIUM CHLORIDE 75 ML/HR: 9 INJECTION, SOLUTION INTRAVENOUS at 11:48

## 2019-01-12 RX ADMIN — SODIUM CHLORIDE 500 ML: 9 INJECTION, SOLUTION INTRAVENOUS at 13:19

## 2019-01-12 RX ADMIN — LIDOCAINE HYDROCHLORIDE 1 ML: 10 INJECTION, SOLUTION INFILTRATION; PERINEURAL at 16:33

## 2019-01-12 RX ADMIN — SODIUM CHLORIDE, PRESERVATIVE FREE 3 ML: 5 INJECTION INTRAVENOUS at 21:41

## 2019-01-12 RX ADMIN — KETOROLAC TROMETHAMINE 15 MG: 30 INJECTION, SOLUTION INTRAMUSCULAR; INTRAVENOUS at 13:20

## 2019-01-12 RX ADMIN — SODIUM CHLORIDE 500 ML: 9 INJECTION, SOLUTION INTRAVENOUS at 14:41

## 2019-01-12 RX ADMIN — HYDROCODONE BITARTRATE AND ACETAMINOPHEN 1 TABLET: 5; 325 TABLET ORAL at 11:44

## 2019-01-13 ENCOUNTER — APPOINTMENT (OUTPATIENT)
Dept: ULTRASOUND IMAGING | Facility: HOSPITAL | Age: 79
End: 2019-01-13

## 2019-01-13 LAB
ANION GAP SERPL CALCULATED.3IONS-SCNC: 8.3 MMOL/L (ref 3.6–11.2)
BASOPHILS # BLD AUTO: 0.01 10*3/MM3 (ref 0–0.3)
BASOPHILS NFR BLD AUTO: 0.1 % (ref 0–2)
BUN BLD-MCNC: 26 MG/DL (ref 7–21)
BUN/CREAT SERPL: 41.9 (ref 7–25)
CALCIUM SPEC-SCNC: 9.2 MG/DL (ref 7.7–10)
CHLORIDE SERPL-SCNC: 102 MMOL/L (ref 99–112)
CO2 SERPL-SCNC: 22.7 MMOL/L (ref 24.3–31.9)
CREAT BLD-MCNC: 0.62 MG/DL (ref 0.43–1.29)
DEPRECATED RDW RBC AUTO: 40.4 FL (ref 37–54)
EOSINOPHIL # BLD AUTO: 0 10*3/MM3 (ref 0–0.7)
EOSINOPHIL NFR BLD AUTO: 0 % (ref 0–7)
ERYTHROCYTE [DISTWIDTH] IN BLOOD BY AUTOMATED COUNT: 12.9 % (ref 11.5–14.5)
GFR SERPL CREATININE-BSD FRML MDRD: 93 ML/MIN/1.73
GLUCOSE BLD-MCNC: 141 MG/DL (ref 70–110)
GLUCOSE BLDC GLUCOMTR-MCNC: 111 MG/DL (ref 70–130)
GLUCOSE BLDC GLUCOMTR-MCNC: 131 MG/DL (ref 70–130)
GLUCOSE BLDC GLUCOMTR-MCNC: 137 MG/DL (ref 70–130)
HCT VFR BLD AUTO: 34.3 % (ref 37–47)
HGB BLD-MCNC: 11.4 G/DL (ref 12–16)
IMM GRANULOCYTES # BLD AUTO: 0.02 10*3/MM3 (ref 0–0.03)
IMM GRANULOCYTES NFR BLD AUTO: 0.3 % (ref 0–0.5)
LYMPHOCYTES # BLD AUTO: 0.8 10*3/MM3 (ref 1–3)
LYMPHOCYTES NFR BLD AUTO: 10 % (ref 16–46)
MCH RBC QN AUTO: 29.2 PG (ref 27–33)
MCHC RBC AUTO-ENTMCNC: 33.2 G/DL (ref 33–37)
MCV RBC AUTO: 87.9 FL (ref 80–94)
MONOCYTES # BLD AUTO: 0.45 10*3/MM3 (ref 0.1–0.9)
MONOCYTES NFR BLD AUTO: 5.6 % (ref 0–12)
NEUTROPHILS # BLD AUTO: 6.7 10*3/MM3 (ref 1.4–6.5)
NEUTROPHILS NFR BLD AUTO: 84 % (ref 40–75)
OSMOLALITY SERPL CALC.SUM OF ELEC: 273.5 MOSM/KG (ref 273–305)
PLATELET # BLD AUTO: 261 10*3/MM3 (ref 130–400)
PMV BLD AUTO: 10.1 FL (ref 6–10)
POTASSIUM BLD-SCNC: 3.6 MMOL/L (ref 3.5–5.3)
RBC # BLD AUTO: 3.9 10*6/MM3 (ref 4.2–5.4)
SODIUM BLD-SCNC: 133 MMOL/L (ref 135–153)
WBC NRBC COR # BLD: 7.98 10*3/MM3 (ref 4.5–12.5)

## 2019-01-13 PROCEDURE — 99232 SBSQ HOSP IP/OBS MODERATE 35: CPT | Performed by: INTERNAL MEDICINE

## 2019-01-13 PROCEDURE — 85025 COMPLETE CBC W/AUTO DIFF WBC: CPT | Performed by: INTERNAL MEDICINE

## 2019-01-13 PROCEDURE — 25010000002 HEPARIN (PORCINE) PER 1000 UNITS: Performed by: INTERNAL MEDICINE

## 2019-01-13 PROCEDURE — 80048 BASIC METABOLIC PNL TOTAL CA: CPT | Performed by: INTERNAL MEDICINE

## 2019-01-13 PROCEDURE — 82962 GLUCOSE BLOOD TEST: CPT

## 2019-01-13 PROCEDURE — 76536 US EXAM OF HEAD AND NECK: CPT | Performed by: RADIOLOGY

## 2019-01-13 PROCEDURE — 76536 US EXAM OF HEAD AND NECK: CPT

## 2019-01-13 RX ORDER — DIAZEPAM 2 MG/1
2 TABLET ORAL EVERY 6 HOURS PRN
COMMUNITY
End: 2021-12-13

## 2019-01-13 RX ORDER — DIAZEPAM 5 MG/1
2.5 TABLET ORAL EVERY 6 HOURS PRN
Status: DISCONTINUED | OUTPATIENT
Start: 2019-01-13 | End: 2019-01-15 | Stop reason: HOSPADM

## 2019-01-13 RX ORDER — DIAZEPAM 2 MG/1
2 TABLET ORAL EVERY 6 HOURS PRN
Status: CANCELLED | OUTPATIENT
Start: 2019-01-13

## 2019-01-13 RX ORDER — TRAMADOL HYDROCHLORIDE 50 MG/1
50 TABLET ORAL 2 TIMES DAILY PRN
Status: CANCELLED | OUTPATIENT
Start: 2019-01-13

## 2019-01-13 RX ORDER — LEVOTHYROXINE SODIUM 0.07 MG/1
112 TABLET ORAL
Status: DISCONTINUED | OUTPATIENT
Start: 2019-01-13 | End: 2019-01-15 | Stop reason: HOSPADM

## 2019-01-13 RX ORDER — DIAZEPAM 2 MG/1
2 TABLET ORAL EVERY 6 HOURS PRN
Status: DISCONTINUED | OUTPATIENT
Start: 2019-01-13 | End: 2019-01-13 | Stop reason: DRUGHIGH

## 2019-01-13 RX ORDER — LISINOPRIL 10 MG/1
20 TABLET ORAL
Status: DISCONTINUED | OUTPATIENT
Start: 2019-01-13 | End: 2019-01-15 | Stop reason: HOSPADM

## 2019-01-13 RX ORDER — TRAMADOL HYDROCHLORIDE 50 MG/1
50 TABLET ORAL 2 TIMES DAILY PRN
COMMUNITY
End: 2019-01-15 | Stop reason: HOSPADM

## 2019-01-13 RX ORDER — LISINOPRIL AND HYDROCHLOROTHIAZIDE 20; 12.5 MG/1; MG/1
1 TABLET ORAL DAILY
COMMUNITY
End: 2021-12-13

## 2019-01-13 RX ORDER — ASPIRIN 81 MG/1
81 TABLET ORAL NIGHTLY
Status: CANCELLED | OUTPATIENT
Start: 2019-01-13

## 2019-01-13 RX ORDER — CARVEDILOL 12.5 MG/1
12.5 TABLET ORAL 2 TIMES DAILY WITH MEALS
COMMUNITY
End: 2021-12-13

## 2019-01-13 RX ORDER — CARVEDILOL 6.25 MG/1
12.5 TABLET ORAL 2 TIMES DAILY WITH MEALS
Status: CANCELLED | OUTPATIENT
Start: 2019-01-13

## 2019-01-13 RX ORDER — CARVEDILOL 6.25 MG/1
12.5 TABLET ORAL 2 TIMES DAILY WITH MEALS
Status: DISCONTINUED | OUTPATIENT
Start: 2019-01-13 | End: 2019-01-15 | Stop reason: HOSPADM

## 2019-01-13 RX ORDER — ASPIRIN 81 MG/1
81 TABLET ORAL DAILY
Status: DISCONTINUED | OUTPATIENT
Start: 2019-01-13 | End: 2019-01-15 | Stop reason: HOSPADM

## 2019-01-13 RX ADMIN — HEPARIN SODIUM 5000 UNITS: 5000 INJECTION INTRAVENOUS; SUBCUTANEOUS at 21:21

## 2019-01-13 RX ADMIN — LISINOPRIL 20 MG: 10 TABLET ORAL at 17:12

## 2019-01-13 RX ADMIN — SODIUM CHLORIDE 75 ML/HR: 9 INJECTION, SOLUTION INTRAVENOUS at 09:27

## 2019-01-13 RX ADMIN — HEPARIN SODIUM 5000 UNITS: 5000 INJECTION INTRAVENOUS; SUBCUTANEOUS at 09:27

## 2019-01-13 RX ADMIN — HYDROCODONE BITARTRATE AND ACETAMINOPHEN 1 TABLET: 5; 325 TABLET ORAL at 13:58

## 2019-01-13 RX ADMIN — HYDROCODONE BITARTRATE AND ACETAMINOPHEN 1 TABLET: 5; 325 TABLET ORAL at 03:52

## 2019-01-13 RX ADMIN — SODIUM CHLORIDE 75 ML/HR: 9 INJECTION, SOLUTION INTRAVENOUS at 21:22

## 2019-01-13 RX ADMIN — ASPIRIN 81 MG: 81 TABLET ORAL at 17:12

## 2019-01-13 RX ADMIN — CARVEDILOL 12.5 MG: 6.25 TABLET, FILM COATED ORAL at 17:12

## 2019-01-13 RX ADMIN — HYDROCODONE BITARTRATE AND ACETAMINOPHEN 1 TABLET: 5; 325 TABLET ORAL at 21:22

## 2019-01-13 RX ADMIN — SODIUM CHLORIDE, PRESERVATIVE FREE 3 ML: 5 INJECTION INTRAVENOUS at 09:27

## 2019-01-14 LAB
ANA SER QL: NEGATIVE
ANION GAP SERPL CALCULATED.3IONS-SCNC: 8.3 MMOL/L (ref 3.6–11.2)
BUN BLD-MCNC: 21 MG/DL (ref 7–21)
BUN/CREAT SERPL: 38.2 (ref 7–25)
CALCIUM SPEC-SCNC: 8.9 MG/DL (ref 7.7–10)
CHLORIDE SERPL-SCNC: 108 MMOL/L (ref 99–112)
CO2 SERPL-SCNC: 23.7 MMOL/L (ref 24.3–31.9)
CREAT BLD-MCNC: 0.55 MG/DL (ref 0.43–1.29)
CRP SERPL-MCNC: 5.94 MG/DL (ref 0–0.99)
ERYTHROCYTE [SEDIMENTATION RATE] IN BLOOD: 85 MM/HR (ref 0–30)
GFR SERPL CREATININE-BSD FRML MDRD: 106 ML/MIN/1.73
GLUCOSE BLD-MCNC: 91 MG/DL (ref 70–110)
GLUCOSE BLDC GLUCOMTR-MCNC: 106 MG/DL (ref 70–130)
GLUCOSE BLDC GLUCOMTR-MCNC: 88 MG/DL (ref 70–130)
GLUCOSE BLDC GLUCOMTR-MCNC: 91 MG/DL (ref 70–130)
GLUCOSE BLDC GLUCOMTR-MCNC: 95 MG/DL (ref 70–130)
OSMOLALITY SERPL CALC.SUM OF ELEC: 282 MOSM/KG (ref 273–305)
POTASSIUM BLD-SCNC: 3.9 MMOL/L (ref 3.5–5.3)
SODIUM BLD-SCNC: 140 MMOL/L (ref 135–153)

## 2019-01-14 PROCEDURE — 85652 RBC SED RATE AUTOMATED: CPT | Performed by: INTERNAL MEDICINE

## 2019-01-14 PROCEDURE — 86140 C-REACTIVE PROTEIN: CPT | Performed by: INTERNAL MEDICINE

## 2019-01-14 PROCEDURE — 25010000002 HEPARIN (PORCINE) PER 1000 UNITS: Performed by: INTERNAL MEDICINE

## 2019-01-14 PROCEDURE — 36415 COLL VENOUS BLD VENIPUNCTURE: CPT | Performed by: INTERNAL MEDICINE

## 2019-01-14 PROCEDURE — 97167 OT EVAL HIGH COMPLEX 60 MIN: CPT

## 2019-01-14 PROCEDURE — 80048 BASIC METABOLIC PNL TOTAL CA: CPT | Performed by: INTERNAL MEDICINE

## 2019-01-14 PROCEDURE — 94799 UNLISTED PULMONARY SVC/PX: CPT

## 2019-01-14 PROCEDURE — 97162 PT EVAL MOD COMPLEX 30 MIN: CPT

## 2019-01-14 PROCEDURE — 99232 SBSQ HOSP IP/OBS MODERATE 35: CPT | Performed by: HOSPITALIST

## 2019-01-14 PROCEDURE — 82962 GLUCOSE BLOOD TEST: CPT

## 2019-01-14 PROCEDURE — 97116 GAIT TRAINING THERAPY: CPT

## 2019-01-14 RX ORDER — ATORVASTATIN CALCIUM 40 MG/1
40 TABLET, FILM COATED ORAL NIGHTLY
Status: DISCONTINUED | OUTPATIENT
Start: 2019-01-14 | End: 2019-01-15 | Stop reason: HOSPADM

## 2019-01-14 RX ADMIN — HYDROCODONE BITARTRATE AND ACETAMINOPHEN 1 TABLET: 5; 325 TABLET ORAL at 08:41

## 2019-01-14 RX ADMIN — CARVEDILOL 12.5 MG: 6.25 TABLET, FILM COATED ORAL at 17:47

## 2019-01-14 RX ADMIN — HYDROCODONE BITARTRATE AND ACETAMINOPHEN 1 TABLET: 5; 325 TABLET ORAL at 20:30

## 2019-01-14 RX ADMIN — HEPARIN SODIUM 5000 UNITS: 5000 INJECTION INTRAVENOUS; SUBCUTANEOUS at 08:31

## 2019-01-14 RX ADMIN — ASPIRIN 81 MG: 81 TABLET ORAL at 08:30

## 2019-01-14 RX ADMIN — ATORVASTATIN CALCIUM 40 MG: 40 TABLET, FILM COATED ORAL at 20:29

## 2019-01-14 RX ADMIN — SODIUM CHLORIDE, PRESERVATIVE FREE 3 ML: 5 INJECTION INTRAVENOUS at 08:31

## 2019-01-14 RX ADMIN — HEPARIN SODIUM 5000 UNITS: 5000 INJECTION INTRAVENOUS; SUBCUTANEOUS at 20:29

## 2019-01-14 RX ADMIN — LISINOPRIL 20 MG: 10 TABLET ORAL at 08:31

## 2019-01-14 RX ADMIN — LEVOTHYROXINE SODIUM 112 MCG: 75 TABLET ORAL at 05:31

## 2019-01-15 VITALS
DIASTOLIC BLOOD PRESSURE: 60 MMHG | WEIGHT: 193.4 LBS | SYSTOLIC BLOOD PRESSURE: 123 MMHG | HEIGHT: 63 IN | RESPIRATION RATE: 18 BRPM | TEMPERATURE: 97.8 F | OXYGEN SATURATION: 95 % | HEART RATE: 60 BPM | BODY MASS INDEX: 34.27 KG/M2

## 2019-01-15 PROBLEM — M54.16 LUMBAR RADICULOPATHY, ACUTE: Status: RESOLVED | Noted: 2019-01-12 | Resolved: 2019-01-15

## 2019-01-15 LAB
25(OH)D3 SERPL-MCNC: 16 NG/ML
BACTERIA SPEC AEROBE CULT: NO GROWTH
GLUCOSE BLDC GLUCOMTR-MCNC: 88 MG/DL (ref 70–130)
GLUCOSE BLDC GLUCOMTR-MCNC: 96 MG/DL (ref 70–130)
GRAM STN SPEC: NORMAL

## 2019-01-15 PROCEDURE — 82962 GLUCOSE BLOOD TEST: CPT

## 2019-01-15 PROCEDURE — 94799 UNLISTED PULMONARY SVC/PX: CPT

## 2019-01-15 PROCEDURE — 25010000002 HEPARIN (PORCINE) PER 1000 UNITS: Performed by: INTERNAL MEDICINE

## 2019-01-15 PROCEDURE — 97116 GAIT TRAINING THERAPY: CPT

## 2019-01-15 PROCEDURE — 97530 THERAPEUTIC ACTIVITIES: CPT

## 2019-01-15 PROCEDURE — 82306 VITAMIN D 25 HYDROXY: CPT | Performed by: HOSPITALIST

## 2019-01-15 PROCEDURE — 99239 HOSP IP/OBS DSCHRG MGMT >30: CPT | Performed by: PHYSICIAN ASSISTANT

## 2019-01-15 RX ORDER — ATORVASTATIN CALCIUM 40 MG/1
40 TABLET, FILM COATED ORAL NIGHTLY
Qty: 30 TABLET | Refills: 0 | Status: SHIPPED | OUTPATIENT
Start: 2019-01-15 | End: 2019-02-14

## 2019-01-15 RX ORDER — CALCIUM CARBONATE 500(1250)
1000 TABLET ORAL DAILY
Qty: 60 TABLET | Refills: 0 | Status: SHIPPED | OUTPATIENT
Start: 2019-01-15 | End: 2019-02-14

## 2019-01-15 RX ORDER — LEVOTHYROXINE SODIUM 112 UG/1
112 TABLET ORAL DAILY
Qty: 30 TABLET | Refills: 0 | Status: SHIPPED | OUTPATIENT
Start: 2019-01-15 | End: 2019-02-14

## 2019-01-15 RX ORDER — HYDROCODONE BITARTRATE AND ACETAMINOPHEN 5; 325 MG/1; MG/1
1 TABLET ORAL EVERY 6 HOURS PRN
Qty: 12 TABLET | Refills: 0 | Status: SHIPPED | OUTPATIENT
Start: 2019-01-15 | End: 2019-01-18

## 2019-01-15 RX ORDER — CALCIUM CARBONATE 500(1250)
1000 TABLET ORAL DAILY
Status: DISCONTINUED | OUTPATIENT
Start: 2019-01-15 | End: 2019-01-15 | Stop reason: HOSPADM

## 2019-01-15 RX ADMIN — SODIUM CHLORIDE, PRESERVATIVE FREE 3 ML: 5 INJECTION INTRAVENOUS at 08:23

## 2019-01-15 RX ADMIN — LEVOTHYROXINE SODIUM 112 MCG: 75 TABLET ORAL at 06:08

## 2019-01-15 RX ADMIN — HYDROCODONE BITARTRATE AND ACETAMINOPHEN 1 TABLET: 5; 325 TABLET ORAL at 02:48

## 2019-01-15 RX ADMIN — ASPIRIN 81 MG: 81 TABLET ORAL at 08:23

## 2019-01-15 RX ADMIN — HEPARIN SODIUM 5000 UNITS: 5000 INJECTION INTRAVENOUS; SUBCUTANEOUS at 08:23

## 2019-01-15 RX ADMIN — LISINOPRIL 20 MG: 10 TABLET ORAL at 08:23

## 2019-01-16 ENCOUNTER — READMISSION MANAGEMENT (OUTPATIENT)
Dept: CALL CENTER | Facility: HOSPITAL | Age: 79
End: 2019-01-16

## 2019-01-16 NOTE — OUTREACH NOTE
Prep Survey      Responses   Facility patient discharged from?  [facility]   Is patient eligible?  Yes   Discharge diagnosis  Acute, severe neck pain followed by acute severe hip and left leg pain, Osteoarthritis, mild acute hyponatremia, Vit D. Def. , moderate to severe left internal carotid stenosis, enlarged thyroid/goiter, hypothyroidism, essential HTN, hx. of right carotid endarterectomy, hx. of TIA after CEA, DM II, obesity, dyslipidemia, osteopenia, s/p LP 01/12/19   Does the patient have one of the following disease processes/diagnoses(primary or secondary)?  Other   Does the patient have Home health ordered?  No   Is there a DME ordered?  Yes   What DME was ordered?  [facility].   Comments regarding appointments  See AVS   Prep survey completed?  Yes          Prerna Wang RN

## 2019-01-17 ENCOUNTER — READMISSION MANAGEMENT (OUTPATIENT)
Dept: CALL CENTER | Facility: HOSPITAL | Age: 79
End: 2019-01-17

## 2019-01-17 NOTE — OUTREACH NOTE
Medical Week 1 Survey      Responses   Facility patient discharged from?  [facility]   Does the patient have one of the following disease processes/diagnoses(primary or secondary)?  Other   Is there a successful TCM telephone encounter documented?  No   Week 1 attempt successful?  No   Unsuccessful attempts  Attempt 1          Keturah Andrade RN

## 2019-01-18 ENCOUNTER — READMISSION MANAGEMENT (OUTPATIENT)
Dept: CALL CENTER | Facility: HOSPITAL | Age: 79
End: 2019-01-18

## 2019-01-18 NOTE — OUTREACH NOTE
Medical Week 1 Survey      Responses   Facility patient discharged from?  [facility]   Does the patient have one of the following disease processes/diagnoses(primary or secondary)?  Other   Is there a successful TCM telephone encounter documented?  No   Week 1 attempt successful?  Yes   Call start time  0906   Call end time  0909   Discharge diagnosis  Acute, severe neck pain followed by acute severe hip and left leg pain, Osteoarthritis, mild acute hyponatremia, Vit D. Def. , moderate to severe left internal carotid stenosis, enlarged thyroid/goiter, hypothyroidism, essential HTN, hx. of right carotid endarterectomy, hx. of TIA after CEA, DM II, obesity, dyslipidemia, osteopenia, s/p LP 01/12/19   Meds reviewed with patient/caregiver?  Yes   Is the patient having any side effects they believe may be caused by any medication additions or changes?  No   Does the patient have all medications ordered at discharge?  Yes   Is the patient taking all medications as directed (includes completed medication regime)?  Yes   Does the patient have a primary care provider?   Yes   Does the patient have an appointment with their PCP within 7 days of discharge?  Greater than 7 days   What is preventing the patient from scheduling follow up appointments within 7 days of discharge?  Earlier appointment not available   [facility]   Has all DME been delivered?  Yes   Psychosocial issues?  No   Did the patient receive a copy of their discharge instructions?  Yes   Nursing interventions  Reviewed instructions with patient   What is the patient's perception of their health status since discharge?  Improving   Is the patient/caregiver able to teach back signs and symptoms related to disease process for when to call PCP?  Yes   Week 1 call completed?  Yes          Prerna Gee RN

## 2019-01-25 ENCOUNTER — READMISSION MANAGEMENT (OUTPATIENT)
Dept: CALL CENTER | Facility: HOSPITAL | Age: 79
End: 2019-01-25

## 2019-01-25 NOTE — OUTREACH NOTE
Medical Week 2 Survey      Responses   Facility patient discharged from?  [facility]   Does the patient have one of the following disease processes/diagnoses(primary or secondary)?  Other   Week 2 attempt successful?  Yes   Call start time  0943   Call end time  0956   Meds reviewed with patient/caregiver?  Yes   Is the patient taking all medications as directed (includes completed medication regime)?  Yes   Has the patient kept scheduled appointments due by today?  Yes   Psychosocial issues?  No   What is the patient's perception of their health status since discharge?  Improving   Is the patient/caregiver able to teach back signs and symptoms related to disease process for when to call 911?  Yes   Is the patient/caregiver able to teach back the hierarchy of who to call/visit for symptoms/problems? PCP, Specialist, Home health nurse, Urgent Care, ED, 911  Yes   Week 2 Call Completed?  Yes   Revoked  No further contact(revokes)-requires comment   Graduated/Revoked comments  Pt. moving and will fup with other  Health is approving          Keturah Andrade RN

## 2019-02-21 ENCOUNTER — TELEPHONE (OUTPATIENT)
Dept: CARDIAC SURGERY | Facility: CLINIC | Age: 79
End: 2019-02-21

## 2019-02-21 NOTE — TELEPHONE ENCOUNTER
Called pt and pts POA to see if she wants to r/s from 2/6/19 with our office I had to leave a message. Also called pts pcp (reffering office) to see if she wanted to r/s she told our office to disregard the ref for pt

## 2021-10-14 ENCOUNTER — APPOINTMENT (OUTPATIENT)
Dept: MAMMOGRAPHY | Facility: HOSPITAL | Age: 81
End: 2021-10-14

## 2021-10-21 ENCOUNTER — TELEPHONE (OUTPATIENT)
Dept: MAMMOGRAPHY | Facility: HOSPITAL | Age: 81
End: 2021-10-21

## 2021-10-21 NOTE — TELEPHONE ENCOUNTER
Tried multiple times to call patient and reschedule her mammogram.  No answer and voicemail not set up.  Will keep trying

## 2021-11-04 ENCOUNTER — HOSPITAL ENCOUNTER (OUTPATIENT)
Dept: ULTRASOUND IMAGING | Facility: HOSPITAL | Age: 81
Discharge: HOME | End: 2021-11-04

## 2021-11-04 ENCOUNTER — HOSPITAL ENCOUNTER (OUTPATIENT)
Dept: MAMMOGRAPHY | Facility: HOSPITAL | Age: 81
Discharge: HOME | End: 2021-11-04

## 2021-11-04 DIAGNOSIS — N63.20 MASS OF LEFT BREAST: ICD-10-CM

## 2021-11-04 DIAGNOSIS — R92.8 ABNORMAL MAMMOGRAM OF LEFT BREAST: ICD-10-CM

## 2021-11-04 PROCEDURE — 76642 ULTRASOUND BREAST LIMITED: CPT

## 2021-11-04 PROCEDURE — G0279 TOMOSYNTHESIS, MAMMO: HCPCS

## 2021-11-04 PROCEDURE — A4648 IMPLANTABLE TISSUE MARKER: HCPCS

## 2021-11-04 PROCEDURE — 88342 IMHCHEM/IMCYTCHM 1ST ANTB: CPT | Performed by: RADIOLOGY

## 2021-11-04 PROCEDURE — 88305 TISSUE EXAM BY PATHOLOGIST: CPT | Performed by: RADIOLOGY

## 2021-11-04 PROCEDURE — 88360 TUMOR IMMUNOHISTOCHEM/MANUAL: CPT | Performed by: RADIOLOGY

## 2021-11-04 PROCEDURE — 77066 DX MAMMO INCL CAD BI: CPT | Performed by: RADIOLOGY

## 2021-11-04 PROCEDURE — 19083 BX BREAST 1ST LESION US IMAG: CPT | Performed by: RADIOLOGY

## 2021-11-04 PROCEDURE — G0279 TOMOSYNTHESIS, MAMMO: HCPCS | Performed by: RADIOLOGY

## 2021-11-04 PROCEDURE — 77065 DX MAMMO INCL CAD UNI: CPT | Performed by: RADIOLOGY

## 2021-11-04 PROCEDURE — 19084 BX BREAST ADD LESION US IMAG: CPT | Performed by: RADIOLOGY

## 2021-11-04 PROCEDURE — 77066 DX MAMMO INCL CAD BI: CPT

## 2021-11-04 PROCEDURE — 76642 ULTRASOUND BREAST LIMITED: CPT | Performed by: RADIOLOGY

## 2021-11-04 NOTE — NURSING NOTE
Patient alert and oriented. Denies discomfort. No active bleeding noted. Gauze dressings to left breast both remain CDI and ice pack applied. Patient verbalizes and demonstrates understanding of post care instructions. Assisted patient to lobby for transportation back to assisted living.

## 2021-11-08 ENCOUNTER — OFFICE VISIT (OUTPATIENT)
Dept: SURGERY | Facility: CLINIC | Age: 81
End: 2021-11-08

## 2021-11-08 VITALS
SYSTOLIC BLOOD PRESSURE: 185 MMHG | HEIGHT: 64 IN | DIASTOLIC BLOOD PRESSURE: 96 MMHG | WEIGHT: 202.6 LBS | BODY MASS INDEX: 34.59 KG/M2 | HEART RATE: 77 BPM

## 2021-11-08 DIAGNOSIS — Z17.0 MALIGNANT NEOPLASM OF OVERLAPPING SITES OF LEFT BREAST IN FEMALE, ESTROGEN RECEPTOR POSITIVE (HCC): Primary | ICD-10-CM

## 2021-11-08 DIAGNOSIS — C50.812 MALIGNANT NEOPLASM OF OVERLAPPING SITES OF LEFT BREAST IN FEMALE, ESTROGEN RECEPTOR POSITIVE (HCC): Primary | ICD-10-CM

## 2021-11-08 DIAGNOSIS — R93.89 ABNORMAL CHEST X-RAY: ICD-10-CM

## 2021-11-08 LAB
LAB AP CASE REPORT: NORMAL
LAB AP CLINICAL INFORMATION: NORMAL

## 2021-11-08 PROCEDURE — 93702 BIS XTRACELL FLUID ANALYSIS: CPT | Performed by: SURGERY

## 2021-11-08 PROCEDURE — 76882 US LMTD JT/FCL EVL NVASC XTR: CPT | Performed by: SURGERY

## 2021-11-08 PROCEDURE — 99204 OFFICE O/P NEW MOD 45 MIN: CPT | Performed by: SURGERY

## 2021-11-08 RX ORDER — LEVOTHYROXINE SODIUM 0.15 MG/1
1 TABLET ORAL DAILY
COMMUNITY
Start: 2021-09-28

## 2021-11-08 RX ORDER — VALSARTAN 160 MG/1
160 TABLET ORAL DAILY
COMMUNITY
Start: 2021-09-28

## 2021-11-08 RX ORDER — ROSUVASTATIN CALCIUM 20 MG/1
20 TABLET, COATED ORAL NIGHTLY
COMMUNITY
Start: 2021-09-28

## 2021-11-08 NOTE — PROGRESS NOTES
Subjective   Jackie Erwin is a 81 y.o. female here today for pathology review.    History of Present Illness  Ms. Erwin was seen in the office today for her new diagnosis of left breast cancer.  Patient noted a palpable abnormality about one or 2 months ago.  She underwent a diagnostic mammogram and ultrasound on 11/4/2021 which demonstrated a suspicious mass corresponding to the palpable abnormality but an additional abnormality was identified in the 12 o'clock position.  Both of these were biopsied and demonstrated two separate primaries with the one at 3:00 being highly estrogen receptor positive and the one at 12:00 essentially triple negative.  Patient denies a prior history of nipple discharge or breast biopsy.  There is no family history of breast or ovarian cancer.  The patient is postmenopausal and not on hormone replacement therapy.  Patient is being evaluated for an abnormal chest x-ray.  She has been scheduled for CT of the chest.  Of note is that the patient has a history of having had valley fever in her younger days when she lived in [place].  Apparently this is associated with chest x-ray abnormalities.  Allergies   Allergen Reactions   • Morphine Shortness Of Breath     Current Outpatient Medications   Medication Sig Dispense Refill   • aspirin 81 MG EC tablet Take 81 mg by mouth every night at bedtime.     • carvedilol (COREG) 12.5 MG tablet Take 12.5 mg by mouth 2 (Two) Times a Day With Meals.     • levothyroxine (SYNTHROID, LEVOTHROID) 150 MCG tablet Take 1 tablet by mouth Daily.     • rosuvastatin (CRESTOR) 20 MG tablet      • valsartan (DIOVAN) 160 MG tablet      • diazePAM (VALIUM) 2 MG tablet Take 2 mg by mouth Every 6 (Six) Hours As Needed for Anxiety.     • lisinopril-hydrochlorothiazide (PRINZIDE,ZESTORETIC) 20-12.5 MG per tablet Take 1 tablet by mouth Daily.     • oseltamivir (TAMIFLU) 75 MG capsule Take 1 capsule by mouth daily for 10 days 10 capsule 0     No current  "facility-administered medications for this visit.     Past Medical History:   Diagnosis Date   • Arthritis    • Carotid artery disease (HCC)     Right sided, s/p Right carotid endarterectomy 2011   • Diabetes mellitus (HCC)    • Diabetic neuropathy (HCC)    • Disease of thyroid gland    • Hyperlipidemia    • Hypertension    • TIA (transient ischemic attack) 2011     Past Surgical History:   Procedure Laterality Date   • APPENDECTOMY      6 yrs old   • CAROTID ENDARTERECTOMY     • CAROTID ENDARTERECTOMY Right 2011   • HYSTERECTOMY      age 29   • INGUINAL HERNIA REPAIR  1970       Pertinent Review of Systems:  Respiratory: no shortness of breath  Cardiovascular: no chest pain  Other pertinent:    Social history: Patient currently lives in assisted living      Objective   BP (!) 185/96 (BP Location: Left arm)   Pulse 77   Ht 162.6 cm (64\")   Wt 91.9 kg (202 lb 9.6 oz)   BMI 34.78 kg/m²   Physical Exam  Well-developed well-nourished female no acute distress  Neck:  No supraclavicular adenopathy  Lungs:  Respiratory effort normal. Auscultation: Clear, without wheezes, rhonchi, rales  Heart:  Regular rate and rhythm, without murmur, gallop, rub.  No pedal edema  Breasts: On visual inspection the breasts are symmetrical.  Examination of the right breast demonstrates no discrete mass, skin change, or axillary adenopathy.  On examination of the left breast there is bruising in the lateral 3 o'clock position and the superior 1 o'clock position related to biopsy.  The mass in the lateral left 3 o'clock position is palpable.  There are postbiopsy changes but no discrete palpable mass in the 1:00 area.  There is no axillary adenopathy.     Procedures   Ultrasound Axilla left    Indication: Left breast cancer  Description:   With use of the 12.5 MHz transducer the area of clinical concern was scanned in multiple planes.  Findings: The left axilla was scanned in its entirety.  No abnormal adenopathy is " identified.  Impression: Negative ultrasound of the left axilla  Plan: Follow-up as clinically indicated      Baseline L dex was performed and was -3.2    Results/Data:  Imaging: Mammogram reports and images, ultrasound reports and images from 11/4/21 were reviewed.  I agree with the assessment  Notes:   Lab:   Other: Pathology reports were reviewed.  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 indicating that she is obese (BMI >30). Obesity-related health conditions include the following: hypertension. Obesity is newly identified. BMI is is above average; BMI management plan is completed. We discussed portion control and increasing exercise..       No future appointments.      Please note that portions of this note were completed with a voice recognition program.

## 2021-11-10 PROBLEM — Z17.0 MALIGNANT NEOPLASM OF OVERLAPPING SITES OF LEFT BREAST IN FEMALE, ESTROGEN RECEPTOR POSITIVE (HCC): Status: ACTIVE | Noted: 2021-11-10

## 2021-11-10 PROBLEM — C50.812 MALIGNANT NEOPLASM OF OVERLAPPING SITES OF LEFT BREAST IN FEMALE, ESTROGEN RECEPTOR POSITIVE (HCC): Status: ACTIVE | Noted: 2021-11-10

## 2021-11-12 ENCOUNTER — TELEPHONE (OUTPATIENT)
Dept: SURGERY | Facility: CLINIC | Age: 81
End: 2021-11-12

## 2021-11-12 NOTE — TELEPHONE ENCOUNTER
I had called Deandra because scheduling could reach her Mother. Deandra told me that she was getting her PET on 11/12/21 per Vin.  I said okay and called her Mom and went over prep with her. Deandra came by today and said there was no one at the PET department. I don't know what the problem is because I was not involved with the scheduling of the PET. I will make sure it is on schedule for next Friday and check on it every day to make sure it's still on the schedule.

## 2021-11-19 ENCOUNTER — HOSPITAL ENCOUNTER (OUTPATIENT)
Dept: PET IMAGING | Facility: HOSPITAL | Age: 81
Discharge: HOME | End: 2021-11-19
Admitting: SURGERY

## 2021-11-19 DIAGNOSIS — Z17.0 MALIGNANT NEOPLASM OF OVERLAPPING SITES OF LEFT BREAST IN FEMALE, ESTROGEN RECEPTOR POSITIVE (HCC): ICD-10-CM

## 2021-11-19 DIAGNOSIS — C50.812 MALIGNANT NEOPLASM OF OVERLAPPING SITES OF LEFT BREAST IN FEMALE, ESTROGEN RECEPTOR POSITIVE (HCC): ICD-10-CM

## 2021-11-19 PROCEDURE — 78815 PET IMAGE W/CT SKULL-THIGH: CPT

## 2021-11-19 PROCEDURE — 0 FLUDEOXYGLUCOSE F18 SOLUTION: Performed by: SURGERY

## 2021-11-19 PROCEDURE — A9552 F18 FDG: HCPCS | Performed by: SURGERY

## 2021-11-19 PROCEDURE — 78815 PET IMAGE W/CT SKULL-THIGH: CPT | Performed by: RADIOLOGY

## 2021-11-19 RX ADMIN — FLUDEOXYGLUCOSE F18 1 DOSE: 300 INJECTION INTRAVENOUS at 12:02

## 2021-11-22 ENCOUNTER — OFFICE VISIT (OUTPATIENT)
Dept: SURGERY | Facility: CLINIC | Age: 81
End: 2021-11-22

## 2021-11-22 VITALS
HEIGHT: 64 IN | DIASTOLIC BLOOD PRESSURE: 82 MMHG | HEART RATE: 80 BPM | SYSTOLIC BLOOD PRESSURE: 166 MMHG | WEIGHT: 202.6 LBS | BODY MASS INDEX: 34.59 KG/M2

## 2021-11-22 DIAGNOSIS — Z17.0 MALIGNANT NEOPLASM OF OVERLAPPING SITES OF LEFT BREAST IN FEMALE, ESTROGEN RECEPTOR POSITIVE (HCC): Primary | ICD-10-CM

## 2021-11-22 DIAGNOSIS — C50.919 MALIGNANT NEOPLASM OF BREAST ASSOCIATED WITH MUTATION IN CHEK2 GENE (HCC): ICD-10-CM

## 2021-11-22 DIAGNOSIS — C50.812 MALIGNANT NEOPLASM OF OVERLAPPING SITES OF LEFT BREAST IN FEMALE, ESTROGEN RECEPTOR POSITIVE (HCC): Primary | ICD-10-CM

## 2021-11-22 DIAGNOSIS — Z15.09 MALIGNANT NEOPLASM OF BREAST ASSOCIATED WITH MUTATION IN CHEK2 GENE (HCC): ICD-10-CM

## 2021-11-22 DIAGNOSIS — Z15.89 MALIGNANT NEOPLASM OF BREAST ASSOCIATED WITH MUTATION IN CHEK2 GENE (HCC): ICD-10-CM

## 2021-11-22 DIAGNOSIS — Z15.02 MALIGNANT NEOPLASM OF BREAST ASSOCIATED WITH MUTATION IN CHEK2 GENE (HCC): ICD-10-CM

## 2021-11-22 LAB
LAB AP CASE REPORT: NORMAL
LAB AP CLINICAL INFORMATION: NORMAL

## 2021-11-22 PROCEDURE — 99213 OFFICE O/P EST LOW 20 MIN: CPT | Performed by: SURGERY

## 2021-11-22 NOTE — PROGRESS NOTES
Subjective   Jackie Erwin is a 81 y.o. female here today for PET follow up.    History of Present Illness  Ms. Erwin was seen in the office today for breast cancer follow-up along with her daughter to discuss the results of her PET scan which was performed on 11/19/2021.  This did not show any evidence of metastatic disease.  Only one primary tumor was reported.  No disease in the lungs were reported.  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  Family members have been tested and results are pending.  Allergies   Allergen Reactions   • Morphine Shortness Of Breath       Current Outpatient Medications   Medication Sig Dispense Refill   • aspirin 81 MG EC tablet Take 81 mg by mouth every night at bedtime.     • carvedilol (COREG) 12.5 MG tablet Take 12.5 mg by mouth 2 (Two) Times a Day With Meals.     • diazePAM (VALIUM) 2 MG tablet Take 2 mg by mouth Every 6 (Six) Hours As Needed for Anxiety.     • levothyroxine (SYNTHROID, LEVOTHROID) 150 MCG tablet Take 1 tablet by mouth Daily.     • lisinopril-hydrochlorothiazide (PRINZIDE,ZESTORETIC) 20-12.5 MG per tablet Take 1 tablet by mouth Daily.     • oseltamivir (TAMIFLU) 75 MG capsule Take 1 capsule by mouth daily for 10 days 10 capsule 0   • rosuvastatin (CRESTOR) 20 MG tablet      • valsartan (DIOVAN) 160 MG tablet        No current facility-administered medications for this visit.     Past Medical History:   Diagnosis Date   • Arthritis    • Arthritis    • Carotid artery disease (HCC)     Right sided, s/p Right carotid endarterectomy 2011   • Diabetes mellitus (HCC)    • Diabetic neuropathy (HCC)    • Disease of thyroid gland    • Hyperlipidemia    • Hypertension    • TIA (transient ischemic attack) 2011     Past Surgical History:   Procedure Laterality Date   • APPENDECTOMY      6 yrs old   • CAROTID ENDARTERECTOMY     • CAROTID ENDARTERECTOMY Right 2011   • HYSTERECTOMY      age 29   •  "INGUINAL HERNIA REPAIR  1970       Pertinent Review of Systems      Objective   /82 (BP Location: Left arm)   Pulse 80   Ht 162.6 cm (64\")   Wt 91.9 kg (202 lb 9.6 oz)   BMI 34.78 kg/m²    Physical Exam  Well-developed well-nourished female no acute distress  Neck:  No supraclavicular adenopathy  Lungs:  Respiratory effort normal. Auscultation: Clear, without wheezes, rhonchi, rales  Heart:  Regular rate and rhythm, without murmur, gallop, rub.  No pedal edema  Breasts: On visual inspection the breasts are symmetrical.  Examination of the right breast demonstrates no discrete mass, skin change, or axillary adenopathy.  On examination of the left breast there is bruising in the lateral 3 o'clock position and the superior 1 o'clock position related to biopsy.  The mass in the lateral left 3 o'clock position is palpable.  There are postbiopsy changes but no discrete palpable mass in the 1:00 area.  There is no axillary adenopathy.     Procedures     Results/Data:  Imaging: PET CT reports and images were reviewed.  I do feel that both primary tumors are seen on the PET scan where is the initial report only listed 1.  I do not see any metastatic disease.  I see some scarring in the lung bases but no suspicious lesions in the lung.  On 11/23/2021 I reviewed the PET CT results with Dr. Jasso.  He agreed that there were 2 primary lesions in the breast and that there was no evidence of acute lung disease.  He agreed that the findings on PET CT were consistent with interstitial fibrosis.      Assessment/Plan     Clinical T2N0 ductal carcinoma at 12:00, ER low positive, DE negative, HER-2 negative  Clinical T2N0 ductal carcinoma at 3:00, ER positive, DE positive, HER-2 equivocal   Chek 2 mutation    Plan: Proceed with left mastectomy with sentinel node biopsy, possible left axillary node dissection    Discussion/Summary: Given the patient's Chek 2 mutation the question in this case is whether the patient would " benefit from bilateral mastectomy.  Patient does have an enlarged heart, she is 81 years old and states that she would rather not have the contralateral breast removed unless she has to.  I think this is reasonable given her age and the fact that her first diagnosis is at 81 years of age.    Time spent:     Patient's Body mass index is 34.78 kg/m². indicating that she is overweight (BMI 25-29.9). Obesity-related health conditions include the following: hypertension. Obesity is improving with lifestyle modifications. BMI is is above average; BMI management plan is completed. We discussed portion control and increasing exercise..         No future appointments.      Please note that portions of this note were completed with a voice recognition program.

## 2021-11-23 PROBLEM — Z15.89 MALIGNANT NEOPLASM OF BREAST ASSOCIATED WITH MUTATION IN CHEK2 GENE (HCC): Status: ACTIVE | Noted: 2021-11-23

## 2021-11-23 PROBLEM — Z15.02 MALIGNANT NEOPLASM OF BREAST ASSOCIATED WITH MUTATION IN CHEK2 GENE (HCC): Status: ACTIVE | Noted: 2021-11-23

## 2021-11-23 PROBLEM — Z15.09 MALIGNANT NEOPLASM OF BREAST ASSOCIATED WITH MUTATION IN CHEK2 GENE: Status: ACTIVE | Noted: 2021-11-23

## 2021-11-23 PROBLEM — C50.919 MALIGNANT NEOPLASM OF BREAST ASSOCIATED WITH MUTATION IN CHEK2 GENE (HCC): Status: ACTIVE | Noted: 2021-11-23

## 2021-11-23 RX ORDER — CEFAZOLIN SODIUM 2 G/50ML
2 SOLUTION INTRAVENOUS ONCE
Status: CANCELLED | OUTPATIENT
Start: 2021-11-23 | End: 2021-11-23

## 2021-11-23 NOTE — H&P
Subjective   Jackie Erwin is a 81 y.o. female here today for PET follow up.    History of Present Illness  Ms. Erwin was seen in the office today for breast cancer follow-up along with her daughter to discuss the results of her PET scan which was performed on 11/19/2021.  This did not show any evidence of metastatic disease.  Only one primary tumor was reported.  No disease in the lungs were reported.  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  Family members have been tested and results are pending.  Allergies   Allergen Reactions   • Morphine Shortness Of Breath       Current Outpatient Medications   Medication Sig Dispense Refill   • aspirin 81 MG EC tablet Take 81 mg by mouth every night at bedtime.     • carvedilol (COREG) 12.5 MG tablet Take 12.5 mg by mouth 2 (Two) Times a Day With Meals.     • diazePAM (VALIUM) 2 MG tablet Take 2 mg by mouth Every 6 (Six) Hours As Needed for Anxiety.     • levothyroxine (SYNTHROID, LEVOTHROID) 150 MCG tablet Take 1 tablet by mouth Daily.     • lisinopril-hydrochlorothiazide (PRINZIDE,ZESTORETIC) 20-12.5 MG per tablet Take 1 tablet by mouth Daily.     • oseltamivir (TAMIFLU) 75 MG capsule Take 1 capsule by mouth daily for 10 days 10 capsule 0   • rosuvastatin (CRESTOR) 20 MG tablet      • valsartan (DIOVAN) 160 MG tablet        No current facility-administered medications for this visit.     Past Medical History:   Diagnosis Date   • Arthritis    • Arthritis    • Carotid artery disease (HCC)     Right sided, s/p Right carotid endarterectomy 2011   • Diabetes mellitus (HCC)    • Diabetic neuropathy (HCC)    • Disease of thyroid gland    • Hyperlipidemia    • Hypertension    • TIA (transient ischemic attack) 2011     Past Surgical History:   Procedure Laterality Date   • APPENDECTOMY      6 yrs old   • CAROTID ENDARTERECTOMY     • CAROTID ENDARTERECTOMY Right 2011   • HYSTERECTOMY      age 29   •  "INGUINAL HERNIA REPAIR  1970       Pertinent Review of Systems      Objective   /82 (BP Location: Left arm)   Pulse 80   Ht 162.6 cm (64\")   Wt 91.9 kg (202 lb 9.6 oz)   BMI 34.78 kg/m²    Physical Exam  Well-developed well-nourished female no acute distress  Neck:  No supraclavicular adenopathy  Lungs:  Respiratory effort normal. Auscultation: Clear, without wheezes, rhonchi, rales  Heart:  Regular rate and rhythm, without murmur, gallop, rub.  No pedal edema  Breasts: On visual inspection the breasts are symmetrical.  Examination of the right breast demonstrates no discrete mass, skin change, or axillary adenopathy.  On examination of the left breast there is bruising in the lateral 3 o'clock position and the superior 1 o'clock position related to biopsy.  The mass in the lateral left 3 o'clock position is palpable.  There are postbiopsy changes but no discrete palpable mass in the 1:00 area.  There is no axillary adenopathy.     Procedures     Results/Data:  Imaging: PET CT reports and images were reviewed.  I do feel that both primary tumors are seen on the PET scan where is the initial report only listed 1.  I do not see any metastatic disease.  I see some scarring in the lung bases but no suspicious lesions in the lung.  On 11/23/2021 I reviewed the PET CT results with Dr. Jasso.  He agreed that there were 2 primary lesions in the breast and that there was no evidence of acute lung disease.  He agreed that the findings on PET CT were consistent with interstitial fibrosis.      Assessment/Plan     Clinical T2N0 ductal carcinoma at 12:00, ER low positive, MA negative, HER-2 negative  Clinical T2N0 ductal carcinoma at 3:00, ER positive, MA positive, HER-2 equivocal   Chek 2 mutation    Plan: Proceed with left mastectomy with sentinel node biopsy, possible left axillary node dissection    Discussion/Summary: Given the patient's Chek 2 mutation the question in this case is whether the patient would " benefit from bilateral mastectomy.  Patient does have an enlarged heart, she is 81 years old and states that she would rather not have the contralateral breast removed unless she has to.  I think this is reasonable given her age and the fact that her first diagnosis is at 81 years of age.    Time spent:     Patient's Body mass index is 34.78 kg/m². indicating that she is overweight (BMI 25-29.9). Obesity-related health conditions include the following: hypertension. Obesity is improving with lifestyle modifications. BMI is is above average; BMI management plan is completed. We discussed portion control and increasing exercise..         No future appointments.      Please note that portions of this note were completed with a voice recognition program.    This document has been electronically signed by Kaylen FITZGERALD MD on November 23, 2021 15:27 EST

## 2021-12-07 DIAGNOSIS — Z01.818 PRE-OP TESTING: Primary | ICD-10-CM

## 2021-12-07 DIAGNOSIS — C50.812 MALIGNANT NEOPLASM OF OVERLAPPING SITES OF LEFT BREAST IN FEMALE, ESTROGEN RECEPTOR POSITIVE (HCC): Primary | ICD-10-CM

## 2021-12-07 DIAGNOSIS — Z17.0 MALIGNANT NEOPLASM OF OVERLAPPING SITES OF LEFT BREAST IN FEMALE, ESTROGEN RECEPTOR POSITIVE (HCC): Primary | ICD-10-CM

## 2021-12-13 ENCOUNTER — PRE-ADMISSION TESTING (OUTPATIENT)
Dept: PREADMISSION TESTING | Facility: HOSPITAL | Age: 81
End: 2021-12-13

## 2021-12-13 ENCOUNTER — LAB (OUTPATIENT)
Dept: LAB | Facility: HOSPITAL | Age: 81
End: 2021-12-13

## 2021-12-13 DIAGNOSIS — Z01.818 PRE-OP TESTING: ICD-10-CM

## 2021-12-13 LAB
CANCER AG15-3 SERPL-ACNC: 18.1 U/ML
QT INTERVAL: 416 MS
QTC INTERVAL: 442 MS
SARS-COV-2 RNA PNL SPEC NAA+PROBE: NOT DETECTED

## 2021-12-13 PROCEDURE — 85025 COMPLETE CBC W/AUTO DIFF WBC: CPT | Performed by: SURGERY

## 2021-12-13 PROCEDURE — C9803 HOPD COVID-19 SPEC COLLECT: HCPCS

## 2021-12-13 PROCEDURE — 86300 IMMUNOASSAY TUMOR CA 15-3: CPT | Performed by: SURGERY

## 2021-12-13 PROCEDURE — 80053 COMPREHEN METABOLIC PANEL: CPT | Performed by: SURGERY

## 2021-12-13 PROCEDURE — U0004 COV-19 TEST NON-CDC HGH THRU: HCPCS

## 2021-12-13 PROCEDURE — 93010 ELECTROCARDIOGRAM REPORT: CPT | Performed by: INTERNAL MEDICINE

## 2021-12-13 PROCEDURE — 93005 ELECTROCARDIOGRAM TRACING: CPT

## 2021-12-13 RX ORDER — DIPHENHYDRAMINE HCL, IBUPROFEN 25; 200 MG/1; MG/1
1 CAPSULE, LIQUID FILLED ORAL NIGHTLY
COMMUNITY

## 2021-12-13 RX ORDER — MULTIPLE VITAMINS W/ MINERALS TAB 9MG-400MCG
1 TAB ORAL DAILY
COMMUNITY

## 2021-12-13 RX ORDER — ECHINACEA PURPUREA EXTRACT 125 MG
1 TABLET ORAL AS NEEDED
COMMUNITY

## 2021-12-13 NOTE — DISCHARGE INSTRUCTIONS

## 2021-12-14 ENCOUNTER — TELEPHONE (OUTPATIENT)
Dept: SURGERY | Facility: CLINIC | Age: 81
End: 2021-12-14

## 2021-12-14 LAB — CANCER AG27-29 SERPL-ACNC: 23 U/ML (ref 0–38.6)

## 2021-12-15 ENCOUNTER — ANESTHESIA EVENT (OUTPATIENT)
Dept: PERIOP | Facility: HOSPITAL | Age: 81
End: 2021-12-15

## 2021-12-15 ENCOUNTER — HOSPITAL ENCOUNTER (OUTPATIENT)
Dept: NUCLEAR MEDICINE | Facility: HOSPITAL | Age: 81
Discharge: HOME | End: 2021-12-15

## 2021-12-15 ENCOUNTER — ANESTHESIA (OUTPATIENT)
Dept: PERIOP | Facility: HOSPITAL | Age: 81
End: 2021-12-15

## 2021-12-15 ENCOUNTER — HOSPITAL ENCOUNTER (OUTPATIENT)
Facility: HOSPITAL | Age: 81
Discharge: HOME | End: 2021-12-16
Attending: SURGERY | Admitting: SURGERY

## 2021-12-15 DIAGNOSIS — Z15.89 MALIGNANT NEOPLASM OF BREAST ASSOCIATED WITH MUTATION IN CHEK2 GENE (HCC): Primary | ICD-10-CM

## 2021-12-15 DIAGNOSIS — C50.812 MALIGNANT NEOPLASM OF OVERLAPPING SITES OF LEFT BREAST IN FEMALE, ESTROGEN RECEPTOR POSITIVE (HCC): ICD-10-CM

## 2021-12-15 DIAGNOSIS — Z17.0 MALIGNANT NEOPLASM OF OVERLAPPING SITES OF LEFT BREAST IN FEMALE, ESTROGEN RECEPTOR POSITIVE (HCC): ICD-10-CM

## 2021-12-15 DIAGNOSIS — Z15.02 MALIGNANT NEOPLASM OF BREAST ASSOCIATED WITH MUTATION IN CHEK2 GENE (HCC): Primary | ICD-10-CM

## 2021-12-15 DIAGNOSIS — Z15.09 MALIGNANT NEOPLASM OF BREAST ASSOCIATED WITH MUTATION IN CHEK2 GENE (HCC): Primary | ICD-10-CM

## 2021-12-15 DIAGNOSIS — C50.919 MALIGNANT NEOPLASM OF BREAST ASSOCIATED WITH MUTATION IN CHEK2 GENE (HCC): Primary | ICD-10-CM

## 2021-12-15 PROCEDURE — 38525 BIOPSY/REMOVAL LYMPH NODES: CPT | Performed by: SURGERY

## 2021-12-15 PROCEDURE — A9270 NON-COVERED ITEM OR SERVICE: HCPCS | Performed by: SURGERY

## 2021-12-15 PROCEDURE — 25010000002 FENTANYL CITRATE (PF) 50 MCG/ML SOLUTION: Performed by: NURSE ANESTHETIST, CERTIFIED REGISTERED

## 2021-12-15 PROCEDURE — 63710000001 DOCUSATE SODIUM 100 MG CAPSULE: Performed by: SURGERY

## 2021-12-15 PROCEDURE — 88332 PATH CONSLTJ SURG EA ADD BLK: CPT | Performed by: SURGERY

## 2021-12-15 PROCEDURE — A9541 TC99M SULFUR COLLOID: HCPCS | Performed by: SURGERY

## 2021-12-15 PROCEDURE — 19301 PARTIAL MASTECTOMY: CPT | Performed by: SURGERY

## 2021-12-15 PROCEDURE — 0 CEFAZOLIN SODIUM-DEXTROSE 2-3 GM-%(50ML) RECONSTITUTED SOLUTION: Performed by: SURGERY

## 2021-12-15 PROCEDURE — 25010000002 PROPOFOL 10 MG/ML EMULSION: Performed by: NURSE ANESTHETIST, CERTIFIED REGISTERED

## 2021-12-15 PROCEDURE — 88331 PATH CONSLTJ SURG 1 BLK 1SPC: CPT | Performed by: SURGERY

## 2021-12-15 PROCEDURE — 63710000001 ACETAMINOPHEN 500 MG TABLET: Performed by: SURGERY

## 2021-12-15 PROCEDURE — 88305 TISSUE EXAM BY PATHOLOGIST: CPT | Performed by: SURGERY

## 2021-12-15 PROCEDURE — 63710000001 ROSUVASTATIN 20 MG TABLET: Performed by: SURGERY

## 2021-12-15 PROCEDURE — 63710000001 VALSARTAN 160 MG TABLET: Performed by: SURGERY

## 2021-12-15 PROCEDURE — 63710000001 POLYETHYLENE GLYCOL 17 G PACK: Performed by: SURGERY

## 2021-12-15 PROCEDURE — 0 TECHNETIUM FILTERED SULFUR COLLOID: Performed by: SURGERY

## 2021-12-15 PROCEDURE — 38900 IO MAP OF SENT LYMPH NODE: CPT | Performed by: SURGERY

## 2021-12-15 PROCEDURE — G0378 HOSPITAL OBSERVATION PER HR: HCPCS

## 2021-12-15 PROCEDURE — 88307 TISSUE EXAM BY PATHOLOGIST: CPT | Performed by: SURGERY

## 2021-12-15 PROCEDURE — 25010000002 KETOROLAC TROMETHAMINE PER 15 MG: Performed by: SURGERY

## 2021-12-15 PROCEDURE — 25010000002 ONDANSETRON PER 1 MG: Performed by: NURSE ANESTHETIST, CERTIFIED REGISTERED

## 2021-12-15 PROCEDURE — 63710000001 DIPHENHYDRAMINE PER 50 MG: Performed by: SURGERY

## 2021-12-15 PROCEDURE — 63710000001 OXYCODONE 5 MG TABLET: Performed by: SURGERY

## 2021-12-15 PROCEDURE — C1889 IMPLANT/INSERT DEVICE, NOC: HCPCS | Performed by: SURGERY

## 2021-12-15 PROCEDURE — 25010000002 ROPIVACAINE PER 1 MG: Performed by: ANESTHESIOLOGY

## 2021-12-15 PROCEDURE — 25010000002 DEXAMETHASONE PER 1 MG: Performed by: ANESTHESIOLOGY

## 2021-12-15 PROCEDURE — 38792 RA TRACER ID OF SENTINL NODE: CPT

## 2021-12-15 DEVICE — LIGACLIP EXTRA LIGATING CLIP CARTRIDGES: 6 TITANIUM CLIPS/ CARTRIDGE (SMALL)
Type: IMPLANTABLE DEVICE | Site: BREAST | Status: FUNCTIONAL
Brand: LIGACLIP

## 2021-12-15 DEVICE — LIGACLIP EXTRA LIGATING CLIP CARTRIDGES: 6 TITANIUM CLIPS/ CARTRIDGE (MEDIUM)
Type: IMPLANTABLE DEVICE | Site: BREAST | Status: FUNCTIONAL
Brand: LIGACLIP

## 2021-12-15 RX ORDER — ECHINACEA PURPUREA EXTRACT 125 MG
1 TABLET ORAL AS NEEDED
Status: DISCONTINUED | OUTPATIENT
Start: 2021-12-15 | End: 2021-12-16 | Stop reason: HOSPADM

## 2021-12-15 RX ORDER — KETOROLAC TROMETHAMINE 30 MG/ML
15 INJECTION, SOLUTION INTRAMUSCULAR; INTRAVENOUS EVERY 6 HOURS
Status: DISCONTINUED | OUTPATIENT
Start: 2021-12-15 | End: 2021-12-16 | Stop reason: HOSPADM

## 2021-12-15 RX ORDER — POLYETHYLENE GLYCOL 3350 17 G/17G
17 POWDER, FOR SOLUTION ORAL 2 TIMES DAILY
Status: DISCONTINUED | OUTPATIENT
Start: 2021-12-15 | End: 2021-12-16 | Stop reason: HOSPADM

## 2021-12-15 RX ORDER — MEPERIDINE HYDROCHLORIDE 25 MG/ML
12.5 INJECTION INTRAMUSCULAR; INTRAVENOUS; SUBCUTANEOUS
Status: DISCONTINUED | OUTPATIENT
Start: 2021-12-15 | End: 2021-12-15 | Stop reason: HOSPADM

## 2021-12-15 RX ORDER — PROPOFOL 10 MG/ML
VIAL (ML) INTRAVENOUS AS NEEDED
Status: DISCONTINUED | OUTPATIENT
Start: 2021-12-15 | End: 2021-12-15 | Stop reason: SURG

## 2021-12-15 RX ORDER — ROPIVACAINE HYDROCHLORIDE 5 MG/ML
INJECTION, SOLUTION EPIDURAL; INFILTRATION; PERINEURAL
Status: COMPLETED | OUTPATIENT
Start: 2021-12-15 | End: 2021-12-15

## 2021-12-15 RX ORDER — DEXAMETHASONE SODIUM PHOSPHATE 4 MG/ML
INJECTION, SOLUTION INTRA-ARTICULAR; INTRALESIONAL; INTRAMUSCULAR; INTRAVENOUS; SOFT TISSUE
Status: COMPLETED | OUTPATIENT
Start: 2021-12-15 | End: 2021-12-15

## 2021-12-15 RX ORDER — FENTANYL CITRATE 50 UG/ML
INJECTION, SOLUTION INTRAMUSCULAR; INTRAVENOUS AS NEEDED
Status: DISCONTINUED | OUTPATIENT
Start: 2021-12-15 | End: 2021-12-15 | Stop reason: SURG

## 2021-12-15 RX ORDER — MAGNESIUM HYDROXIDE 1200 MG/15ML
LIQUID ORAL AS NEEDED
Status: DISCONTINUED | OUTPATIENT
Start: 2021-12-15 | End: 2021-12-15 | Stop reason: HOSPADM

## 2021-12-15 RX ORDER — DOCUSATE SODIUM 100 MG/1
100 CAPSULE, LIQUID FILLED ORAL 2 TIMES DAILY
Status: DISCONTINUED | OUTPATIENT
Start: 2021-12-15 | End: 2021-12-16 | Stop reason: HOSPADM

## 2021-12-15 RX ORDER — ONDANSETRON 2 MG/ML
4 INJECTION INTRAMUSCULAR; INTRAVENOUS AS NEEDED
Status: DISCONTINUED | OUTPATIENT
Start: 2021-12-15 | End: 2021-12-15 | Stop reason: HOSPADM

## 2021-12-15 RX ORDER — SODIUM CHLORIDE, SODIUM LACTATE, POTASSIUM CHLORIDE, CALCIUM CHLORIDE 600; 310; 30; 20 MG/100ML; MG/100ML; MG/100ML; MG/100ML
100 INJECTION, SOLUTION INTRAVENOUS CONTINUOUS
Status: DISCONTINUED | OUTPATIENT
Start: 2021-12-15 | End: 2021-12-16 | Stop reason: HOSPADM

## 2021-12-15 RX ORDER — DROPERIDOL 2.5 MG/ML
0.62 INJECTION, SOLUTION INTRAMUSCULAR; INTRAVENOUS ONCE AS NEEDED
Status: DISCONTINUED | OUTPATIENT
Start: 2021-12-15 | End: 2021-12-15 | Stop reason: HOSPADM

## 2021-12-15 RX ORDER — FAMOTIDINE 20 MG/1
20 TABLET, FILM COATED ORAL
Status: DISCONTINUED | OUTPATIENT
Start: 2021-12-15 | End: 2021-12-16 | Stop reason: HOSPADM

## 2021-12-15 RX ORDER — IBUPROFEN 200 MG
200 TABLET ORAL NIGHTLY
Status: DISCONTINUED | OUTPATIENT
Start: 2021-12-15 | End: 2021-12-16 | Stop reason: HOSPADM

## 2021-12-15 RX ORDER — ASPIRIN 81 MG/1
81 TABLET ORAL NIGHTLY
Status: CANCELLED | OUTPATIENT
Start: 2021-12-15

## 2021-12-15 RX ORDER — SODIUM CHLORIDE, SODIUM LACTATE, POTASSIUM CHLORIDE, CALCIUM CHLORIDE 600; 310; 30; 20 MG/100ML; MG/100ML; MG/100ML; MG/100ML
100 INJECTION, SOLUTION INTRAVENOUS ONCE AS NEEDED
Status: DISCONTINUED | OUTPATIENT
Start: 2021-12-15 | End: 2021-12-15 | Stop reason: HOSPADM

## 2021-12-15 RX ORDER — MIDAZOLAM HYDROCHLORIDE 1 MG/ML
0.5 INJECTION INTRAMUSCULAR; INTRAVENOUS
Status: DISCONTINUED | OUTPATIENT
Start: 2021-12-15 | End: 2021-12-15 | Stop reason: HOSPADM

## 2021-12-15 RX ORDER — LIDOCAINE HYDROCHLORIDE 20 MG/ML
INJECTION, SOLUTION INFILTRATION; PERINEURAL AS NEEDED
Status: DISCONTINUED | OUTPATIENT
Start: 2021-12-15 | End: 2021-12-15 | Stop reason: SURG

## 2021-12-15 RX ORDER — ROSUVASTATIN CALCIUM 20 MG/1
20 TABLET, COATED ORAL NIGHTLY
Status: DISCONTINUED | OUTPATIENT
Start: 2021-12-15 | End: 2021-12-16 | Stop reason: HOSPADM

## 2021-12-15 RX ORDER — CEFAZOLIN SODIUM 2 G/50ML
2 SOLUTION INTRAVENOUS ONCE
Status: COMPLETED | OUTPATIENT
Start: 2021-12-15 | End: 2021-12-15

## 2021-12-15 RX ORDER — ACETAMINOPHEN 500 MG
1000 TABLET ORAL EVERY 6 HOURS
Status: DISCONTINUED | OUTPATIENT
Start: 2021-12-15 | End: 2021-12-16 | Stop reason: HOSPADM

## 2021-12-15 RX ORDER — CEFAZOLIN SODIUM 2 G/50ML
2 SOLUTION INTRAVENOUS EVERY 8 HOURS
Status: COMPLETED | OUTPATIENT
Start: 2021-12-15 | End: 2021-12-16

## 2021-12-15 RX ORDER — SODIUM CHLORIDE 0.9 % (FLUSH) 0.9 %
10 SYRINGE (ML) INJECTION AS NEEDED
Status: DISCONTINUED | OUTPATIENT
Start: 2021-12-15 | End: 2021-12-15 | Stop reason: HOSPADM

## 2021-12-15 RX ORDER — CARBOXYMETHYLCELLULOSE SODIUM 5 MG/ML
1 SOLUTION/ DROPS OPHTHALMIC
Status: DISCONTINUED | OUTPATIENT
Start: 2021-12-15 | End: 2021-12-16 | Stop reason: HOSPADM

## 2021-12-15 RX ORDER — FAMOTIDINE 10 MG/ML
INJECTION, SOLUTION INTRAVENOUS AS NEEDED
Status: DISCONTINUED | OUTPATIENT
Start: 2021-12-15 | End: 2021-12-15 | Stop reason: SURG

## 2021-12-15 RX ORDER — OXYCODONE HYDROCHLORIDE 5 MG/1
5 TABLET ORAL EVERY 4 HOURS PRN
Status: DISCONTINUED | OUTPATIENT
Start: 2021-12-15 | End: 2021-12-16 | Stop reason: HOSPADM

## 2021-12-15 RX ORDER — MULTIPLE VITAMINS W/ MINERALS TAB 9MG-400MCG
1 TAB ORAL DAILY
Status: DISCONTINUED | OUTPATIENT
Start: 2021-12-15 | End: 2021-12-16 | Stop reason: HOSPADM

## 2021-12-15 RX ORDER — VALSARTAN 160 MG/1
160 TABLET ORAL DAILY
Status: DISCONTINUED | OUTPATIENT
Start: 2021-12-15 | End: 2021-12-16 | Stop reason: HOSPADM

## 2021-12-15 RX ORDER — SODIUM CHLORIDE, SODIUM LACTATE, POTASSIUM CHLORIDE, CALCIUM CHLORIDE 600; 310; 30; 20 MG/100ML; MG/100ML; MG/100ML; MG/100ML
125 INJECTION, SOLUTION INTRAVENOUS ONCE
Status: COMPLETED | OUTPATIENT
Start: 2021-12-15 | End: 2021-12-15

## 2021-12-15 RX ORDER — HEPARIN SODIUM 5000 [USP'U]/ML
5000 INJECTION, SOLUTION INTRAVENOUS; SUBCUTANEOUS EVERY 12 HOURS SCHEDULED
Status: DISCONTINUED | OUTPATIENT
Start: 2021-12-16 | End: 2021-12-16 | Stop reason: HOSPADM

## 2021-12-15 RX ORDER — ONDANSETRON 2 MG/ML
INJECTION INTRAMUSCULAR; INTRAVENOUS AS NEEDED
Status: DISCONTINUED | OUTPATIENT
Start: 2021-12-15 | End: 2021-12-15 | Stop reason: SURG

## 2021-12-15 RX ORDER — FENTANYL CITRATE 50 UG/ML
50 INJECTION, SOLUTION INTRAMUSCULAR; INTRAVENOUS
Status: DISCONTINUED | OUTPATIENT
Start: 2021-12-15 | End: 2021-12-15 | Stop reason: HOSPADM

## 2021-12-15 RX ORDER — SODIUM CHLORIDE 0.9 % (FLUSH) 0.9 %
10 SYRINGE (ML) INJECTION EVERY 12 HOURS SCHEDULED
Status: DISCONTINUED | OUTPATIENT
Start: 2021-12-15 | End: 2021-12-15 | Stop reason: HOSPADM

## 2021-12-15 RX ORDER — ISOSULFAN BLUE 50 MG/5ML
INJECTION, SOLUTION SUBCUTANEOUS AS NEEDED
Status: DISCONTINUED | OUTPATIENT
Start: 2021-12-15 | End: 2021-12-15 | Stop reason: HOSPADM

## 2021-12-15 RX ORDER — SODIUM CHLORIDE 0.9 % (FLUSH) 0.9 %
3 SYRINGE (ML) INJECTION EVERY 12 HOURS SCHEDULED
Status: DISCONTINUED | OUTPATIENT
Start: 2021-12-15 | End: 2021-12-16 | Stop reason: HOSPADM

## 2021-12-15 RX ORDER — IPRATROPIUM BROMIDE AND ALBUTEROL SULFATE 2.5; .5 MG/3ML; MG/3ML
3 SOLUTION RESPIRATORY (INHALATION) ONCE AS NEEDED
Status: DISCONTINUED | OUTPATIENT
Start: 2021-12-15 | End: 2021-12-15 | Stop reason: HOSPADM

## 2021-12-15 RX ORDER — SODIUM CHLORIDE 0.9 % (FLUSH) 0.9 %
10 SYRINGE (ML) INJECTION AS NEEDED
Status: DISCONTINUED | OUTPATIENT
Start: 2021-12-15 | End: 2021-12-16 | Stop reason: HOSPADM

## 2021-12-15 RX ORDER — SODIUM CHLORIDE, SODIUM LACTATE, POTASSIUM CHLORIDE, CALCIUM CHLORIDE 600; 310; 30; 20 MG/100ML; MG/100ML; MG/100ML; MG/100ML
INJECTION, SOLUTION INTRAVENOUS CONTINUOUS PRN
Status: DISCONTINUED | OUTPATIENT
Start: 2021-12-15 | End: 2021-12-15 | Stop reason: SURG

## 2021-12-15 RX ORDER — DIPHENHYDRAMINE HCL 25 MG
25 CAPSULE ORAL NIGHTLY
Status: DISCONTINUED | OUTPATIENT
Start: 2021-12-15 | End: 2021-12-16 | Stop reason: HOSPADM

## 2021-12-15 RX ORDER — ONDANSETRON 2 MG/ML
4 INJECTION INTRAMUSCULAR; INTRAVENOUS EVERY 4 HOURS PRN
Status: DISCONTINUED | OUTPATIENT
Start: 2021-12-15 | End: 2021-12-16 | Stop reason: HOSPADM

## 2021-12-15 RX ORDER — LEVOTHYROXINE SODIUM 0.15 MG/1
150 TABLET ORAL DAILY
Status: DISCONTINUED | OUTPATIENT
Start: 2021-12-15 | End: 2021-12-16 | Stop reason: HOSPADM

## 2021-12-15 RX ADMIN — ACETAMINOPHEN 1000 MG: 500 TABLET ORAL at 20:29

## 2021-12-15 RX ADMIN — FENTANYL CITRATE 50 MCG: 50 INJECTION INTRAMUSCULAR; INTRAVENOUS at 07:38

## 2021-12-15 RX ADMIN — ROPIVACAINE HYDROCHLORIDE 150 MG: 5 INJECTION, SOLUTION EPIDURAL; INFILTRATION; PERINEURAL at 07:40

## 2021-12-15 RX ADMIN — FAMOTIDINE 20 MG: 10 INJECTION INTRAVENOUS at 07:32

## 2021-12-15 RX ADMIN — FENTANYL CITRATE 50 MCG: 50 INJECTION INTRAMUSCULAR; INTRAVENOUS at 08:48

## 2021-12-15 RX ADMIN — DOCUSATE SODIUM 100 MG: 100 CAPSULE ORAL at 21:54

## 2021-12-15 RX ADMIN — DOCUSATE SODIUM 100 MG: 100 CAPSULE ORAL at 15:27

## 2021-12-15 RX ADMIN — VALSARTAN 160 MG: 160 TABLET, FILM COATED ORAL at 21:40

## 2021-12-15 RX ADMIN — PROPOFOL 100 MG: 10 INJECTION, EMULSION INTRAVENOUS at 07:43

## 2021-12-15 RX ADMIN — FENTANYL CITRATE 50 MCG: 50 INJECTION INTRAMUSCULAR; INTRAVENOUS at 10:44

## 2021-12-15 RX ADMIN — OXYCODONE HYDROCHLORIDE 5 MG: 5 TABLET ORAL at 11:31

## 2021-12-15 RX ADMIN — EPHEDRINE SULFATE 10 MG: 50 INJECTION, SOLUTION INTRAVENOUS at 08:09

## 2021-12-15 RX ADMIN — FENTANYL CITRATE 50 MCG: 50 INJECTION INTRAMUSCULAR; INTRAVENOUS at 07:59

## 2021-12-15 RX ADMIN — ACETAMINOPHEN 1000 MG: 500 TABLET ORAL at 15:27

## 2021-12-15 RX ADMIN — DIPHENHYDRAMINE HCL 25 MG: 25 CAPSULE ORAL at 21:39

## 2021-12-15 RX ADMIN — KETOROLAC TROMETHAMINE 15 MG: 30 INJECTION, SOLUTION INTRAMUSCULAR at 21:40

## 2021-12-15 RX ADMIN — FENTANYL CITRATE 50 MCG: 50 INJECTION INTRAMUSCULAR; INTRAVENOUS at 08:30

## 2021-12-15 RX ADMIN — SODIUM CHLORIDE, POTASSIUM CHLORIDE, SODIUM LACTATE AND CALCIUM CHLORIDE: 600; 310; 30; 20 INJECTION, SOLUTION INTRAVENOUS at 07:32

## 2021-12-15 RX ADMIN — DEXAMETHASONE SODIUM PHOSPHATE 4 MG: 4 INJECTION, SOLUTION INTRA-ARTICULAR; INTRALESIONAL; INTRAMUSCULAR; INTRAVENOUS; SOFT TISSUE at 07:40

## 2021-12-15 RX ADMIN — PROPOFOL 150 MG: 10 INJECTION, EMULSION INTRAVENOUS at 07:38

## 2021-12-15 RX ADMIN — ONDANSETRON 4 MG: 2 INJECTION INTRAMUSCULAR; INTRAVENOUS at 10:00

## 2021-12-15 RX ADMIN — TECHNETIUM TC 99M SULFUR COLLOID 1 DOSE: KIT at 07:35

## 2021-12-15 RX ADMIN — LIDOCAINE HYDROCHLORIDE 40 MG: 20 INJECTION, SOLUTION INFILTRATION; PERINEURAL at 07:38

## 2021-12-15 RX ADMIN — CEFAZOLIN SODIUM 2 G: 2 SOLUTION INTRAVENOUS at 18:28

## 2021-12-15 RX ADMIN — KETOROLAC TROMETHAMINE 15 MG: 30 INJECTION, SOLUTION INTRAMUSCULAR at 15:26

## 2021-12-15 RX ADMIN — ROSUVASTATIN CALCIUM 20 MG: 20 TABLET, FILM COATED ORAL at 21:45

## 2021-12-15 RX ADMIN — POLYETHYLENE GLYCOL (3350) 17 G: 17 POWDER, FOR SOLUTION ORAL at 21:54

## 2021-12-15 RX ADMIN — SODIUM CHLORIDE, POTASSIUM CHLORIDE, SODIUM LACTATE AND CALCIUM CHLORIDE 125 ML/HR: 600; 310; 30; 20 INJECTION, SOLUTION INTRAVENOUS at 07:29

## 2021-12-15 RX ADMIN — CEFAZOLIN SODIUM 2 G: 2 SOLUTION INTRAVENOUS at 07:42

## 2021-12-15 NOTE — ANESTHESIA PREPROCEDURE EVALUATION
Anesthesia Evaluation     Patient summary reviewed and Nursing notes reviewed   history of anesthetic complications (hx spinal headache):  NPO Solid Status: > 8 hours  NPO Liquid Status: > 8 hours           Airway   Mallampati: II  TM distance: >3 FB  Neck ROM: full  No difficulty expected  Dental    (+) poor dentition    Pulmonary - normal exam    breath sounds clear to auscultation  (+) a smoker Former,   Cardiovascular - normal exam    ECG reviewed  Rhythm: regular  Rate: normal    (+) hypertension, hyperlipidemia,  carotid artery disease (s/p Rt CEA) right carotid      Neuro/Psych  (+) TIA, headaches,     GI/Hepatic/Renal/Endo    (+) obesity,  GERD,  diabetes mellitus type 2,     Musculoskeletal     (+) gait problem,   Abdominal   (+) obese,    Substance History - negative use     OB/GYN negative ob/gyn ROS         Other   arthritis,    history of cancer active    ROS/Med Hx Other: Echo 8/2018:  ·Left ventricular systolic function is normal. Estimated EF appears to be in the range of 66 - 70%  ·All left ventricular wall segments contract normally.  ·Left ventricular diastolic dysfunction.  ·Left atrial cavity size is mildly dilated.  ·The aortic valve is structurally normal. No aortic valve regurgitation is present. No aortic valve stenosis is present  ·Mild mitral valve regurgitation is present  ·Mild tricuspid valve regurgitation is present.  ·Mild pulmonary hypertension is present.  ·There is no evidence of pericardial effusion    Stress Test 8/2018:  ·Patient experienced no chest pain during the test and ECG showed no evidence of ischemia.  ·Myocardial perfusion imaging indicates a normal myocardial perfusion study with no evidence of ischemia.  ·Left ventricular ejection fraction is normal (Calculated EF = 68%).  ·Normal LV cavity size. Normal LV wall motion noted.  ·Impressions are consistent with a low risk study.                         Anesthesia Plan    ASA 3     general with block and general   (PEC  blocks for post op pain control per surgeon request. )  intravenous induction     Anesthetic plan, all risks, benefits, and alternatives have been provided, discussed and informed consent has been obtained with: patient.  Use of blood products discussed with patient  Consented to blood products.   Plan discussed with CRNA.

## 2021-12-15 NOTE — ANESTHESIA PROCEDURE NOTES
Peripheral Block    Pre-sedation assessment completed: 12/15/2021 7:39 AM    Patient reassessed immediately prior to procedure    Patient location during procedure: OR  Start time: 12/15/2021 7:39 AM  Stop time: 12/15/2021 7:41 AM  Reason for block: at surgeon's request and post-op pain management  Performed by  CRNA: Alban Ingram CRNA  Preanesthetic Checklist  Completed: patient identified, IV checked, site marked, risks and benefits discussed, surgical consent, monitors and equipment checked, pre-op evaluation and timeout performed  Prep:  Pt Position: supine  Sterile barriers:cap, gloves, gown and mask  Prep: ChloraPrep  Patient monitoring: blood pressure monitoring, continuous pulse oximetry and EKG  Procedure    Sedation: yes (General Anesthesia)    Guidance:ultrasound guided and landmark technique  Images:still images obtained    Block Type:PECS I and PECS II (PECS)  Injection Technique:single-shot  Needle Type:short-bevel  Needle Gauge:20 G  Resistance on Injection: none    Medications Used: dexamethasone (DECADRON) injection, 4 mg  ropivacaine (NAROPIN) injection 0.5 %, 150 mg  Med administered at 12/15/2021 7:40 AM      Medications  Comment:Block Injection: 20ml of LA mix injected at each PECS 2 site, 10ml of LA mix injected at each PECS 1 site          Post Assessment  Injection Assessment: negative aspiration for heme, incremental injection and no paresthesia on injection  Patient Tolerance:comfortable throughout block  Complications:no  Additional Notes  The pt. was placed in the Supine Position and was placed under GA.     The insertion site was prepped with CHG and Ultrasound guidance with In-Plane technique was  a 4inch BBraun 360 degree echogenic needle was visualized.  Normal Saline PSF was utilized for hydrodissection of tissue. PECS 2-  Pectoralis Minor and Serratus muscle where identified and the needle was advanced laterally in-plane with the 4th rib as a backstop, pleura was monitored.   LA was injected between SA and PmM at the level of 4th rib( 20ml). PECS 1 Block- Pectoralis Major and Minor where identified and LA was injected between PMM and PmM at the level of the 3rd Rib(10ml)  LA injection spread was visualized, injection was incremental 1-5ml, normal or low injection pressure, no intravascular injection, no pneumothorax appreciated. Thank You.

## 2021-12-15 NOTE — ANESTHESIA POSTPROCEDURE EVALUATION
Patient: Jackie Erwin    Procedure Summary     Date: 12/15/21 Room / Location: [facility] OR  /  [facility] OR    Anesthesia Start: 0732 Anesthesia Stop: 1018    Procedure: BREAST MASTECTOMY WITH SENTINEL NODE BIOPSY (Left Breast) Diagnosis:       Malignant neoplasm of overlapping sites of left breast in female, estrogen receptor positive (HCC)      (Malignant neoplasm of overlapping sites of left breast in female, estrogen receptor positive (HCC) [C50.812, Z17.0])    Surgeons: Kaylen Saunders MD Provider: Sukhjinder Bedoya DO    Anesthesia Type: general with block, general ASA Status: 3          Anesthesia Type: general with block, general    Vitals  Vitals Value Taken Time   /88 12/15/21 1050   Temp 97.2 °F (36.2 °C) 12/15/21 1020   Pulse 68 12/15/21 1050   Resp 17 12/15/21 1050   SpO2 97 % 12/15/21 1050           Post Anesthesia Care and Evaluation    Patient location during evaluation: PACU  Patient participation: complete - patient participated  Level of consciousness: sleepy but conscious  Pain score: 1  Pain management: adequate  Airway patency: patent  Anesthetic complications: No anesthetic complications  PONV Status: controlled  Cardiovascular status: acceptable  Respiratory status: acceptable and nasal cannula  Hydration status: acceptable  No anesthesia care post op

## 2021-12-15 NOTE — ANESTHESIA PROCEDURE NOTES
Airway  Urgency: elective    Date/Time: 12/15/2021 7:40 AM    General Information and Staff    Patient location during procedure: OR    Indications and Patient Condition    Preoxygenated: yes  Mask difficulty assessment: 0 - not attempted    Final Airway Details  Final airway type: supraglottic airway      Successful airway: unique  Size 4    Number of attempts at approach: 1  Assessment: lips, teeth, and gum same as pre-op

## 2021-12-15 NOTE — OP NOTE
Pre-op: Left breast carcinoma x2 primaries    Post-op:  Same    Procedure:  Isotope and lymphazurin injection  [place] node biopsy with frozen section  Left mastectomy    Surgeon:  Kaylen Saunders M.D., F.A.CPinaS.    Assistant; Blake    Anesthesia:  general with block    Indications: Left breast carcinoma x x2 primaries  Chek 2 mutation         Procedure Details   After obtaining informed consent and with venous compression boots in place and receiving preoperative antibiotics the patient was taken to the operating room and placed under anesthesia.  In the retroareolar location approximately 500 µCi of filtered technetium labeled sulfur colloid were injected and the breast was massaged.  5 mL of Lymphazurin blue were then injected into the subcutaneous areolar lymphatic plexus and the breast was again massaged.  The left breast and axilla were prepped and draped in a sterile fashion.    An elliptical incision was made in the breast.  The superior lateral aspect of the incision was utilized to perform the sentinel node.  With use of the visual of the blue dye, the Neoprobe, and palpation, sentinel lymph nodes were identified and sent to pathology for frozen section evaluation. All sentinel nodes were deep axillary lymph nodes.  Hemostasis was obtained with the cautery and with hemoclips.  Following identification of the sentinel lymph nodes counts were obtained of the nodes and the tumor bed.  After obtaining hemostasis the mastectomy was performed. Flaps were raised superiorly toward the clavicle, medially towards the sternum and inferiorly toward the inframammary fold.  Once the borders of dissection were delineated the breast was freed off the pectoralis fascia taking care to excise the pectoralis fascia.  The breast was freed off the axillary tissue, removed, and orientation stitch was placed.  KARINA drain was placed under the breast flap and the breast was closed in a 2 layer closure of interrupted 3-0 Vicryl  subdermal sutures followed by 4-0 Monocryl sutures.   Steri-Strips, dressing, Surgi-Bra were placed.  Patient tolerated the procedure well and was taken to the recovery room in stable condition    Findings:  Tumor bed [id number]  Triplett node #1, count of 2426, negative for malignancy  Triplett node #2, count of 680, small probable focus of metastatic carcinoma  Triplett node #3, count of 50, negative for malignancy  Triplett node #4, count of 206, negative for malignancy    Curative intent:  yes  Tracers used in non-neoadjuvant setting          lymphazurin dye: Yes          radioactive tracer:  yes  Tracers used in the neoadjuvant setting          lymphazurin dye:  N/A          radioactive tracer:  N/A  All nodes present at the end of a dye filled channel removed: Yes  All significantly radioactive nodes were removed:   Yes  All palpable suspicious nodes removed:  Yes  Biopsy proven + nodes marked with clips prior to chemo removed:  N/A    Estimated Blood Loss:  Minimal    Blood administered:  none           Drains: 10 flat KARINA           Specimens:   ID Type Source Tests Collected by Time   A : LEFT #1 Tissue Triplett Lymph Node SURGICAL PATHOLOGY EXAM Kaylen Saunders MD 12/15/2021 0814   B : LEFT #2 Tissue Triplett Lymph Node SURGICAL PATHOLOGY EXAM Kaylen Saunders MD 12/15/2021 0820   C : LEFT #3 Tissue Triplett Lymph Node SURGICAL PATHOLOGY EXAM Kaylen Saunders MD 12/15/2021 0829   D : LEFT #4 Tissue Triplett Lymph Node SURGICAL PATHOLOGY EXAM Kaylen Saunders MD 12/15/2021 0907   E : Left breast (Suture at 12 o'clock) Tissue Breast, Left TISSUE PATHOLOGY EXAM Kaylen Saunders MD 12/15/2021 0912   F : left axillary contents Tissue Axilla, Left TISSUE PATHOLOGY EXAM Kaylen Saunders MD 12/15/2021 0942        Grafts and Implants: None        Complications:  none           Disposition: PACU - hemodynamically stable.           Condition: stable

## 2021-12-16 VITALS
OXYGEN SATURATION: 95 % | DIASTOLIC BLOOD PRESSURE: 81 MMHG | SYSTOLIC BLOOD PRESSURE: 173 MMHG | HEIGHT: 64 IN | WEIGHT: 203.8 LBS | RESPIRATION RATE: 18 BRPM | TEMPERATURE: 98 F | BODY MASS INDEX: 34.79 KG/M2 | HEART RATE: 68 BPM

## 2021-12-16 PROCEDURE — G0378 HOSPITAL OBSERVATION PER HR: HCPCS

## 2021-12-16 PROCEDURE — A9270 NON-COVERED ITEM OR SERVICE: HCPCS | Performed by: SURGERY

## 2021-12-16 PROCEDURE — 25010000002 HEPARIN (PORCINE) PER 1000 UNITS: Performed by: SURGERY

## 2021-12-16 PROCEDURE — 63710000001 LEVOTHYROXINE 150 MCG TABLET: Performed by: SURGERY

## 2021-12-16 PROCEDURE — 63710000001 ACETAMINOPHEN 500 MG TABLET: Performed by: SURGERY

## 2021-12-16 PROCEDURE — 94799 UNLISTED PULMONARY SVC/PX: CPT

## 2021-12-16 PROCEDURE — 0 CEFAZOLIN SODIUM-DEXTROSE 2-3 GM-%(50ML) RECONSTITUTED SOLUTION: Performed by: SURGERY

## 2021-12-16 PROCEDURE — 63710000001 FAMOTIDINE 20 MG TABLET: Performed by: SURGERY

## 2021-12-16 PROCEDURE — 63710000001 MULTIVITAMIN WITH MINERALS TABLET: Performed by: SURGERY

## 2021-12-16 PROCEDURE — 63710000001 DOCUSATE SODIUM 100 MG CAPSULE: Performed by: SURGERY

## 2021-12-16 PROCEDURE — 99024 POSTOP FOLLOW-UP VISIT: CPT | Performed by: SURGERY

## 2021-12-16 PROCEDURE — 25010000002 KETOROLAC TROMETHAMINE PER 15 MG: Performed by: SURGERY

## 2021-12-16 PROCEDURE — 63710000001 POLYETHYLENE GLYCOL 17 G PACK: Performed by: SURGERY

## 2021-12-16 RX ORDER — HYDROCODONE BITARTRATE AND ACETAMINOPHEN 7.5; 325 MG/1; MG/1
1 TABLET ORAL 4 TIMES DAILY PRN
Qty: 15 TABLET | Refills: 0 | Status: SHIPPED | OUTPATIENT
Start: 2021-12-16

## 2021-12-16 RX ORDER — POLYETHYLENE GLYCOL 3350 17 G/17G
17 POWDER, FOR SOLUTION ORAL 2 TIMES DAILY
Qty: 10 PACKET | Refills: 0 | Status: SHIPPED | OUTPATIENT
Start: 2021-12-16 | End: 2021-12-21

## 2021-12-16 RX ADMIN — LEVOTHYROXINE SODIUM 150 MCG: 0.15 TABLET ORAL at 05:59

## 2021-12-16 RX ADMIN — KETOROLAC TROMETHAMINE 15 MG: 30 INJECTION, SOLUTION INTRAMUSCULAR at 02:18

## 2021-12-16 RX ADMIN — SODIUM CHLORIDE, POTASSIUM CHLORIDE, SODIUM LACTATE AND CALCIUM CHLORIDE 100 ML/HR: 600; 310; 30; 20 INJECTION, SOLUTION INTRAVENOUS at 01:28

## 2021-12-16 RX ADMIN — ACETAMINOPHEN 1000 MG: 500 TABLET ORAL at 02:18

## 2021-12-16 RX ADMIN — Medication 1 TABLET: at 08:15

## 2021-12-16 RX ADMIN — SODIUM CHLORIDE, PRESERVATIVE FREE 3 ML: 5 INJECTION INTRAVENOUS at 08:15

## 2021-12-16 RX ADMIN — POLYETHYLENE GLYCOL (3350) 17 G: 17 POWDER, FOR SOLUTION ORAL at 08:15

## 2021-12-16 RX ADMIN — ACETAMINOPHEN 1000 MG: 500 TABLET ORAL at 08:15

## 2021-12-16 RX ADMIN — CEFAZOLIN SODIUM 2 G: 2 SOLUTION INTRAVENOUS at 01:27

## 2021-12-16 RX ADMIN — FAMOTIDINE 20 MG: 20 TABLET, FILM COATED ORAL at 08:15

## 2021-12-16 RX ADMIN — HEPARIN SODIUM 5000 UNITS: 5000 INJECTION INTRAVENOUS; SUBCUTANEOUS at 13:24

## 2021-12-16 RX ADMIN — DOCUSATE SODIUM 100 MG: 100 CAPSULE ORAL at 08:15

## 2021-12-16 RX ADMIN — KETOROLAC TROMETHAMINE 15 MG: 30 INJECTION, SOLUTION INTRAMUSCULAR at 13:24

## 2021-12-16 NOTE — PLAN OF CARE
Goal Outcome Evaluation:  Plan of Care Reviewed With: patient        Progress: improving   VSS; ZELALEM dressing to left chest area cl, dry and intact.  KARINA drain to bulb suction.  Assisting pt with ambulating to bathroom. Pt voiding well. States pain/discomfort eased with prescribed pain regimen. Pt alert and oriented. Very pleasant and cooperative with care and instructions.

## 2021-12-16 NOTE — DISCHARGE SUMMARY
[facility] GENERAL SURGERY DISCHARGE SUMMARY < 48 HOURS    Patient Identification:  Name:  Jackie Erwin  Age:  81 y.o.  Sex:  female  :  1940  MRN:  [id number]    Date of Admission: 12/15/2021  Date of Discharge:  2021     ADMISSION DIAGNOSIS: Left breast cancer    DISCHARGE DIAGNOSIS:  Same    PROCEDURES PERFORMED:  Procedure(s):  BREAST MASTECTOMY WITH SENTINEL NODE BIOPSY       HOSPITAL COURSE  Patient is a 81 y.o. female admitted to [facility] for postop care after surgery.  Please see the admitting history and physical for further details.  Patient did well.  She is voiding and tolerating a diet.  On examination the surgical dressing is intact and KARINA drain is draining serosanguineous fluid.    CONDITION AT DISCHARGE:  Improved    DISCHARGE DISPOSITION   Home    DISCHARGE MEDICATIONS:     Discharge Medications      New Medications      Instructions Start Date   HYDROcodone-acetaminophen 7.5-325 MG per tablet  Commonly known as: Norco   1 tablet, Oral, 4 Times Daily PRN      polyethylene glycol 17 g packet  Commonly known as: MIRALAX   17 g, Oral, 2 Times Daily         Continue These Medications      Instructions Start Date   Advil -25 MG capsule  Generic drug: Ibuprofen-diphenhydrAMINE HCl   1 capsule, Oral, Nightly      aspirin 81 MG EC tablet   81 mg, Oral, Every Night at Bedtime      levothyroxine 150 MCG tablet  Commonly known as: SYNTHROID, LEVOTHROID   1 tablet, Oral, Daily      multivitamin with minerals tablet tablet   1 tablet, Oral, Daily      polyethyl glycol-propyl glycol 0.4-0.3 % solution ophthalmic solution (artificial tears)  Commonly known as: SYSTANE   1 drop, Every 3 Hours PRN      rosuvastatin 20 MG tablet  Commonly known as: CRESTOR   20 mg, Oral, Nightly      sodium chloride 0.65 % nasal spray   1 spray, Nasal, As Needed      valsartan 160 MG tablet  Commonly known as: DIOVAN   160 mg, Oral, Daily             FOLLOW-UP:  Dr. Saunders in 5 to 7  days    Activity and diet instructions given to the patient

## 2021-12-16 NOTE — PLAN OF CARE
Goal Outcome Evaluation:                 Problem: Adult Inpatient Plan of Care  Goal: Plan of Care Review  Outcome: Ongoing, Progressing  Goal: Patient-Specific Goal (Individualized)  Outcome: Ongoing, Progressing  Goal: Absence of Hospital-Acquired Illness or Injury  Outcome: Ongoing, Progressing  Intervention: Identify and Manage Fall Risk  Recent Flowsheet Documentation  Taken 12/15/2021 1729 by Leandra Benton RN  Safety Promotion/Fall Prevention: safety round/check completed  Taken 12/15/2021 1700 by Leandra Benton RN  Safety Promotion/Fall Prevention: safety round/check completed  Taken 12/15/2021 1315 by Leandra Benton RN  Safety Promotion/Fall Prevention:   lighting adjusted   nonskid shoes/slippers when out of bed   room organization consistent   safety round/check completed   activity supervised   clutter free environment maintained  Intervention: Prevent and Manage VTE (venous thromboembolism) Risk  Recent Flowsheet Documentation  Taken 12/15/2021 1315 by Leandra Benton RN  VTE Prevention/Management:   bilateral   sequential compression devices on  Intervention: Prevent Infection  Recent Flowsheet Documentation  Taken 12/15/2021 1315 by Leandra Benton RN  Infection Prevention:   visitors restricted/screened   single patient room provided   rest/sleep promoted   hand hygiene promoted   personal protective equipment utilized   equipment surfaces disinfected  Goal: Optimal Comfort and Wellbeing  Outcome: Ongoing, Progressing  Intervention: Provide Person-Centered Care  Recent Flowsheet Documentation  Taken 12/15/2021 1315 by Leandra Benton RN  Trust Relationship/Rapport:   care explained   choices provided   emotional support provided   questions answered   questions encouraged   reassurance provided   thoughts/feelings acknowledged   empathic listening provided  Goal: Readiness for Transition of Care  Outcome: Ongoing, Progressing

## 2021-12-16 NOTE — PLAN OF CARE
Problem: Adult Inpatient Plan of Care  Goal: Plan of Care Review  Outcome: Met  Flowsheets  Taken 12/16/2021 1512 by Danita Encinas RN  Outcome Summary: patient is doing well, pain is well controlled, Patient is being discharged home able to care for self. states understanding of drain care.  Taken 12/16/2021 0329 by Zenaida Marques RN  Progress: improving  Plan of Care Reviewed With: patient  Goal: Patient-Specific Goal (Individualized)  Outcome: Met  Goal: Absence of Hospital-Acquired Illness or Injury  Outcome: Met  Intervention: Identify and Manage Fall Risk  Recent Flowsheet Documentation  Taken 12/16/2021 1500 by Danita Encinas RN  Safety Promotion/Fall Prevention: safety round/check completed  Taken 12/16/2021 1400 by Danita Encinas RN  Safety Promotion/Fall Prevention: safety round/check completed  Taken 12/16/2021 1324 by Danita Encinas RN  Safety Promotion/Fall Prevention: safety round/check completed  Taken 12/16/2021 1300 by Danita Encinas RN  Safety Promotion/Fall Prevention: safety round/check completed  Taken 12/16/2021 0827 by Danita Encinas RN  Safety Promotion/Fall Prevention: safety round/check completed  Taken 12/16/2021 0826 by Danita Encinas RN  Safety Promotion/Fall Prevention: safety round/check completed  Goal: Optimal Comfort and Wellbeing  Outcome: Met  Goal: Readiness for Transition of Care  Outcome: Met   Goal Outcome Evaluation:              Outcome Summary: patient is doing well, pain is well controlled, Patient is being discharged home able to care for self. states understanding of drain care.

## 2021-12-20 ENCOUNTER — OFFICE VISIT (OUTPATIENT)
Dept: SURGERY | Facility: CLINIC | Age: 81
End: 2021-12-20

## 2021-12-20 VITALS
HEIGHT: 64 IN | WEIGHT: 203 LBS | HEART RATE: 83 BPM | BODY MASS INDEX: 34.66 KG/M2 | DIASTOLIC BLOOD PRESSURE: 69 MMHG | SYSTOLIC BLOOD PRESSURE: 144 MMHG

## 2021-12-20 DIAGNOSIS — Z09 POSTOP CHECK: ICD-10-CM

## 2021-12-20 DIAGNOSIS — Z17.0 MALIGNANT NEOPLASM OF OVERLAPPING SITES OF LEFT BREAST IN FEMALE, ESTROGEN RECEPTOR POSITIVE (HCC): Primary | ICD-10-CM

## 2021-12-20 DIAGNOSIS — C50.919 MALIGNANT NEOPLASM OF BREAST ASSOCIATED WITH MUTATION IN CHEK2 GENE (HCC): ICD-10-CM

## 2021-12-20 DIAGNOSIS — Z15.89 MALIGNANT NEOPLASM OF BREAST ASSOCIATED WITH MUTATION IN CHEK2 GENE (HCC): ICD-10-CM

## 2021-12-20 DIAGNOSIS — C50.812 MALIGNANT NEOPLASM OF OVERLAPPING SITES OF LEFT BREAST IN FEMALE, ESTROGEN RECEPTOR POSITIVE (HCC): Primary | ICD-10-CM

## 2021-12-20 DIAGNOSIS — Z15.02 MALIGNANT NEOPLASM OF BREAST ASSOCIATED WITH MUTATION IN CHEK2 GENE (HCC): ICD-10-CM

## 2021-12-20 DIAGNOSIS — Z15.09 MALIGNANT NEOPLASM OF BREAST ASSOCIATED WITH MUTATION IN CHEK2 GENE (HCC): ICD-10-CM

## 2021-12-20 PROCEDURE — 99024 POSTOP FOLLOW-UP VISIT: CPT | Performed by: SURGERY

## 2021-12-20 NOTE — PROGRESS NOTES
"Subjective   Jackie Erwin is a 81 y.o. female here today for post op.    History of Present Illness  Ms. Erwin was seen in the office today for the first postoperative visit following a left mastectomy with sentinel node biopsy on 12/15/2020.  Final pathology is pending.  Patient states she is doing well.  Allergies   Allergen Reactions   • Morphine Shortness Of Breath         Current Outpatient Medications   Medication Sig Dispense Refill   • aspirin 81 MG EC tablet Take 81 mg by mouth every night at bedtime.     • HYDROcodone-acetaminophen (Norco) 7.5-325 MG per tablet Take 1 tablet by mouth 4 (Four) Times a Day As Needed for Moderate Pain . 15 tablet 0   • Ibuprofen-diphenhydrAMINE HCl (Advil PM) 200-25 MG capsule Take 1 capsule by mouth Every Night.     • levothyroxine (SYNTHROID, LEVOTHROID) 150 MCG tablet Take 1 tablet by mouth Daily.     • multivitamin with minerals (OCUVITE EYE + MULTI PO) Take 1 tablet by mouth Daily.     • polyethyl glycol-propyl glycol (SYSTANE) 0.4-0.3 % solution ophthalmic solution (artificial tears) 1 drop Every 3 (Three) Hours As Needed.     • polyethylene glycol (MIRALAX) 17 g packet Mix 17 grams of powder in 4 to 8 ounces of liquid and take by mouth 2 (Two) Times a Day for 5 days. 10 packet 0   • rosuvastatin (CRESTOR) 20 MG tablet Take 20 mg by mouth Every Night.     • sodium chloride 0.65 % nasal spray 1 spray into the nostril(s) as directed by provider As Needed for Congestion.     • valsartan (DIOVAN) 160 MG tablet Take 160 mg by mouth Daily.       No current facility-administered medications for this visit.       Objective   Ht 162.6 cm (64\")   Wt 92.1 kg (203 lb)   LMP  (LMP Unknown)   BMI 34.84 kg/m²    Physical Exam  Left chest: Healing surgical scar.  KARINA drain draining serosanguineous fluid approximately 60 cc/day.  KARINA drain was left in place  Results/Data  Pathology result is pending    Procedures     Assessment/Plan   Status post left mastectomy with sentinel node " biopsy for 2 separate primaries and left breast  Pathogenic CHEK2 mutation    Plan: Track pathology  Follow-up in 1 week       Discussion/Summary  Patient's Body mass index is 34.84 kg/m². indicating that she is obese (BMI >30). Obesity-related health conditions include the following: hypertension. Obesity is improving with lifestyle modifications. BMI is is above average; BMI management plan is completed. We discussed portion control and increasing exercise..       Future Appointments   Date Time Provider Department Center   12/20/2021 10:05 AM Kaylen Saunders MD [facility]   1/12/2022  1:00 PM Radha Mills MD [facility]         Please note that portions of this note were completed with a voice recognition program.

## 2021-12-21 LAB
LAB AP CASE REPORT: NORMAL
LAB AP CASE REPORT: NORMAL
Lab: NORMAL
PATH REPORT.FINAL DX SPEC: NORMAL
PATH REPORT.FINAL DX SPEC: NORMAL
PATH REPORT.GROSS SPEC: NORMAL

## 2021-12-22 ENCOUNTER — TELEPHONE (OUTPATIENT)
Dept: SURGERY | Facility: CLINIC | Age: 81
End: 2021-12-22

## 2021-12-22 NOTE — TELEPHONE ENCOUNTER
Spoke to Mr. Lorenzo about Jackie's BRCA testing. We got a letter that [employer] denied paying for it. He told me that [employer] for some policies want certain genes omitted. This means we could not order the panels that we do. He said the most this paient will get a bill for is $100.00. He is going to look into something for her and try to get it down to even less.

## 2021-12-27 ENCOUNTER — OFFICE VISIT (OUTPATIENT)
Dept: SURGERY | Facility: CLINIC | Age: 81
End: 2021-12-27

## 2021-12-27 VITALS
DIASTOLIC BLOOD PRESSURE: 83 MMHG | HEIGHT: 64 IN | WEIGHT: 200 LBS | HEART RATE: 78 BPM | BODY MASS INDEX: 34.15 KG/M2 | SYSTOLIC BLOOD PRESSURE: 135 MMHG | TEMPERATURE: 96.6 F

## 2021-12-27 DIAGNOSIS — C50.812 MALIGNANT NEOPLASM OF OVERLAPPING SITES OF LEFT BREAST IN FEMALE, ESTROGEN RECEPTOR POSITIVE (HCC): Primary | ICD-10-CM

## 2021-12-27 DIAGNOSIS — Z17.0 MALIGNANT NEOPLASM OF OVERLAPPING SITES OF LEFT BREAST IN FEMALE, ESTROGEN RECEPTOR POSITIVE (HCC): Primary | ICD-10-CM

## 2021-12-27 DIAGNOSIS — Z09 POSTOP CHECK: ICD-10-CM

## 2021-12-27 PROCEDURE — 99024 POSTOP FOLLOW-UP VISIT: CPT | Performed by: SURGERY

## 2022-01-03 ENCOUNTER — TELEPHONE (OUTPATIENT)
Dept: SURGERY | Facility: CLINIC | Age: 82
End: 2022-01-03

## 2022-01-03 NOTE — TELEPHONE ENCOUNTER
Miss Liriano called me this morning about her Mother being seen in the [place] ER. I called Jackie and ask if she wanted to be seen sooner her in the [place] office. She states she has been on two antibiotics and that the redness and her fever have gone. I have called and ask for all records to be sent so Dr. Saunders can review and we will she her in [place] on Monday. Patient was told to call if anything changed.

## 2022-01-10 ENCOUNTER — OFFICE VISIT (OUTPATIENT)
Dept: SURGERY | Facility: CLINIC | Age: 82
End: 2022-01-10

## 2022-01-10 VITALS
BODY MASS INDEX: 34.86 KG/M2 | SYSTOLIC BLOOD PRESSURE: 146 MMHG | HEART RATE: 83 BPM | WEIGHT: 204.2 LBS | HEIGHT: 64 IN | DIASTOLIC BLOOD PRESSURE: 72 MMHG

## 2022-01-10 DIAGNOSIS — Z15.02 MALIGNANT NEOPLASM OF BREAST ASSOCIATED WITH MUTATION IN CHEK2 GENE: ICD-10-CM

## 2022-01-10 DIAGNOSIS — C50.812 MALIGNANT NEOPLASM OF OVERLAPPING SITES OF LEFT BREAST IN FEMALE, ESTROGEN RECEPTOR POSITIVE: Primary | ICD-10-CM

## 2022-01-10 DIAGNOSIS — Z17.0 MALIGNANT NEOPLASM OF OVERLAPPING SITES OF LEFT BREAST IN FEMALE, ESTROGEN RECEPTOR POSITIVE: Primary | ICD-10-CM

## 2022-01-10 DIAGNOSIS — C50.919 MALIGNANT NEOPLASM OF BREAST ASSOCIATED WITH MUTATION IN CHEK2 GENE: ICD-10-CM

## 2022-01-10 DIAGNOSIS — Z09 POSTOP CHECK: ICD-10-CM

## 2022-01-10 DIAGNOSIS — Z15.89 MALIGNANT NEOPLASM OF BREAST ASSOCIATED WITH MUTATION IN CHEK2 GENE: ICD-10-CM

## 2022-01-10 DIAGNOSIS — Z15.09 MALIGNANT NEOPLASM OF BREAST ASSOCIATED WITH MUTATION IN CHEK2 GENE: ICD-10-CM

## 2022-01-10 PROCEDURE — 99024 POSTOP FOLLOW-UP VISIT: CPT | Performed by: SURGERY

## 2022-01-10 NOTE — PROGRESS NOTES
"Subjective   Jackie Erwin is a 81 y.o. female here today for follow up post op.    History of Present Illness  Ms. Erwin was seen in the office today for a postoperative visit following a left mastectomy with sentinel node biopsy on 12/15/2020.  KARINA drain was removed on 12/27/2021.  Patient was admitted to [facility] from 12/30/2021 through 12/31/2021 for fever and chills.  She had an extensive work-up.  Blood cultures were no growth.  Small left axillary seroma was aspirated and returned no growth.  Patient states she currently feels well except for she has some soreness in the left upper axilla.  Allergies   Allergen Reactions   • Morphine Shortness Of Breath         Current Outpatient Medications   Medication Sig Dispense Refill   • aspirin 81 MG EC tablet Take 81 mg by mouth every night at bedtime.     • Ibuprofen-diphenhydrAMINE HCl (Advil PM) 200-25 MG capsule Take 1 capsule by mouth Every Night.     • levothyroxine (SYNTHROID, LEVOTHROID) 150 MCG tablet Take 1 tablet by mouth Daily.     • multivitamin with minerals (OCUVITE EYE + MULTI PO) Take 1 tablet by mouth Daily.     • polyethyl glycol-propyl glycol (SYSTANE) 0.4-0.3 % solution ophthalmic solution (artificial tears) 1 drop Every 3 (Three) Hours As Needed.     • rosuvastatin (CRESTOR) 20 MG tablet Take 20 mg by mouth Every Night.     • sodium chloride 0.65 % nasal spray 1 spray into the nostril(s) as directed by provider As Needed for Congestion.     • valsartan (DIOVAN) 160 MG tablet Take 160 mg by mouth Daily.     • HYDROcodone-acetaminophen (Norco) 7.5-325 MG per tablet Take 1 tablet by mouth 4 (Four) Times a Day As Needed for Moderate Pain . 15 tablet 0     No current facility-administered medications for this visit.       Objective   /72 (BP Location: Left arm)   Pulse 83   Ht 162.6 cm (64\")   Wt 92.6 kg (204 lb 3.2 oz)   LMP  (LMP Unknown)   BMI 35.05 kg/m²    Physical Exam  Left chest: Healing mastectomy scar without erythema " or drainage.  There is some fullness in the upper left axilla which on ultrasound did not demonstrate a seroma.  Under sterile conditions a 13 cc seroma with clear fluid was aspirated without complication.  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  20 mm greatest dimension  Invasive lobular carcinoma, 3:00, ER positive, MS positive, HER-2 negative.  T2N1  Positive Chek mutation -patient did not wish contralateral mastectomy    Patient has scheduled consultation with medical oncology on 1/12/2022  Follow-up with me in 3 months       Discussion/Summary  Patient's Body mass index is 35.05 kg/m². indicating that she is overweight (BMI 25-29.9). Obesity-related health conditions include the following: none. Obesity is improving with lifestyle modifications. BMI is is above average; BMI management plan is completed. We discussed portion control and increasing exercise..       Future Appointments   Date Time Provider Department Center   1/12/2022  1:00 PM Radha Mills MD [facility]         Please note that portions of this note were completed with a voice recognition program.

## 2022-01-11 NOTE — PROGRESS NOTES
NAME: Jackie Erwin    : 1940    DATE OF CONSULTATION:  2022     REASON FOR REFERRAL: Breast Cancer    REFERRING PHYSICIAN:  Kaylen Saunders MD    CHIEF COMPLAINT:  Breast Cancer       HISTORY OF PRESENT ILLNESS:   Jackie Erwin is a very pleasant 81 y.o. female who is being seen today at the request of Kaylen Saunders MD for evaluation and treatment of newly diagnosed breast cancer.  She says she noticed a lump in her L breast and so presented for further evaluation. She saw her PCP and was referred for imaging and biopsy and then saw Dr. Saunders.  She had biopsies of 2 distincet areas in the L breast.  The first showed IDC Grade 2 which was weakly ER+ and IL/HER-2 negative.  The other was an ILC which was strongly ER/IL + and HER2 equivocal by IHC/FISH but ultimately judged to be negative.   On 12/15/21, Dr. Saunders took her for L simple mastectomy.  Final pathology also showed two separate tumors as below.   SLN showed a 6 mm metastasis without extranodal extension.      Ms. Erwin is currently healing well and denies any specific complaints. She reports having a post op fever of 102.4 and was taken to [facility] ED for evaluation. She completed 2 doses of IV antibiotics and no identifiable cause was found and she has remained afebrile. She has completed a course of both oral Keflex and oral Bactrim.  She hasn't had further fevers.  She complains of fatigue which has worsened since her surgery but has started walking again.  She lost about 5 lbs but weight has since been stable.  She has frequent chronic cough (since developing [place] F[place] in [place] in ) but this is no worse than her baseline..  She denies chest pain or shortness of breath.  She denies abdominal pain, nausea/vomiting/diarrha.  She has on/off constipation.  No rectal bleeding.  She is otherwise well and without complaint.    Risk Factors:  Age of Menarche: 12 years old  Age of Menopause: surgical at age 29 years old    Prior Breast Disease: + H/o abnl mammogram R breast when she was in her 20's or 30's, dx'd with what sounds like fibrocystic disease. No bx needed.   Pregnancies:    Age of 1st pregnancy: 18  Family History of Breast Cancer: unsure - M. Aunt had mastectomy  Family History of Ovarian Cancer: none  History of HRT use: none    PAST MEDICAL HISTORY:  Past Medical History:   Diagnosis Date   • Arthritis    • Arthritis    • Cancer (HCC)     LEFT BREAST   • Carotid artery disease (HCC)     Right sided, s/p Right carotid endarterectomy    • Diabetes mellitus (HCC)    • Diabetic neuropathy (HCC)    • Disease of thyroid gland    • Elevated cholesterol    • Hyperlipidemia    • Hypertension    • Neuropathy     LOWER LEGS AND FEET   • Spinal headache    • Stroke (HCC)    • TIA (transient ischemic attack)    • Unsteady gait when walking    diabetes controlled with diet  Peripheral neuropathy r/t DM  Hypothyroidism    PAST SURGICAL HISTORY:  Past Surgical History:   Procedure Laterality Date   • ABDOMINAL SURGERY     • APPENDECTOMY      6 yrs old   • CAROTID ENDARTERECTOMY     • CAROTID ENDARTERECTOMY Right    • COLONOSCOPY     • HYSTERECTOMY      age 29   • INGUINAL HERNIA REPAIR  1970   • MASTECTOMY WITH SENTINEL NODE BIOPSY AND AXILLARY NODE DISSECTION Left 12/15/2021    Procedure: BREAST MASTECTOMY WITH SENTINEL NODE BIOPSY;  Surgeon: Kaylen Saunders MD;  Location: [facility];  Service: General;  Laterality: Left;   Complete hysterectomy age 29 for dysmenorrhea     FAMILY HISTORY:  Family History   Problem Relation Age of Onset   • Hypertension Mother    • Thrombosis Father    • Diabetes Maternal Grandmother    • Breast cancer Neg Hx    m aunt may have had breast cancer (uncertain but she had mastectomy so suspected)  m cousin x 4 had colon cancer  2 siblings no cancer  4 other maternal aunts/uncles no cancer    SOCIAL HISTORY:  Social History     Socioeconomic History   • Marital status:    Tobacco  Use   • Smoking status: Former Smoker     Packs/day: 0.50     Years: 20.00     Pack years: 10.00     Types: Cigarettes     Quit date:      Years since quittin.0   • Smokeless tobacco: Never Used   Vaping Use   • Vaping Use: Never used   Substance and Sexual Activity   • Alcohol use: No   • Drug use: No   • Sexual activity: Defer   lives in an assisted living facility [facility]  Worked in an office setting, 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  Significant findings as mentioned above.  All other systems reviewed and are negative.      MEDICATIONS:  The current medication list was reviewed in the EMR    Current Outpatient Medications:   •  aspirin 81 MG EC tablet, Take 81 mg by mouth every night at bedtime., Disp: , Rfl:   •  HYDROcodone-acetaminophen (Norco) 7.5-325 MG per tablet, Take 1 tablet by mouth 4 (Four) Times a Day As Needed for Moderate Pain ., Disp: 15 tablet, Rfl: 0  •  Ibuprofen-diphenhydrAMINE HCl (Advil PM) 200-25 MG capsule, Take 1 capsule by mouth Every Night., Disp: , Rfl:   •  levothyroxine (SYNTHROID, LEVOTHROID) 150 MCG tablet, Take 1 tablet by mouth Daily., Disp: , Rfl:   •  multivitamin with minerals (OCUVITE EYE + MULTI PO), Take 1 tablet by mouth Daily., Disp: , Rfl:   •  polyethyl glycol-propyl glycol (SYSTANE) 0.4-0.3 % solution ophthalmic solution (artificial tears), 1 drop Every 3 (Three) Hours As Needed., Disp: , Rfl:   •  rosuvastatin (CRESTOR) 20 MG tablet, Take 20 mg by mouth Every Night., Disp: , Rfl:   •  sodium chloride 0.65 % nasal spray, 1 spray into the nostril(s) as directed by provider As Needed for Congestion., Disp: , Rfl:   •  valsartan (DIOVAN) 160 MG tablet, Take 160 mg by mouth Daily., Disp: , Rfl:     ALLERGIES:    Allergies   Allergen Reactions   • Morphine Shortness Of Breath       PHYSICAL EXAM:  Vitals:    22 1250   BP: 142/81   Pulse:  78   Temp: 97.7 °F (36.5 °C)   SpO2: 96%     Pain Score    01/12/22 1250   PainSc: 0-No pain     ECOG score: 1       PHQ-9 Total Score: 0    General:  Awake, alert and oriented, in no distress  HEENT:  Pupils are equal, round and reactive to light and accommodation, Extra-ocular movements full, Oropharyx clear, mucous membranes moist  Neck:  No JVD, thyromegaly or lymphadenopathy  CV:  Regular rate and rhythm, no murmurs, rubs or gallops  Resp:  Lungs are clear to auscultation bilaterally, no crackles  Abd:  Soft, non-tender, non-distended, bowel sounds present, no organomegaly or masses  Breast:  S/p L mastectomy.  Surgical incisions smooth and intact.  R breast without mass lesions. No axillary adenopathy on either side.  Ext:  No clubbing, cyanosis or edema  Lymph:  No cervical, supraclavicular, axillary, inguinal or femoral adenopathy  Neuro:  MS as above, CN II-XII intact, grossly non-focal exam      PATHOLOGY:  11-04-21            Genetic testing 11-08-21              12-15-21            ENDOSCOPY:        IMAGING:  Mammo Diagnostic Digital Tomosynthesis Bilateral With CAD with US Breast Left Limited 11-04-21  FINDINGS:  There are scattered areas of fibroglandular density.     Right breast: The fibroglandular pattern is unremarkable in appearance.  There are scattered benign-appearing calcifications. No dominant mass,  suspicious calcifications, or areas of architectural distortion are  seen.     Left breast: Correlating with the area the patient is feeling in the  left 12:00 to 1:00 region is an approximately 2.1 cm irregular isodense  mass with associated architectural distortion and coarse heterogeneous  calcifications. In the posterior left 3:00 region there is a second  irregular isodense mass containing faint calcifications. This area is  less discrete mammographically. Other scattered calcifications in the  left breast are noted. The remaining fibroglandular pattern of the left  breast is unremarkable.      Left breast ultrasound: Focused sonographic evaluation of the left  breast demonstrates a 2.0 cm irregular hypoechoic mass located at 12:00,  6 cm from the nipple, as well as a 1.9 cm irregular hypoechoic mass  located at 3:00, 6 cm from the nipple. Both are suspicious for  malignancy. Ultrasound of the left axilla demonstrates no abnormal  appearing axillary lymph nodes.     IMPRESSION:  1. Benign right mammographic findings.     2. 2.0 cm mass in the left 12:00 region and 1.9 cm mass in the left 3:00  region which are both suspicious for malignancy. Recommend  ultrasound-guided core biopsy.        BI-RADS CATEGORY:  5, HIGHLY SUGGESTIVE OF MALIGNANCY        RECOMMENDATION:  Recommend ultrasound-guided core biopsy of masses in  the left 12:00 and left 3:00 regions.     CAD was utilized.        NM PET/CT Skull Base to Mid Thigh 11-19-21  PET/CT FINDINGS: The images obtained from the base of the skull to the  thoracic inlet showed no enlarged lymph nodes or masses in the neck.  There were no areas of increased glucose uptake. At the thoracic inlet,  the thyroid gland is enlarged. There is increased uptake in both the  left and right lobes of the thyroid gland, which does extend slightly  into the substernal position. There is no supraclavicular  lymphadenopathy. No axillary lymphadenopathy was seen. There is a lesion  in the left breast that measures 15 mm in diameter. It does show some  increased glucose uptake with a SUV of 3.25. There is a hiatal hernia od  the stomach in the lower mediastinum. There are increased interstitial  markings noted throughout the lungs. The liver was normal in size and  shape. No focal lesions were seen in the liver. No adrenal lesions were  identified. The aorta shows atherosclerotic plaque but no aneurysmal  dilatation. There is no adenopathy in the retroperitoneum or mesentery.  No abnormal bone uptake was identified.     IMPRESSION:  The thyroid gland is enlarged, extends  substernally and does  show some increased glucose uptake. There is a well circumscribed lesion  in the upper portion of the left breast. It measures 15 mm in diameter  and is hypermetabolic with an SUV value of 3.25. This would be  consistent with the patient's breast cancer. At this time, there are no  PET fusion CT findings of metastatic disease.        RECENT LABS:  Lab Results   Component Value Date    WBC 5.39 12/13/2021    HGB 12.4 12/13/2021    HCT 39.3 12/13/2021    MCV 90.1 12/13/2021    RDW 13.9 12/13/2021     12/13/2021    NEUTRORELPCT 53.8 12/13/2021    LYMPHORELPCT 30.2 12/13/2021    MONORELPCT 10.8 12/13/2021    EOSRELPCT 3.7 12/13/2021    BASORELPCT 1.3 12/13/2021    NEUTROABS 2.90 12/13/2021    LYMPHSABS 1.63 12/13/2021       Lab Results   Component Value Date     12/13/2021    K 4.1 12/13/2021    CO2 24.4 12/13/2021     12/13/2021    BUN 17 12/13/2021    CREATININE 0.75 12/13/2021    EGFRIFNONA 74 12/13/2021    GLUCOSE 107 (H) 12/13/2021    CALCIUM 9.6 12/13/2021    ALKPHOS 110 12/13/2021    AST 14 12/13/2021    ALT 14 12/13/2021    BILITOT 0.6 12/13/2021    ALBUMIN 3.95 12/13/2021    PROTEINTOT 6.7 12/13/2021    MG 2.1 08/19/2018    PHOS 3.5 08/19/2018       Lab Results   Component Value Date/Time    URICACID 5.8 (H) 01/12/2019 08:41 PM         ASSESSMENT & PLAN:  Jackie Erwin is a very pleasant 81 y.o. 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    1.  Left Breast Cancers:       -  Stage IIA (pT1cN1(sn)MX) moderately differentiated Invasive Ductal Carcinoma. Tumor 20 mm in maximal dimension.            1/4 SLN w/ 6 mm metastasis without extranodal extension.   5% 1+ ER+, CO-, HER-2/Milena -.       -  Stage IIA  (pT1cN1(sn)MX) moderately differentiated Invasive Lobular Carcinoma.  Tumor 18 mm in maximal dimension.          1/4 SLN w/ 6 mm metastasis without extranodal extension.  100% 3+ ER+, 80% 2-3+ CO+, HER-2/Milena 10% 2+ by IHC,             equivocal by FISH, ultimately judged to be negative.    -  Ms. Erwin is s/p successful resection via simple L mastectomy / SLNBx.  She is recovering well from her surgery.  We discussed that we would often discuss consideration of Oncotype/Mammaprint, but given her age and comorbidities, I am concerned that she would not tolerate chemotherapy well. She and her daughter are in agreement.   -  Given this, I have recommended adjuvant hormonal therapy.    -  Will check DEXA scan to evaluate BMD.  She is at risk for OP given early surgical menopause.    2. CHEK-2 mutation:  -  Offered referral for genetic counseling, but she doesn't want to do this.  Her family members are aware and are being tested appropriately.    3. Fatigue:  -  Will check labwork including CBCD, CMP, B12, Folate and TSH    4.  Prophylaxis:  -  Had COVID vaccination + booster  -  Had 2021 influenza vaccination    5.  F/u:  -  Check labwork today: CBCD, CMP, B12, Folate, TSH  -  Check DEXA scan  -  Start Arimidex  -  Return after the above evaluation    I spent 60 minutes with Jackie Erwin today.  In the office today, more than 50% of this time was spent face-to-face with her  in counseling / coordination of care, reviewing her medical history and counseling on the current treatment plan.  All questions were answered to her satisfaction      Electronically Signed by: Radha Mills MD      CC:     Samuel Duane Kreis, MD Barbara A Michna, MD

## 2022-01-12 ENCOUNTER — CONSULT (OUTPATIENT)
Dept: ONCOLOGY | Facility: CLINIC | Age: 82
End: 2022-01-12

## 2022-01-12 VITALS
WEIGHT: 200.4 LBS | SYSTOLIC BLOOD PRESSURE: 142 MMHG | TEMPERATURE: 97.7 F | HEART RATE: 78 BPM | OXYGEN SATURATION: 96 % | HEIGHT: 64 IN | BODY MASS INDEX: 34.21 KG/M2 | DIASTOLIC BLOOD PRESSURE: 81 MMHG

## 2022-01-12 DIAGNOSIS — C50.919 CHEK2-RELATED BREAST CANCER: ICD-10-CM

## 2022-01-12 DIAGNOSIS — C50.812 MALIGNANT NEOPLASM OF OVERLAPPING SITES OF LEFT BREAST IN FEMALE, ESTROGEN RECEPTOR NEGATIVE: ICD-10-CM

## 2022-01-12 DIAGNOSIS — Z15.09 CHEK2-RELATED BREAST CANCER: ICD-10-CM

## 2022-01-12 DIAGNOSIS — Z17.0 MALIGNANT NEOPLASM OF OVERLAPPING SITES OF LEFT BREAST IN FEMALE, ESTROGEN RECEPTOR POSITIVE: Primary | ICD-10-CM

## 2022-01-12 DIAGNOSIS — C50.812 MALIGNANT NEOPLASM OF OVERLAPPING SITES OF LEFT BREAST IN FEMALE, ESTROGEN RECEPTOR POSITIVE: Primary | ICD-10-CM

## 2022-01-12 DIAGNOSIS — Z15.02 CHEK2-RELATED BREAST CANCER: ICD-10-CM

## 2022-01-12 DIAGNOSIS — Z91.89 AT HIGH RISK FOR OSTEOPOROSIS: ICD-10-CM

## 2022-01-12 DIAGNOSIS — Z17.1 MALIGNANT NEOPLASM OF OVERLAPPING SITES OF LEFT BREAST IN FEMALE, ESTROGEN RECEPTOR NEGATIVE: ICD-10-CM

## 2022-01-12 DIAGNOSIS — M85.89 OTHER SPECIFIED DISORDERS OF BONE DENSITY AND STRUCTURE, MULTIPLE SITES: ICD-10-CM

## 2022-01-12 DIAGNOSIS — Z15.89 CHEK2-RELATED BREAST CANCER: ICD-10-CM

## 2022-01-12 DIAGNOSIS — R53.83 FATIGUE, UNSPECIFIED TYPE: ICD-10-CM

## 2022-01-12 LAB
ALBUMIN SERPL-MCNC: 4.32 G/DL (ref 3.5–5.2)
ALBUMIN/GLOB SERPL: 1.5 G/DL
ALP SERPL-CCNC: 124 U/L (ref 39–117)
ALT SERPL W P-5'-P-CCNC: 18 U/L (ref 1–33)
ANION GAP SERPL CALCULATED.3IONS-SCNC: 12.1 MMOL/L (ref 5–15)
AST SERPL-CCNC: 19 U/L (ref 1–32)
BASOPHILS # BLD AUTO: 0.09 10*3/MM3 (ref 0–0.2)
BASOPHILS NFR BLD AUTO: 1.1 % (ref 0–1.5)
BILIRUB SERPL-MCNC: 0.3 MG/DL (ref 0–1.2)
BUN SERPL-MCNC: 18 MG/DL (ref 8–23)
BUN/CREAT SERPL: 26.5 (ref 7–25)
CALCIUM SPEC-SCNC: 9.7 MG/DL (ref 8.6–10.5)
CHLORIDE SERPL-SCNC: 98 MMOL/L (ref 98–107)
CO2 SERPL-SCNC: 22.9 MMOL/L (ref 22–29)
CREAT SERPL-MCNC: 0.68 MG/DL (ref 0.57–1)
DEPRECATED RDW RBC AUTO: 43.5 FL (ref 37–54)
EOSINOPHIL # BLD AUTO: 0.31 10*3/MM3 (ref 0–0.4)
EOSINOPHIL NFR BLD AUTO: 3.8 % (ref 0.3–6.2)
ERYTHROCYTE [DISTWIDTH] IN BLOOD BY AUTOMATED COUNT: 13.5 % (ref 12.3–15.4)
GFR SERPL CREATININE-BSD FRML MDRD: 83 ML/MIN/1.73
GLOBULIN UR ELPH-MCNC: 2.9 GM/DL
GLUCOSE SERPL-MCNC: 96 MG/DL (ref 65–99)
HCT VFR BLD AUTO: 38.5 % (ref 34–46.6)
HGB BLD-MCNC: 12.5 G/DL (ref 12–15.9)
IMM GRANULOCYTES # BLD AUTO: 0.05 10*3/MM3 (ref 0–0.05)
IMM GRANULOCYTES NFR BLD AUTO: 0.6 % (ref 0–0.5)
LYMPHOCYTES # BLD AUTO: 2.34 10*3/MM3 (ref 0.7–3.1)
LYMPHOCYTES NFR BLD AUTO: 28.4 % (ref 19.6–45.3)
MCH RBC QN AUTO: 28.6 PG (ref 26.6–33)
MCHC RBC AUTO-ENTMCNC: 32.5 G/DL (ref 31.5–35.7)
MCV RBC AUTO: 88.1 FL (ref 79–97)
MONOCYTES # BLD AUTO: 0.67 10*3/MM3 (ref 0.1–0.9)
MONOCYTES NFR BLD AUTO: 8.1 % (ref 5–12)
NEUTROPHILS NFR BLD AUTO: 4.78 10*3/MM3 (ref 1.7–7)
NEUTROPHILS NFR BLD AUTO: 58 % (ref 42.7–76)
NRBC BLD AUTO-RTO: 0 /100 WBC (ref 0–0.2)
PLATELET # BLD AUTO: 339 10*3/MM3 (ref 140–450)
PMV BLD AUTO: 8.9 FL (ref 6–12)
POTASSIUM SERPL-SCNC: 4.6 MMOL/L (ref 3.5–5.2)
PROT SERPL-MCNC: 7.2 G/DL (ref 6–8.5)
RBC # BLD AUTO: 4.37 10*6/MM3 (ref 3.77–5.28)
SODIUM SERPL-SCNC: 133 MMOL/L (ref 136–145)
TSH SERPL DL<=0.05 MIU/L-ACNC: 0.23 UIU/ML (ref 0.27–4.2)
WBC NRBC COR # BLD: 8.24 10*3/MM3 (ref 3.4–10.8)

## 2022-01-12 PROCEDURE — 99205 OFFICE O/P NEW HI 60 MIN: CPT | Performed by: INTERNAL MEDICINE

## 2022-01-12 PROCEDURE — 82746 ASSAY OF FOLIC ACID SERUM: CPT | Performed by: INTERNAL MEDICINE

## 2022-01-12 PROCEDURE — 82607 VITAMIN B-12: CPT | Performed by: INTERNAL MEDICINE

## 2022-01-12 PROCEDURE — 84443 ASSAY THYROID STIM HORMONE: CPT | Performed by: INTERNAL MEDICINE

## 2022-01-12 PROCEDURE — 80053 COMPREHEN METABOLIC PANEL: CPT | Performed by: INTERNAL MEDICINE

## 2022-01-12 PROCEDURE — 85025 COMPLETE CBC W/AUTO DIFF WBC: CPT | Performed by: INTERNAL MEDICINE

## 2022-01-13 LAB
FOLATE SERPL-MCNC: 18.7 NG/ML (ref 4.78–24.2)
VIT B12 BLD-MCNC: 386 PG/ML (ref 211–946)

## 2022-01-24 ENCOUNTER — APPOINTMENT (OUTPATIENT)
Dept: BONE DENSITY | Facility: HOSPITAL | Age: 82
End: 2022-01-24

## 2022-01-31 ENCOUNTER — OFFICE VISIT (OUTPATIENT)
Dept: ONCOLOGY | Facility: CLINIC | Age: 82
End: 2022-01-31

## 2022-01-31 VITALS
BODY MASS INDEX: 34.66 KG/M2 | WEIGHT: 203 LBS | DIASTOLIC BLOOD PRESSURE: 74 MMHG | HEIGHT: 64 IN | OXYGEN SATURATION: 94 % | HEART RATE: 80 BPM | TEMPERATURE: 97.1 F | RESPIRATION RATE: 18 BRPM | SYSTOLIC BLOOD PRESSURE: 156 MMHG

## 2022-01-31 DIAGNOSIS — Z15.89 CHEK2-RELATED BREAST CANCER: ICD-10-CM

## 2022-01-31 DIAGNOSIS — Z15.02 CHEK2-RELATED BREAST CANCER: ICD-10-CM

## 2022-01-31 DIAGNOSIS — R53.83 FATIGUE, UNSPECIFIED TYPE: ICD-10-CM

## 2022-01-31 DIAGNOSIS — C50.919 CHEK2-RELATED BREAST CANCER: ICD-10-CM

## 2022-01-31 DIAGNOSIS — C50.812 MALIGNANT NEOPLASM OF OVERLAPPING SITES OF LEFT BREAST IN FEMALE, ESTROGEN RECEPTOR POSITIVE: Primary | ICD-10-CM

## 2022-01-31 DIAGNOSIS — Z17.0 MALIGNANT NEOPLASM OF OVERLAPPING SITES OF LEFT BREAST IN FEMALE, ESTROGEN RECEPTOR POSITIVE: Primary | ICD-10-CM

## 2022-01-31 DIAGNOSIS — Z15.09 CHEK2-RELATED BREAST CANCER: ICD-10-CM

## 2022-01-31 PROCEDURE — 99213 OFFICE O/P EST LOW 20 MIN: CPT | Performed by: INTERNAL MEDICINE

## 2022-01-31 RX ORDER — ANASTROZOLE 1 MG/1
1 TABLET ORAL DAILY
Qty: 30 TABLET | Refills: 11 | Status: SHIPPED | OUTPATIENT
Start: 2022-01-31 | End: 2022-05-18 | Stop reason: SDUPTHER

## 2022-02-11 ENCOUNTER — HOSPITAL ENCOUNTER (OUTPATIENT)
Dept: BONE DENSITY | Facility: HOSPITAL | Age: 82
Discharge: HOME | End: 2022-02-11
Admitting: INTERNAL MEDICINE

## 2022-02-11 DIAGNOSIS — Z91.89 AT HIGH RISK FOR OSTEOPOROSIS: ICD-10-CM

## 2022-02-11 DIAGNOSIS — M85.89 OTHER SPECIFIED DISORDERS OF BONE DENSITY AND STRUCTURE, MULTIPLE SITES: ICD-10-CM

## 2022-02-11 PROCEDURE — 77080 DXA BONE DENSITY AXIAL: CPT

## 2022-02-11 PROCEDURE — 77080 DXA BONE DENSITY AXIAL: CPT | Performed by: RADIOLOGY

## 2022-02-19 ENCOUNTER — APPOINTMENT (OUTPATIENT)
Dept: ULTRASOUND IMAGING | Facility: HOSPITAL | Age: 82
End: 2022-02-19

## 2022-02-19 ENCOUNTER — HOSPITAL ENCOUNTER (EMERGENCY)
Facility: HOSPITAL | Age: 82
Discharge: HOME | End: 2022-02-19
Attending: EMERGENCY MEDICINE | Admitting: EMERGENCY MEDICINE

## 2022-02-19 VITALS
BODY MASS INDEX: 34.15 KG/M2 | HEIGHT: 64 IN | DIASTOLIC BLOOD PRESSURE: 90 MMHG | WEIGHT: 200 LBS | TEMPERATURE: 97.8 F | RESPIRATION RATE: 16 BRPM | SYSTOLIC BLOOD PRESSURE: 138 MMHG | OXYGEN SATURATION: 96 % | HEART RATE: 72 BPM

## 2022-02-19 DIAGNOSIS — M79.605 LEFT LEG PAIN: Primary | ICD-10-CM

## 2022-02-19 LAB
ALBUMIN SERPL-MCNC: 4.17 G/DL (ref 3.5–5.2)
ALBUMIN/GLOB SERPL: 1.6 G/DL
ALP SERPL-CCNC: 118 U/L (ref 39–117)
ALT SERPL W P-5'-P-CCNC: 12 U/L (ref 1–33)
ANION GAP SERPL CALCULATED.3IONS-SCNC: 11.4 MMOL/L (ref 5–15)
APTT PPP: 29.9 SECONDS (ref 25.5–35.4)
AST SERPL-CCNC: 13 U/L (ref 1–32)
BASOPHILS # BLD AUTO: 0.07 10*3/MM3 (ref 0–0.2)
BASOPHILS NFR BLD AUTO: 1.2 % (ref 0–1.5)
BILIRUB SERPL-MCNC: 0.4 MG/DL (ref 0–1.2)
BUN SERPL-MCNC: 19 MG/DL (ref 8–23)
BUN/CREAT SERPL: 27.9 (ref 7–25)
CALCIUM SPEC-SCNC: 9.7 MG/DL (ref 8.6–10.5)
CHLORIDE SERPL-SCNC: 103 MMOL/L (ref 98–107)
CO2 SERPL-SCNC: 23.6 MMOL/L (ref 22–29)
CREAT SERPL-MCNC: 0.68 MG/DL (ref 0.57–1)
CRP SERPL-MCNC: <0.3 MG/DL (ref 0–0.5)
DEPRECATED RDW RBC AUTO: 46 FL (ref 37–54)
EOSINOPHIL # BLD AUTO: 0.24 10*3/MM3 (ref 0–0.4)
EOSINOPHIL NFR BLD AUTO: 4 % (ref 0.3–6.2)
ERYTHROCYTE [DISTWIDTH] IN BLOOD BY AUTOMATED COUNT: 14.1 % (ref 12.3–15.4)
GFR SERPL CREATININE-BSD FRML MDRD: 82 ML/MIN/1.73
GLOBULIN UR ELPH-MCNC: 2.6 GM/DL
GLUCOSE SERPL-MCNC: 110 MG/DL (ref 65–99)
HCT VFR BLD AUTO: 37.5 % (ref 34–46.6)
HGB BLD-MCNC: 12.1 G/DL (ref 12–15.9)
HOLD SPECIMEN: NORMAL
HOLD SPECIMEN: NORMAL
IMM GRANULOCYTES # BLD AUTO: 0.04 10*3/MM3 (ref 0–0.05)
IMM GRANULOCYTES NFR BLD AUTO: 0.7 % (ref 0–0.5)
INR PPP: 0.97 (ref 0.9–1.1)
LYMPHOCYTES # BLD AUTO: 1.34 10*3/MM3 (ref 0.7–3.1)
LYMPHOCYTES NFR BLD AUTO: 22.6 % (ref 19.6–45.3)
MAGNESIUM SERPL-MCNC: 2.1 MG/DL (ref 1.6–2.4)
MCH RBC QN AUTO: 28.9 PG (ref 26.6–33)
MCHC RBC AUTO-ENTMCNC: 32.3 G/DL (ref 31.5–35.7)
MCV RBC AUTO: 89.7 FL (ref 79–97)
MONOCYTES # BLD AUTO: 0.73 10*3/MM3 (ref 0.1–0.9)
MONOCYTES NFR BLD AUTO: 12.3 % (ref 5–12)
NEUTROPHILS NFR BLD AUTO: 3.51 10*3/MM3 (ref 1.7–7)
NEUTROPHILS NFR BLD AUTO: 59.2 % (ref 42.7–76)
NRBC BLD AUTO-RTO: 0 /100 WBC (ref 0–0.2)
PLATELET # BLD AUTO: 276 10*3/MM3 (ref 140–450)
PMV BLD AUTO: 9.6 FL (ref 6–12)
POTASSIUM SERPL-SCNC: 3.8 MMOL/L (ref 3.5–5.2)
PROT SERPL-MCNC: 6.8 G/DL (ref 6–8.5)
PROTHROMBIN TIME: 13.2 SECONDS (ref 12.8–14.5)
RBC # BLD AUTO: 4.18 10*6/MM3 (ref 3.77–5.28)
SODIUM SERPL-SCNC: 138 MMOL/L (ref 136–145)
WBC NRBC COR # BLD: 5.93 10*3/MM3 (ref 3.4–10.8)
WHOLE BLOOD HOLD SPECIMEN: NORMAL
WHOLE BLOOD HOLD SPECIMEN: NORMAL

## 2022-02-19 PROCEDURE — 80053 COMPREHEN METABOLIC PANEL: CPT | Performed by: PHYSICIAN ASSISTANT

## 2022-02-19 PROCEDURE — 86140 C-REACTIVE PROTEIN: CPT | Performed by: PHYSICIAN ASSISTANT

## 2022-02-19 PROCEDURE — 99283 EMERGENCY DEPT VISIT LOW MDM: CPT

## 2022-02-19 PROCEDURE — 85025 COMPLETE CBC W/AUTO DIFF WBC: CPT | Performed by: PHYSICIAN ASSISTANT

## 2022-02-19 PROCEDURE — 85610 PROTHROMBIN TIME: CPT | Performed by: PHYSICIAN ASSISTANT

## 2022-02-19 PROCEDURE — 85730 THROMBOPLASTIN TIME PARTIAL: CPT | Performed by: PHYSICIAN ASSISTANT

## 2022-02-19 PROCEDURE — 83735 ASSAY OF MAGNESIUM: CPT | Performed by: PHYSICIAN ASSISTANT

## 2022-02-19 PROCEDURE — 93971 EXTREMITY STUDY: CPT

## 2022-02-19 RX ORDER — SODIUM CHLORIDE 0.9 % (FLUSH) 0.9 %
10 SYRINGE (ML) INJECTION AS NEEDED
Status: DISCONTINUED | OUTPATIENT
Start: 2022-02-19 | End: 2022-02-19 | Stop reason: HOSPADM

## 2022-02-19 NOTE — ED PROVIDER NOTES
Subjective   82-year-old female who presents to the ED today for left leg pain.  This started about 3 days ago.  She states she has been having pain in her calf that now radiates up into her thigh.  She denies any recent falls or injuries.  She did have surgery in December for a mastectomy.  She is concerned that she may have a DVT.  She is not currently on any anticoagulation.  She states that she has had swelling in both of her legs and she feels like the swelling is currently worse in her left leg.      History provided by:  Patient  Leg Pain  Location:  Leg  Time since incident:  3 days  Leg location:  L lower leg  Pain details:     Quality:  Aching    Severity:  Moderate    Onset quality:  Gradual    Timing:  Constant    Progression:  Worsening  Chronicity:  New  Relieved by:  Nothing  Worsened by:  Activity  Associated symptoms: swelling    Associated symptoms: no decreased ROM, no fever and no muscle weakness        Review of Systems   Constitutional: Negative.  Negative for fever.   HENT: Negative.    Eyes: Negative.    Respiratory: Negative.    Cardiovascular: Positive for leg swelling.   Gastrointestinal: Negative.    Genitourinary: Negative.    Musculoskeletal: Positive for myalgias.   Skin: Negative.    Neurological: Negative.    Psychiatric/Behavioral: Negative.    All other systems reviewed and are negative.      Past Medical History:   Diagnosis Date   • Arthritis    • Arthritis    • Cancer (HCC)     LEFT BREAST   • Carotid artery disease (HCC)     Right sided, s/p Right carotid endarterectomy 2011   • Diabetes mellitus (HCC)     controlled with diet   • Diabetic neuropathy (HCC)    • Disease of thyroid gland     hypothyroidism   • Elevated cholesterol    • Hyperlipidemia    • Hypertension    • Neuropathy     LOWER LEGS AND FEET   • Peripheral neuropathy     r/t neuropathy   • Spinal headache    • Stroke (HCC)    • TIA (transient ischemic attack) 2011   • Unsteady gait when walking        Allergies    Allergen Reactions   • Morphine Shortness Of Breath       Past Surgical History:   Procedure Laterality Date   • ABDOMINAL SURGERY     • APPENDECTOMY      6 yrs old   • CAROTID ENDARTERECTOMY     • CAROTID ENDARTERECTOMY Right    • COLONOSCOPY     • HYSTERECTOMY      age 29 done for dysmenorrhea   • INGUINAL HERNIA REPAIR     • MASTECTOMY WITH SENTINEL NODE BIOPSY AND AXILLARY NODE DISSECTION Left 12/15/2021    Procedure: BREAST MASTECTOMY WITH SENTINEL NODE BIOPSY;  Surgeon: Kaylen Saunders MD;  Location: [facility];  Service: General;  Laterality: Left;       Family History   Problem Relation Age of Onset   • Hypertension Mother    • Thrombosis Father    • Diabetes Maternal Grandmother    • Cancer Maternal Aunt         ?breast cancer, had mastectomy   • Cancer Maternal Cousin         colon   • Cancer Maternal Cousin         colon   • Cancer Maternal Cousin         colon   • Cancer Maternal Cousin         colon   • Breast cancer Neg Hx        Social History     Socioeconomic History   • Marital status:    Tobacco Use   • Smoking status: Former Smoker     Packs/day: 0.50     Years: 20.00     Pack years: 10.00     Types: Cigarettes     Quit date:      Years since quittin.1   • Smokeless tobacco: Never Used   Vaping Use   • Vaping Use: Never used   Substance and Sexual Activity   • Alcohol use: No   • Drug use: No   • Sexual activity: Defer           Objective   Physical Exam  Vitals and nursing note reviewed.   Constitutional:       General: She is not in acute distress.     Appearance: Normal appearance.   HENT:      Head: Normocephalic and atraumatic.      Right Ear: External ear normal.      Left Ear: External ear normal.   Eyes:      Conjunctiva/sclera: Conjunctivae normal.      Pupils: Pupils are equal, round, and reactive to light.   Cardiovascular:      Rate and Rhythm: Normal rate and regular rhythm.      Pulses: Normal pulses.      Heart sounds: Normal heart sounds.   Pulmonary:       Effort: Pulmonary effort is normal.      Breath sounds: Normal breath sounds. No wheezing or rhonchi.   Abdominal:      General: Bowel sounds are normal.      Palpations: Abdomen is soft.      Tenderness: There is no abdominal tenderness.   Musculoskeletal:         General: Normal range of motion.      Cervical back: Normal range of motion and neck supple.      Comments: Left calf tenderness on exam.  Swelling noted to both legs, mildly worse on the left.  Pedal pulses are 2+ bilaterally. Cap refill is normal. Patient does have peripheral neuropathy. No evidence of wounds or infections present.   Skin:     General: Skin is warm and dry.      Capillary Refill: Capillary refill takes less than 2 seconds.   Neurological:      General: No focal deficit present.      Mental Status: She is alert and oriented to person, place, and time.   Psychiatric:         Mood and Affect: Mood normal.         Procedures           ED Course  ED Course as of 02/19/22 1238   Sat Feb 19, 2022   1158 US Venous Doppler Lower Extremity Left (duplex)  FINDINGS:  No evidence of a left lower extremity deep vein thrombosis. The common femoral vein through the popliteal vein are widely patent. There is normal compressibility with spontaneous and phasic waveforms. No calf vein thrombus. Greater saphenous vein is  patent.     IMPRESSION:  No deep venous thrombus in the left lower extremity.  [AH]   1227 No DVT found on venous Doppler.  No acute findings on patient's lab work.  She will be able to be discharged home to follow-up outpatient.  She was advised on elevating her legs and wearing compression stockings as needed.  She will follow-up outpatient will return to the ED if her symptoms change or worsen. [AH]      ED Course User Index  [AH] Catrina Bullock PA                                                 [facility]  Number of Diagnoses or Management Options     Amount and/or Complexity of Data Reviewed  Clinical lab tests: reviewed  Tests in the  radiology section of CPT®: reviewed    Patient Progress  Patient progress: stable      Final diagnoses:   Left leg pain       ED Disposition  ED Disposition     ED Disposition Condition Comment    Discharge Stable           Kreis, Samuel Duane, MD  [address] [place] [address] [place] [address] [phone]    Schedule an appointment as soon as possible for a visit in 2 days           Medication List      No changes were made to your prescriptions during this visit.          Catrina Bullock, PA  02/19/22 1230

## 2022-02-19 NOTE — DISCHARGE INSTRUCTIONS
Elevate your legs as much as possible.  Wear compression socks or stockings when you are going to be up on your feet.  Follow-up with your family doctor the next available appointment.  Return to the ED if your symptoms change or worsen.

## 2022-03-01 ENCOUNTER — OFFICE VISIT (OUTPATIENT)
Dept: ONCOLOGY | Facility: CLINIC | Age: 82
End: 2022-03-01

## 2022-03-01 VITALS
BODY MASS INDEX: 34.35 KG/M2 | OXYGEN SATURATION: 94 % | DIASTOLIC BLOOD PRESSURE: 70 MMHG | RESPIRATION RATE: 18 BRPM | HEIGHT: 64 IN | SYSTOLIC BLOOD PRESSURE: 133 MMHG | HEART RATE: 90 BPM | TEMPERATURE: 97 F | WEIGHT: 201.2 LBS

## 2022-03-01 DIAGNOSIS — Z15.89 CHEK2-RELATED BREAST CANCER: ICD-10-CM

## 2022-03-01 DIAGNOSIS — Z17.0 MALIGNANT NEOPLASM OF OVERLAPPING SITES OF LEFT BREAST IN FEMALE, ESTROGEN RECEPTOR POSITIVE: Primary | ICD-10-CM

## 2022-03-01 DIAGNOSIS — C50.812 MALIGNANT NEOPLASM OF OVERLAPPING SITES OF LEFT BREAST IN FEMALE, ESTROGEN RECEPTOR POSITIVE: Primary | ICD-10-CM

## 2022-03-01 DIAGNOSIS — Z15.02 CHEK2-RELATED BREAST CANCER: ICD-10-CM

## 2022-03-01 DIAGNOSIS — M85.89 OSTEOPENIA OF MULTIPLE SITES: ICD-10-CM

## 2022-03-01 DIAGNOSIS — C50.919 CHEK2-RELATED BREAST CANCER: ICD-10-CM

## 2022-03-01 DIAGNOSIS — Z15.09 CHEK2-RELATED BREAST CANCER: ICD-10-CM

## 2022-03-01 PROCEDURE — 99213 OFFICE O/P EST LOW 20 MIN: CPT | Performed by: INTERNAL MEDICINE

## 2022-03-01 RX ORDER — FUROSEMIDE 20 MG/1
20 TABLET ORAL DAILY
Qty: 2 TABLET | Refills: 0 | Status: SHIPPED | OUTPATIENT
Start: 2022-03-01 | End: 2022-03-03

## 2022-03-01 RX ORDER — POTASSIUM CHLORIDE 750 MG/1
10 TABLET, EXTENDED RELEASE ORAL DAILY
Qty: 2 TABLET | Refills: 0 | Status: SHIPPED | OUTPATIENT
Start: 2022-03-01

## 2022-03-01 NOTE — PROGRESS NOTES
NAME: Jackie Erwin    : 1940    DATE:  22    DIAGNOSIS:   Left Breast Cancers:    -  Stage IIA (pT1cN1(sn)MX) moderately differentiated Invasive Ductal Carcinoma.             Tumor 20 mm in maximal dimension.              1/4 SLN w/ 6 mm metastasis without extranodal extension.                5% 1+ ER+, NJ-, HER-2/Milena -.    -  Stage IIA  (pT1cN1(sn)MX) moderately differentiated Invasive Lobular Carcinoma.               Tumor 18 mm in maximal dimension.             1/4 SLN w/ 6 mm metastasis without extranodal extension.               100% 3+ ER+, 80% 2-3+ NJ+, HER-2/Milena 10% 2+ by IHC,            equivocal by FISH, ultimately judged to be negative.    CHIEF COMPLAINT:  Follow up of Breast Cancer       HISTORY OF PRESENT ILLNESS:   Jackie Erwin is a very pleasant 82 y.o. female who is being seen today at the request of No ref. provider found for evaluation and treatment of newly diagnosed breast cancer.  She says she noticed a lump in her L breast and so presented for further evaluation. She saw her PCP and was referred for imaging and biopsy and then saw Dr. Saunders.  She had biopsies of 2 distincet areas in the L breast.  The first showed IDC Grade 2 which was weakly ER+ and NJ/HER-2 negative.  The other was an ILC which was strongly ER/NJ + and HER2 equivocal by IHC/FISH but ultimately judged to be negative.   On 12/15/21, Dr. Saunders took her for L simple mastectomy.  Final pathology also showed two separate tumors as below.  1/4 SLN showed a 6 mm metastasis without extranodal extension.      Ms. Erwin is currently healing well and denies any specific complaints. She reports having a post op fever of 102.4 and was taken to [facility] ED for evaluation. She completed 2 doses of IV antibiotics and no identifiable cause was found and she has remained afebrile. She has completed a course of both oral Keflex and oral Bactrim.  She hasn't had further fevers.  She complains of fatigue which has  worsened since her surgery but has started walking again.  She lost about 5 lbs but weight has since been stable.  She has frequent chronic cough (since developing [place] in [place] in 1986) but this is no worse than her baseline..  She denies chest pain or shortness of breath.  She denies abdominal pain, nausea/vomiting/diarrha.  She has on/off constipation.  No rectal bleeding.  She is otherwise well and without complaint.      INTERVAL HISTORY:   Ms. Erwin is here today for follow up of breast cancer. She is taking Arimidex and is tolerating it well. She denies any specific complaints. Since her last visit, she had DEXA scan with T-score of -2.3 representing osteopenia. I discussed the option of bone therapy with Prolia, but she declines at this time.   - We alternatively discussed continuing Arimidex and starting Calcium w/ Vit D twice daily or changing Arimidex to Tamoxifen and starting Ca/Vit D twice daily.  After a lengthy discussion, she would prefer to keep Arimidex, start Ca/Vit D and hold off on Prolia.  She complains today of BLE edema.  She says she used to take combination BP pill which contained a diuretic but Dr. Bennett changed it.  She isn't sure why.  Legs are tight / uncomfortable.       PAST MEDICAL HISTORY:  Past Medical History:   Diagnosis Date   • Arthritis    • Arthritis    • Cancer (HCC)     LEFT BREAST   • Carotid artery disease (HCC)     Right sided, s/p Right carotid endarterectomy 2011   • Diabetes mellitus (HCC)     controlled with diet   • Diabetic neuropathy (HCC)    • Disease of thyroid gland     hypothyroidism   • Elevated cholesterol    • Hyperlipidemia    • Hypertension    • Neuropathy     LOWER LEGS AND FEET   • Peripheral neuropathy     r/t neuropathy   • Spinal headache    • Stroke (HCC)    • TIA (transient ischemic attack) 2011   • Unsteady gait when walking        PAST SURGICAL HISTORY:  Past Surgical History:   Procedure Laterality Date   • ABDOMINAL SURGERY     •  APPENDECTOMY      6 yrs old   • CAROTID ENDARTERECTOMY     • CAROTID ENDARTERECTOMY Right    • COLONOSCOPY     • HYSTERECTOMY      age 29 done for dysmenorrhea   • INGUINAL HERNIA REPAIR     • MASTECTOMY WITH SENTINEL NODE BIOPSY AND AXILLARY NODE DISSECTION Left 12/15/2021    Procedure: BREAST MASTECTOMY WITH SENTINEL NODE BIOPSY;  Surgeon: Kaylen Saunders MD;  Location: [facility];  Service: General;  Laterality: Left;       FAMILY HISTORY:  Family History   Problem Relation Age of Onset   • Hypertension Mother    • Thrombosis Father    • Diabetes Maternal Grandmother    • Cancer Maternal Aunt         ?breast cancer, had mastectomy   • Cancer Maternal Cousin         colon   • Cancer Maternal Cousin         colon   • Cancer Maternal Cousin         colon   • Cancer Maternal Cousin         colon   • Breast cancer Neg Hx    2 siblings no cancer  4 other maternal aunts/uncles no cancer    SOCIAL HISTORY:  Social History     Socioeconomic History   • Marital status:    Tobacco Use   • Smoking status: Former Smoker     Packs/day: 0.50     Years: 20.00     Pack years: 10.00     Types: Cigarettes     Quit date:      Years since quittin.1   • Smokeless tobacco: Never Used   Vaping Use   • Vaping Use: Never used   Substance and Sexual Activity   • Alcohol use: No   • Drug use: No   • Sexual activity: Defer     Social History     Social History Narrative    lives in an assisted living facility [facility]    Worked in an office setting, 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  Significant findings as mentioned above.  All other systems reviewed and are negative.      MEDICATIONS:  The current medication list was reviewed in the EMR    Current Outpatient Medications:   •  anastrozole (ARIMIDEX) 1 MG tablet, Take 1 tablet by mouth Daily., Disp: 30 tablet, Rfl: 11  •  aspirin 81 MG EC  tablet, Take 81 mg by mouth every night at bedtime., Disp: , Rfl:   •  HYDROcodone-acetaminophen (Norco) 7.5-325 MG per tablet, Take 1 tablet by mouth 4 (Four) Times a Day As Needed for Moderate Pain ., Disp: 15 tablet, Rfl: 0  •  Ibuprofen-diphenhydrAMINE HCl (Advil PM) 200-25 MG capsule, Take 1 capsule by mouth Every Night., Disp: , Rfl:   •  levothyroxine (SYNTHROID, LEVOTHROID) 150 MCG tablet, Take 1 tablet by mouth Daily., Disp: , Rfl:   •  multivitamin with minerals (OCUVITE EYE + MULTI PO), Take 1 tablet by mouth Daily., Disp: , Rfl:   •  polyethyl glycol-propyl glycol (SYSTANE) 0.4-0.3 % solution ophthalmic solution (artificial tears), 1 drop Every 3 (Three) Hours As Needed., Disp: , Rfl:   •  rosuvastatin (CRESTOR) 20 MG tablet, Take 20 mg by mouth Every Night., Disp: , Rfl:   •  sodium chloride 0.65 % nasal spray, 1 spray into the nostril(s) as directed by provider As Needed for Congestion., Disp: , Rfl:   •  valsartan (DIOVAN) 160 MG tablet, Take 160 mg by mouth Daily., Disp: , Rfl:     ALLERGIES:    Allergies   Allergen Reactions   • Morphine Shortness Of Breath       PHYSICAL EXAM:  Vitals:    03/01/22 1313   BP: 133/70   Pulse: 90   Resp: 18   Temp: 97 °F (36.1 °C)   SpO2: 94%     Pain Score    03/01/22 1313   PainSc: 0-No pain     ECOG score: 0       General:  Awake, alert and oriented, in no distress  HEENT:  Pupils are equal, round and reactive to light and accommodation, Extra-ocular movements full, Oropharyx clear, mucous membranes moist  Neck:  No JVD, thyromegaly or lymphadenopathy  CV:  Regular rate and rhythm, no murmurs, rubs or gallops  Resp:  Lungs are clear to auscultation bilaterally, no crackles  Abd:  Soft, non-tender, non-distended, bowel sounds present, no organomegaly or masses  Breast:  S/p L mastectomy.  Surgical incisions smooth and intact.  R breast without mass lesions. No axillary adenopathy on either side.  Ext:  No clubbing, cyanosis, she has symmetric 1+ BLE edema  Lymph:   No cervical, supraclavicular, axillary, inguinal or femoral adenopathy  Neuro:  MS as above, CN II-XII intact, grossly non-focal exam      PATHOLOGY:  11-04-21            Genetic testing 11-08-21              12-15-21            ENDOSCOPY:        IMAGING:  Mammo Diagnostic Digital Tomosynthesis Bilateral With CAD with US Breast Left Limited 11-04-21  FINDINGS:  There are scattered areas of fibroglandular density.     Right breast: The fibroglandular pattern is unremarkable in appearance.  There are scattered benign-appearing calcifications. No dominant mass,  suspicious calcifications, or areas of architectural distortion are  seen.     Left breast: Correlating with the area the patient is feeling in the  left 12:00 to 1:00 region is an approximately 2.1 cm irregular isodense  mass with associated architectural distortion and coarse heterogeneous  calcifications. In the posterior left 3:00 region there is a second  irregular isodense mass containing faint calcifications. This area is  less discrete mammographically. Other scattered calcifications in the  left breast are noted. The remaining fibroglandular pattern of the left  breast is unremarkable.     Left breast ultrasound: Focused sonographic evaluation of the left  breast demonstrates a 2.0 cm irregular hypoechoic mass located at 12:00,  6 cm from the nipple, as well as a 1.9 cm irregular hypoechoic mass  located at 3:00, 6 cm from the nipple. Both are suspicious for  malignancy. Ultrasound of the left axilla demonstrates no abnormal  appearing axillary lymph nodes.     IMPRESSION:  1. Benign right mammographic findings.     2. 2.0 cm mass in the left 12:00 region and 1.9 cm mass in the left 3:00  region which are both suspicious for malignancy. Recommend  ultrasound-guided core biopsy.        BI-RADS CATEGORY:  5, HIGHLY SUGGESTIVE OF MALIGNANCY        RECOMMENDATION:  Recommend ultrasound-guided core biopsy of masses in  the left 12:00 and left 3:00  regions.     CAD was utilized.        NM PET/CT Skull Base to Mid Thigh 11-19-21  PET/CT FINDINGS: The images obtained from the base of the skull to the  thoracic inlet showed no enlarged lymph nodes or masses in the neck.  There were no areas of increased glucose uptake. At the thoracic inlet,  the thyroid gland is enlarged. There is increased uptake in both the  left and right lobes of the thyroid gland, which does extend slightly  into the substernal position. There is no supraclavicular  lymphadenopathy. No axillary lymphadenopathy was seen. There is a lesion  in the left breast that measures 15 mm in diameter. It does show some  increased glucose uptake with a SUV of 3.25. There is a hiatal hernia od  the stomach in the lower mediastinum. There are increased interstitial  markings noted throughout the lungs. The liver was normal in size and  shape. No focal lesions were seen in the liver. No adrenal lesions were  identified. The aorta shows atherosclerotic plaque but no aneurysmal  dilatation. There is no adenopathy in the retroperitoneum or mesentery.  No abnormal bone uptake was identified.     IMPRESSION:  The thyroid gland is enlarged, extends substernally and does  show some increased glucose uptake. There is a well circumscribed lesion  in the upper portion of the left breast. It measures 15 mm in diameter  and is hypermetabolic with an SUV value of 3.25. This would be  consistent with the patient's breast cancer. At this time, there are no  PET fusion CT findings of metastatic disease.      DEXA Bone Density Axial 02-11-22  FINDINGS:      CURRENT: The BMD measured at the Right Femur Neck is 0.706 gm/cm2 with a  T-score of -2.4.      PRIOR: The BMD measured at the Right Femur Total was 0.714 gm/cm2 with a  T-score of -2.3.         IMPRESSION:  The patient is considered to be osteopenic according to the World Health  Organization criteria. Fracture risk is moderate.  This represents  overall no significant  change in bone mineral density.        RECENT LABS:  Lab Results   Component Value Date    WBC 5.93 02/19/2022    HGB 12.1 02/19/2022    HCT 37.5 02/19/2022    MCV 89.7 02/19/2022    RDW 14.1 02/19/2022     02/19/2022    NEUTRORELPCT 59.2 02/19/2022    LYMPHORELPCT 22.6 02/19/2022    MONORELPCT 12.3 (H) 02/19/2022    EOSRELPCT 4.0 02/19/2022    BASORELPCT 1.2 02/19/2022    NEUTROABS 3.51 02/19/2022    LYMPHSABS 1.34 02/19/2022       Lab Results   Component Value Date     02/19/2022    K 3.8 02/19/2022    CO2 23.6 02/19/2022     02/19/2022    BUN 19 02/19/2022    CREATININE 0.68 02/19/2022    EGFRIFNONA 82 02/19/2022    GLUCOSE 110 (H) 02/19/2022    CALCIUM 9.7 02/19/2022    ALKPHOS 118 (H) 02/19/2022    AST 13 02/19/2022    ALT 12 02/19/2022    BILITOT 0.4 02/19/2022    ALBUMIN 4.17 02/19/2022    PROTEINTOT 6.8 02/19/2022    MG 2.1 02/19/2022    PHOS 3.5 08/19/2018     Lab Results   Component Value Date    DWWBHHNU54 386 01/12/2022    FOLATE 18.70 01/12/2022     Lab Results   Component Value Date    TSH 0.230 (L) 01/12/2022     Lab Results   Component Value Date     18.1 12/13/2021     Lab Results   Component Value Date    LABCA2 23.0 12/13/2021           ASSESSMENT & PLAN:  Jackie Erwin is a very pleasant 82 y.o. 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    1.  Left Breast Cancers:       -  Stage IIA (pT1cN1(sn)MX) moderately differentiated Invasive Ductal Carcinoma. Tumor 20 mm in maximal dimension.            1/4 SLN w/ 6 mm metastasis without extranodal extension.   5% 1+ ER+, AK-, HER-2/Milena -.       -  Stage IIA  (pT1cN1(sn)MX) moderately differentiated Invasive Lobular Carcinoma.  Tumor 18 mm in maximal dimension.          1/4 SLN w/ 6 mm metastasis without extranodal extension.  100% 3+ ER+, 80% 2-3+ AK+, HER-2/Milena 10% 2+ by IHC,            equivocal by FISH, ultimately judged to be negative.    -  Ms. Erwin is s/p successful resection  via simple L mastectomy / SLNBx.  She has recovered well from her surgery.  We previously discussed that we would often discuss consideration of Oncotype/Mammaprint, but given her age and comorbidities, I was concerned that she would not tolerate chemotherapy well. She and her daughter were in agreement.   -  Given this, I have recommended adjuvant hormonal therapy.  She started Arimidex and is tolerating it well.  -  Baseline DEXA scan (2-11-22) showed osteopenia with T score -2.3.  We discussed this at some length today.  Could  Switch Arimidex to Tamoxifen or continue Arimidex and add Ca/Vit D with or without Prolia.  After a lengthy discussion about the pros and cons to different alternatives, she decided she would like to continue Arimidex and will start Ca/Vit D. She doesn't wish to take Prolia at this time.  Will plan to repeat DEXA after a 2 year interval.      2. CHEK-2 mutation:  -  Offered referral for genetic counseling, but she didn't want to do this.  Her family members are aware and are being tested appropriately.    3. Fatigue:  -  CBCD, CMP, B12, Folate unremarkable.  -  TSH low.  Let Ms. Erwin know and sent lab to Dr. Bennett for review.  She has f/u with Dr. Bennett soon.    4.  BLE edema:  -  Mild and symmetric.   -  She requests diuretic which has helped in the past. Will give Lasix 20 mg + KCl 10 mEq daily x 2d. Suggested she f/u with  regarding this. She was previously on a diuretic chronically but it was stopped for some reason.      5.  Prophylaxis:  -  Had COVID vaccination + booster  -  Had 2021 influenza vaccination    6.  F/u:  -  Continue Arimidex  -  Pt declines Prolia. Start Ca/Vit D  -  Lasix / KCl as above for 2 days  -  F/u with  as planned  -   RTC to see me in 3 months.    This note was scribed for Radha Mills MD by Deepthi Mccarty RN.    I, Radha Mills MD, personally performed the services described in this documentation as scribed by the above named  individual in my presence, and it is both accurate and complete.  03/01/2022        I spent 25 minutes with Jackie Erwin today.  In the office today, more than 50% of this time was spent face-to-face with her  in counseling / coordination of care, reviewing her medical history and counseling on the current treatment plan.  All questions were answered to her satisfaction      Electronically Signed by: Radha Mills MD      CC:     Samuel Duane Kreis, MD Barbara A Michna, MD

## 2022-04-11 ENCOUNTER — OFFICE VISIT (OUTPATIENT)
Dept: SURGERY | Facility: CLINIC | Age: 82
End: 2022-04-11

## 2022-04-11 VITALS
WEIGHT: 202.2 LBS | HEIGHT: 64 IN | HEART RATE: 73 BPM | DIASTOLIC BLOOD PRESSURE: 79 MMHG | BODY MASS INDEX: 34.52 KG/M2 | SYSTOLIC BLOOD PRESSURE: 173 MMHG

## 2022-04-11 DIAGNOSIS — C50.812 MALIGNANT NEOPLASM OF OVERLAPPING SITES OF LEFT BREAST IN FEMALE, ESTROGEN RECEPTOR POSITIVE: Primary | ICD-10-CM

## 2022-04-11 DIAGNOSIS — Z15.02 MALIGNANT NEOPLASM OF BREAST ASSOCIATED WITH MUTATION IN CHEK2 GENE: ICD-10-CM

## 2022-04-11 DIAGNOSIS — Z15.89 MALIGNANT NEOPLASM OF BREAST ASSOCIATED WITH MUTATION IN CHEK2 GENE: ICD-10-CM

## 2022-04-11 DIAGNOSIS — Z15.09 MALIGNANT NEOPLASM OF BREAST ASSOCIATED WITH MUTATION IN CHEK2 GENE: ICD-10-CM

## 2022-04-11 DIAGNOSIS — C50.919 MALIGNANT NEOPLASM OF BREAST ASSOCIATED WITH MUTATION IN CHEK2 GENE: ICD-10-CM

## 2022-04-11 DIAGNOSIS — Z17.0 MALIGNANT NEOPLASM OF OVERLAPPING SITES OF LEFT BREAST IN FEMALE, ESTROGEN RECEPTOR POSITIVE: Primary | ICD-10-CM

## 2022-04-11 PROCEDURE — 93702 BIS XTRACELL FLUID ANALYSIS: CPT | Performed by: SURGERY

## 2022-04-11 PROCEDURE — 99213 OFFICE O/P EST LOW 20 MIN: CPT | Performed by: SURGERY

## 2022-04-11 PROCEDURE — 76882 US LMTD JT/FCL EVL NVASC XTR: CPT | Performed by: SURGERY

## 2022-04-11 NOTE — PROGRESS NOTES
Subjective   Jackie Erwin is a 82 y.o. female here today for 3 month follow up.    History of Present Illness  Ms. Erwin was seen in the office today for breast cancer follow-up.  She is status post a left mastectomy with sentinel node biopsy on 12/15/2024 2 separate primary cancers.  Patient was also found to have a Chek mutation.  She was evaluated by medical oncology and because of significant comorbidities she was placed on anastrozole which she is tolerating well.  Ms. Erwin states that she is not sleeping that great.  She does not feel well.  She is hard of it is boredom.  She denies any palpable abnormalities in the breast or chest wall.  She denies arm edema.  She denies new chest pain or shortness of breath.  Allergies   Allergen Reactions   • Morphine Shortness Of Breath       Current Outpatient Medications   Medication Sig Dispense Refill   • anastrozole (ARIMIDEX) 1 MG tablet Take 1 tablet by mouth Daily. 30 tablet 11   • aspirin 81 MG EC tablet Take 81 mg by mouth every night at bedtime.     • Calcium Carbonate-Vitamin D3 (Calcium 600+D3) 600-400 MG-UNIT tablet Take 1 tablet by mouth 2 (Two) Times a Day. 60 tablet 5   • Ibuprofen-diphenhydrAMINE HCl (Advil PM) 200-25 MG capsule Take 1 capsule by mouth Every Night.     • levothyroxine (SYNTHROID, LEVOTHROID) 150 MCG tablet Take 1 tablet by mouth Daily.     • multivitamin with minerals tablet tablet Take 1 tablet by mouth Daily.     • rosuvastatin (CRESTOR) 20 MG tablet Take 20 mg by mouth Every Night.     • sodium chloride 0.65 % nasal spray 1 spray into the nostril(s) as directed by provider As Needed for Congestion.     • valsartan (DIOVAN) 160 MG tablet Take 160 mg by mouth Daily.     • furosemide (LASIX) 20 MG tablet Take 1 tablet by mouth Daily for 2 days. 2 tablet 0   • HYDROcodone-acetaminophen (Norco) 7.5-325 MG per tablet Take 1 tablet by mouth 4 (Four) Times a Day As Needed for Moderate Pain . 15 tablet 0   • polyethyl glycol-propyl glycol  "(SYSTANE) 0.4-0.3 % solution ophthalmic solution (artificial tears) 1 drop Every 3 (Three) Hours As Needed.     • potassium chloride (K-DUR,KLOR-CON) 10 MEQ CR tablet Take 1 tablet by mouth Daily. 2 tablet 0     No current facility-administered medications for this visit.     Past Medical History:   Diagnosis Date   • Arthritis    • Arthritis    • Cancer (HCC)     LEFT BREAST   • Carotid artery disease (HCC)     Right sided, s/p Right carotid endarterectomy 2011   • Diabetes mellitus (HCC)     controlled with diet   • Diabetic neuropathy (HCC)    • Disease of thyroid gland     hypothyroidism   • Elevated cholesterol    • Hyperlipidemia    • Hypertension    • Neuropathy     LOWER LEGS AND FEET   • Peripheral neuropathy     r/t neuropathy   • Spinal headache    • Stroke (HCC)    • TIA (transient ischemic attack) 2011   • Unsteady gait when walking      Past Surgical History:   Procedure Laterality Date   • ABDOMINAL SURGERY     • APPENDECTOMY      6 yrs old   • CAROTID ENDARTERECTOMY     • CAROTID ENDARTERECTOMY Right 2011   • COLONOSCOPY     • HYSTERECTOMY      age 29 done for dysmenorrhea   • INGUINAL HERNIA REPAIR  1970   • MASTECTOMY WITH SENTINEL NODE BIOPSY AND AXILLARY NODE DISSECTION Left 12/15/2021    Procedure: BREAST MASTECTOMY WITH SENTINEL NODE BIOPSY;  Surgeon: Kaylen Saunders MD;  Location: [facility];  Service: General;  Laterality: Left;       Pertinent Review of Systems    Objective   /79 (BP Location: Left arm)   Pulse 73   Ht 162.6 cm (64\")   Wt 91.7 kg (202 lb 3.2 oz)   LMP  (LMP Unknown)   BMI 34.71 kg/m²    Physical Exam  Well-developed well-nourished female no acute distress  Neck:  No supraclavicular adenopathy  Lungs:  Respiratory effort normal. Auscultation: Clear, without wheezes, rhonchi, rales  Heart:  Regular rate and rhythm, without murmur, gallop, rub.  No pedal edema  Breasts: On visual inspection the left breast is surgically absent.  Examination of the right breast " demonstrates no discrete mass, skin change, or axillary adenopathy.  Examination of the left chest wall demonstrates a healing mastectomy scar.  Palpable mass in the upper left axilla.   Upper extremity without edema    Procedures   Ultrasound Axilla left    Indication: Palpable abnormality left axilla  Description: With use of the 12.5 MHz transducer the area of clinical concern was scanned in multiple planes.  Findings: Corresponding to the palpable abnormality is a large 3 cm seroma.  There is an incidental finding of a slightly prominent left axillary lymph node measuring approximately 1.5 cm  Impression: Palpable abnormality left axilla represents seroma.  Slightly prominent incidental left axillary lymph node  Plan: Follow-up in 4 months      L dex was performed and was -1.7, with the prior on 11/8/2021 being -3.2    Results/Data:  Imaging: Bone density result was reviewed from 2/11/2022 demonstrating osteopenia  Notes: Records from medical oncology from 3/1/2022 were reviewed.  Lab:   Other:     Assessment/Plan   Invasive ductal carcinoma, 12:00, ER low positive, NM negative, HER-2 negative.  20 mm greatest dimension  Invasive lobular carcinoma, 3:00, ER positive, NM positive, HER-2 negative.  T2N1  Positive Chek mutation -patient did not wish contralateral mastectomy  Osteopenia    Plan: Continue anastrozole  Continue calcium and vitamin D  4-month follow-up       Discussion/Summary:    Time spent:     Patient's Body mass index is 34.71 kg/m². indicating that she is obese (BMI >30). Obesity-related health conditions include the following: hypertension. Obesity is improving with lifestyle modifications. BMI is is above average; BMI management plan is completed. We discussed portion control and increasing exercise..         Future Appointments   Date Time Provider Department Center   4/11/2022  9:20 AM Kaylen Saunders MD [facility]   6/6/2022 10:00 AM [facility]   6/6/2022 10:30  AM Radha Mills MD [facility]         Please note that portions of this note were completed with a voice recognition program.

## 2022-04-25 ENCOUNTER — OFFICE VISIT (OUTPATIENT)
Dept: SURGERY | Facility: CLINIC | Age: 82
End: 2022-04-25

## 2022-04-25 VITALS
SYSTOLIC BLOOD PRESSURE: 176 MMHG | WEIGHT: 201.5 LBS | DIASTOLIC BLOOD PRESSURE: 76 MMHG | BODY MASS INDEX: 34.4 KG/M2 | HEIGHT: 64 IN | HEART RATE: 88 BPM

## 2022-04-25 DIAGNOSIS — C50.812 MALIGNANT NEOPLASM OF OVERLAPPING SITES OF LEFT BREAST IN FEMALE, ESTROGEN RECEPTOR POSITIVE: ICD-10-CM

## 2022-04-25 DIAGNOSIS — N64.89 SEROMA OF BREAST: Primary | ICD-10-CM

## 2022-04-25 DIAGNOSIS — Z17.0 MALIGNANT NEOPLASM OF OVERLAPPING SITES OF LEFT BREAST IN FEMALE, ESTROGEN RECEPTOR POSITIVE: ICD-10-CM

## 2022-04-25 PROCEDURE — 10140 I&D HMTMA SEROMA/FLUID COLLJ: CPT | Performed by: SURGERY

## 2022-04-25 PROCEDURE — 99212 OFFICE O/P EST SF 10 MIN: CPT | Performed by: SURGERY

## 2022-04-25 NOTE — PROGRESS NOTES
Subjective   Jackie Erwin is a 82 y.o. female here today for fluid check.    History of Present Illness  Ms. Erwin was seen in the office today for breast cancer follow-up.  She is status post a left mastectomy with sentinel node biopsy on 12/15/2022 with 2 separate primary cancers.  Patient was also found to have a Chek mutation.  She was evaluated by medical oncology and because of significant comorbidities she was placed on anastrozole which she is tolerating well.    At the time of her last visit she was diagnosed with a seroma and she was also noted to have appointment when she was hospitalized in February and this was aspirated to confirm that it was not infected.  Patient states that now the seroma bothers her.  Allergies   Allergen Reactions   • Morphine Shortness Of Breath       Current Outpatient Medications   Medication Sig Dispense Refill   • anastrozole (ARIMIDEX) 1 MG tablet Take 1 tablet by mouth Daily. 30 tablet 11   • aspirin 81 MG EC tablet Take 81 mg by mouth every night at bedtime.     • Calcium Carbonate-Vitamin D3 (Calcium 600+D3) 600-400 MG-UNIT tablet Take 1 tablet by mouth 2 (Two) Times a Day. 60 tablet 5   • Ibuprofen-diphenhydrAMINE HCl (Advil PM) 200-25 MG capsule Take 1 capsule by mouth Every Night.     • levothyroxine (SYNTHROID, LEVOTHROID) 150 MCG tablet Take 1 tablet by mouth Daily.     • multivitamin with minerals tablet tablet Take 1 tablet by mouth Daily.     • polyethyl glycol-propyl glycol (SYSTANE) 0.4-0.3 % solution ophthalmic solution (artificial tears) 1 drop Every 3 (Three) Hours As Needed.     • potassium chloride (K-DUR,KLOR-CON) 10 MEQ CR tablet Take 1 tablet by mouth Daily. 2 tablet 0   • rosuvastatin (CRESTOR) 20 MG tablet Take 20 mg by mouth Every Night.     • sodium chloride 0.65 % nasal spray 1 spray into the nostril(s) as directed by provider As Needed for Congestion.     • valsartan (DIOVAN) 160 MG tablet Take 160 mg by mouth Daily.     • furosemide (LASIX) 20  MG tablet Take 1 tablet by mouth Daily for 2 days. 2 tablet 0   • HYDROcodone-acetaminophen (Norco) 7.5-325 MG per tablet Take 1 tablet by mouth 4 (Four) Times a Day As Needed for Moderate Pain . 15 tablet 0     No current facility-administered medications for this visit.     Past Medical History:   Diagnosis Date   • Arthritis    • Arthritis    • Cancer (HCC)     LEFT BREAST   • Carotid artery disease (HCC)     Right sided, s/p Right carotid endarterectomy 2011   • Diabetes mellitus (HCC)     controlled with diet   • Diabetic neuropathy (HCC)    • Disease of thyroid gland     hypothyroidism   • Elevated cholesterol    • Hyperlipidemia    • Hypertension    • Neuropathy     LOWER LEGS AND FEET   • Peripheral neuropathy     r/t neuropathy   • Spinal headache    • Stroke (HCC)    • TIA (transient ischemic attack) 2011   • Unsteady gait when walking      Past Surgical History:   Procedure Laterality Date   • ABDOMINAL SURGERY     • APPENDECTOMY      6 yrs old   • CAROTID ENDARTERECTOMY     • CAROTID ENDARTERECTOMY Right 2011   • COLONOSCOPY     • HYSTERECTOMY      age 29 done for dysmenorrhea   • INGUINAL HERNIA REPAIR  1970   • MASTECTOMY WITH SENTINEL NODE BIOPSY AND AXILLARY NODE DISSECTION Left 12/15/2021    Procedure: BREAST MASTECTOMY WITH SENTINEL NODE BIOPSY;  Surgeon: Kaylen Saunders MD;  Location: [facility];  Service: General;  Laterality: Left;       Pertinent Review of Systems  Patient is not currently on blood thinners    Objective   LMP  (LMP Unknown)    Physical Exam  Left chest wall: No palpable mass.  Seroma palpable high axilla  Procedures   Procedure Note    Procedure: Ultrasound drainage of seroma    Location: Left axilla    Anesthesia: 1% lidocaine with epinephrine    Description:  The risks and benefits of the procedure were discussed.  After prep with alcohol and with ultrasound guidance an 18-gauge needle was used to aspirate the seroma.  Approximately 13 cc of clear fluid was aspirated with  minimal residual cavity.    Complications:  none    Follow-up: As needed  Results/Data:      Assessment/Plan   Invasive ductal carcinoma, 12:00, ER low positive, UT negative, HER-2 negative.  20 mm greatest dimension  Invasive lobular carcinoma, 3:00, ER positive, UT positive, HER-2 negative.  T2N1  Positive Chek mutation -patient did not wish contralateral mastectomy  Osteopenia     Plan: Continue anastrozole  Continue calcium and vitamin D  Patient is in recall for 4-month follow-up       Discussion/Summary:    Time spent:     Patient's There is no height or weight on file to calculate BMI. indicating that she is obese (BMI >30). Obesity-related health conditions include the following: hypertension. Obesity is improving with lifestyle modifications. BMI is is above average; BMI management plan is completed. We discussed portion control and increasing exercise..         Future Appointments   Date Time Provider Department Center   6/6/2022 10:00 AM [facility]   6/6/2022 10:30 AM Radha Mills MD [facility]   8/15/2022  9:30 AM Kaylen Saunders MD [facility]         Please note that portions of this note were completed with a voice recognition program.

## 2022-05-18 RX ORDER — ANASTROZOLE 1 MG/1
1 TABLET ORAL DAILY
Qty: 90 TABLET | Refills: 3 | Status: SHIPPED | OUTPATIENT
Start: 2022-05-18 | End: 2023-03-22 | Stop reason: SDUPTHER

## 2022-06-07 ENCOUNTER — LAB (OUTPATIENT)
Dept: ONCOLOGY | Facility: CLINIC | Age: 82
End: 2022-06-07

## 2022-06-07 ENCOUNTER — OFFICE VISIT (OUTPATIENT)
Dept: ONCOLOGY | Facility: CLINIC | Age: 82
End: 2022-06-07

## 2022-06-07 VITALS
SYSTOLIC BLOOD PRESSURE: 156 MMHG | TEMPERATURE: 97.1 F | HEART RATE: 75 BPM | BODY MASS INDEX: 34.78 KG/M2 | OXYGEN SATURATION: 91 % | WEIGHT: 202.6 LBS | DIASTOLIC BLOOD PRESSURE: 81 MMHG | RESPIRATION RATE: 18 BRPM

## 2022-06-07 DIAGNOSIS — E53.8 VITAMIN B12 DEFICIENCY: ICD-10-CM

## 2022-06-07 DIAGNOSIS — C50.812 MALIGNANT NEOPLASM OF OVERLAPPING SITES OF LEFT BREAST IN FEMALE, ESTROGEN RECEPTOR POSITIVE: Primary | ICD-10-CM

## 2022-06-07 DIAGNOSIS — R53.83 FATIGUE, UNSPECIFIED TYPE: ICD-10-CM

## 2022-06-07 DIAGNOSIS — Z79.899 LONG-TERM USE OF HIGH-RISK MEDICATION: ICD-10-CM

## 2022-06-07 DIAGNOSIS — Z15.89 CHEK2-RELATED BREAST CANCER: ICD-10-CM

## 2022-06-07 DIAGNOSIS — C50.919 CHEK2-RELATED BREAST CANCER: ICD-10-CM

## 2022-06-07 DIAGNOSIS — Z15.09 CHEK2-RELATED BREAST CANCER: ICD-10-CM

## 2022-06-07 DIAGNOSIS — Z17.0 MALIGNANT NEOPLASM OF OVERLAPPING SITES OF LEFT BREAST IN FEMALE, ESTROGEN RECEPTOR POSITIVE: Primary | ICD-10-CM

## 2022-06-07 DIAGNOSIS — Z15.02 CHEK2-RELATED BREAST CANCER: ICD-10-CM

## 2022-06-07 DIAGNOSIS — M85.89 OSTEOPENIA OF MULTIPLE SITES: ICD-10-CM

## 2022-06-07 LAB
ALBUMIN SERPL-MCNC: 4.01 G/DL (ref 3.5–5.2)
ALBUMIN/GLOB SERPL: 1.5 G/DL
ALP SERPL-CCNC: 102 U/L (ref 39–117)
ALT SERPL W P-5'-P-CCNC: 13 U/L (ref 1–33)
ANION GAP SERPL CALCULATED.3IONS-SCNC: 10.9 MMOL/L (ref 5–15)
AST SERPL-CCNC: 15 U/L (ref 1–32)
BASOPHILS # BLD AUTO: 0.08 10*3/MM3 (ref 0–0.2)
BASOPHILS NFR BLD AUTO: 1.3 % (ref 0–1.5)
BILIRUB SERPL-MCNC: 0.4 MG/DL (ref 0–1.2)
BUN SERPL-MCNC: 18 MG/DL (ref 8–23)
BUN/CREAT SERPL: 27.3 (ref 7–25)
CALCIUM SPEC-SCNC: 9.7 MG/DL (ref 8.6–10.5)
CHLORIDE SERPL-SCNC: 100 MMOL/L (ref 98–107)
CO2 SERPL-SCNC: 24.1 MMOL/L (ref 22–29)
CREAT SERPL-MCNC: 0.66 MG/DL (ref 0.57–1)
DEPRECATED RDW RBC AUTO: 43.1 FL (ref 37–54)
EGFRCR SERPLBLD CKD-EPI 2021: 86.6 ML/MIN/1.73
EOSINOPHIL # BLD AUTO: 0.43 10*3/MM3 (ref 0–0.4)
EOSINOPHIL NFR BLD AUTO: 6.8 % (ref 0.3–6.2)
ERYTHROCYTE [DISTWIDTH] IN BLOOD BY AUTOMATED COUNT: 13 % (ref 12.3–15.4)
GLOBULIN UR ELPH-MCNC: 2.7 GM/DL
GLUCOSE SERPL-MCNC: 120 MG/DL (ref 65–99)
HCT VFR BLD AUTO: 36.1 % (ref 34–46.6)
HGB BLD-MCNC: 12 G/DL (ref 12–15.9)
IMM GRANULOCYTES # BLD AUTO: 0.02 10*3/MM3 (ref 0–0.05)
IMM GRANULOCYTES NFR BLD AUTO: 0.3 % (ref 0–0.5)
LYMPHOCYTES # BLD AUTO: 1.54 10*3/MM3 (ref 0.7–3.1)
LYMPHOCYTES NFR BLD AUTO: 24.2 % (ref 19.6–45.3)
MCH RBC QN AUTO: 29.6 PG (ref 26.6–33)
MCHC RBC AUTO-ENTMCNC: 33.2 G/DL (ref 31.5–35.7)
MCV RBC AUTO: 88.9 FL (ref 79–97)
MONOCYTES # BLD AUTO: 0.78 10*3/MM3 (ref 0.1–0.9)
MONOCYTES NFR BLD AUTO: 12.3 % (ref 5–12)
NEUTROPHILS NFR BLD AUTO: 3.51 10*3/MM3 (ref 1.7–7)
NEUTROPHILS NFR BLD AUTO: 55.1 % (ref 42.7–76)
NRBC BLD AUTO-RTO: 0 /100 WBC (ref 0–0.2)
PLATELET # BLD AUTO: 283 10*3/MM3 (ref 140–450)
PMV BLD AUTO: 9.2 FL (ref 6–12)
POTASSIUM SERPL-SCNC: 3.7 MMOL/L (ref 3.5–5.2)
PROT SERPL-MCNC: 6.7 G/DL (ref 6–8.5)
RBC # BLD AUTO: 4.06 10*6/MM3 (ref 3.77–5.28)
SODIUM SERPL-SCNC: 135 MMOL/L (ref 136–145)
WBC NRBC COR # BLD: 6.36 10*3/MM3 (ref 3.4–10.8)

## 2022-06-07 PROCEDURE — 85025 COMPLETE CBC W/AUTO DIFF WBC: CPT | Performed by: NURSE PRACTITIONER

## 2022-06-07 PROCEDURE — 99213 OFFICE O/P EST LOW 20 MIN: CPT | Performed by: NURSE PRACTITIONER

## 2022-06-07 PROCEDURE — 80053 COMPREHEN METABOLIC PANEL: CPT | Performed by: NURSE PRACTITIONER

## 2022-06-07 NOTE — PROGRESS NOTES
NAME: Jackie Erwin    : 1940    DATE:  22    DIAGNOSIS:   Left Breast Cancers:    -  Stage IIA (pT1cN1(sn)MX) moderately differentiated Invasive Ductal Carcinoma.             Tumor 20 mm in maximal dimension.              1/4 SLN w/ 6 mm metastasis without extranodal extension.                5% 1+ ER+, UT-, HER-2/Milena -.    -  Stage IIA  (pT1cN1(sn)MX) moderately differentiated Invasive Lobular Carcinoma.               Tumor 18 mm in maximal dimension.             1/4 SLN w/ 6 mm metastasis without extranodal extension.               100% 3+ ER+, 80% 2-3+ UT+, HER-2/Milena 10% 2+ by IHC,            equivocal by FISH, ultimately judged to be negative.    CHIEF COMPLAINT:  Follow up of Breast Cancer     HISTORY OF PRESENT ILLNESS:   Jackie Erwin is a very pleasant 82 y.o. female who is being seen today at the request of No ref. provider found for evaluation and treatment of newly diagnosed breast cancer.  She says she noticed a lump in her L breast and so presented for further evaluation. She saw her PCP and was referred for imaging and biopsy and then saw Dr. Saunders.  She had biopsies of 2 distincet areas in the L breast.  The first showed IDC Grade 2 which was weakly ER+ and UT/HER-2 negative.  The other was an ILC which was strongly ER/UT + and HER2 equivocal by IHC/FISH but ultimately judged to be negative.   On 12/15/21, Dr. Saunders took her for L simple mastectomy.  Final pathology also showed two separate tumors as below.  1/4 SLN showed a 6 mm metastasis without extranodal extension.      Ms. Erwin is currently healing well and denies any specific complaints. She reports having a post op fever of 102.4 and was taken to [facility] for evaluation. She completed 2 doses of IV antibiotics and no identifiable cause was found and she has remained afebrile. She has completed a course of both oral Keflex and oral Bactrim.  She hasn't had further fevers.  She complains of fatigue which has  worsened since her surgery but has started walking again.  She lost about 5 lbs but weight has since been stable.  She has frequent chronic cough (since developing [place] in [place] in 1986) but this is no worse than her baseline..  She denies chest pain or shortness of breath.  She denies abdominal pain, nausea/vomiting/diarrha.  She has on/off constipation.  No rectal bleeding.  She is otherwise well and without complaint.    INTERVAL HISTORY:   Ms. Erwin is here today for follow up of breast cancer. Since her last visit, she reports she has been doing well. She remains on Arimidex and is tolerating it well with no noticeable side effects. She is taking Ca/Vit D as advised and again wishes to hold off on Prolia therapy. She reports improvement in BLE swelling. She has chronic fatigue which is stable. She says she will follow up with PCP in July. She has no other complaints today.     PAST MEDICAL HISTORY:  Past Medical History:   Diagnosis Date   • Arthritis    • Arthritis    • Cancer (HCC)     LEFT BREAST   • Carotid artery disease (HCC)     Right sided, s/p Right carotid endarterectomy 2011   • Diabetes mellitus (HCC)     controlled with diet   • Diabetic neuropathy (HCC)    • Disease of thyroid gland     hypothyroidism   • Elevated cholesterol    • Hyperlipidemia    • Hypertension    • Neuropathy     LOWER LEGS AND FEET   • Peripheral neuropathy     r/t neuropathy   • Spinal headache    • Stroke (HCC)    • TIA (transient ischemic attack) 2011   • Unsteady gait when walking        PAST SURGICAL HISTORY:  Past Surgical History:   Procedure Laterality Date   • ABDOMINAL SURGERY     • APPENDECTOMY      6 yrs old   • CAROTID ENDARTERECTOMY     • CAROTID ENDARTERECTOMY Right 2011   • COLONOSCOPY     • HYSTERECTOMY      age 29 done for dysmenorrhea   • INGUINAL HERNIA REPAIR  1970   • MASTECTOMY WITH SENTINEL NODE BIOPSY AND AXILLARY NODE DISSECTION Left 12/15/2021    Procedure: BREAST MASTECTOMY WITH SENTINEL  NODE BIOPSY;  Surgeon: Kaylen Saunders MD;  Location: [facility];  Service: General;  Laterality: Left;       FAMILY HISTORY:  Family History   Problem Relation Age of Onset   • Hypertension Mother    • Thrombosis Father    • Diabetes Maternal Grandmother    • Cancer Maternal Aunt         ?breast cancer, had mastectomy   • Cancer Maternal Cousin         colon   • Cancer Maternal Cousin         colon   • Cancer Maternal Cousin         colon   • Cancer Maternal Cousin         colon   • Breast cancer Neg Hx    2 siblings no cancer  4 other maternal aunts/uncles no cancer    SOCIAL HISTORY:  Social History     Socioeconomic History   • Marital status:    Tobacco Use   • Smoking status: Former Smoker     Packs/day: 0.50     Years: 20.00     Pack years: 10.00     Types: Cigarettes     Quit date:      Years since quittin.4   • Smokeless tobacco: Never Used   Vaping Use   • Vaping Use: Never used   Substance and Sexual Activity   • Alcohol use: No   • Drug use: No   • Sexual activity: Defer     Social History     Social History Narrative    lives in an assisted living facility [facility]    Worked in an office setting, 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  Significant findings as mentioned above.  All other systems reviewed and are negative.      MEDICATIONS:  The current medication list was reviewed in the EMR    Current Outpatient Medications:   •  anastrozole (ARIMIDEX) 1 MG tablet, Take 1 tablet by mouth Daily., Disp: 90 tablet, Rfl: 3  •  aspirin 81 MG EC tablet, Take 81 mg by mouth every night at bedtime., Disp: , Rfl:   •  Calcium Carbonate-Vitamin D3 (Calcium 600+D3) 600-400 MG-UNIT tablet, Take 1 tablet by mouth 2 (Two) Times a Day., Disp: 60 tablet, Rfl: 5  •  HYDROcodone-acetaminophen (Norco) 7.5-325 MG per tablet, Take 1 tablet by mouth 4 (Four) Times a Day As Needed for  Moderate Pain ., Disp: 15 tablet, Rfl: 0  •  Ibuprofen-diphenhydrAMINE HCl (Advil PM) 200-25 MG capsule, Take 1 capsule by mouth Every Night., Disp: , Rfl:   •  levothyroxine (SYNTHROID, LEVOTHROID) 150 MCG tablet, Take 1 tablet by mouth Daily., Disp: , Rfl:   •  multivitamin with minerals tablet tablet, Take 1 tablet by mouth Daily., Disp: , Rfl:   •  polyethyl glycol-propyl glycol (SYSTANE) 0.4-0.3 % solution ophthalmic solution (artificial tears), 1 drop Every 3 (Three) Hours As Needed., Disp: , Rfl:   •  potassium chloride (K-DUR,KLOR-CON) 10 MEQ CR tablet, Take 1 tablet by mouth Daily., Disp: 2 tablet, Rfl: 0  •  rosuvastatin (CRESTOR) 20 MG tablet, Take 20 mg by mouth Every Night., Disp: , Rfl:   •  sodium chloride 0.65 % nasal spray, 1 spray into the nostril(s) as directed by provider As Needed for Congestion., Disp: , Rfl:   •  valsartan (DIOVAN) 160 MG tablet, Take 160 mg by mouth Daily., Disp: , Rfl:   •  furosemide (LASIX) 20 MG tablet, Take 1 tablet by mouth Daily for 2 days., Disp: 2 tablet, Rfl: 0    ALLERGIES:    Allergies   Allergen Reactions   • Morphine Shortness Of Breath       PHYSICAL EXAM:  Vitals:    06/07/22 0944   BP: 156/81   Pulse: 75   Resp: 18   Temp: 97.1 °F (36.2 °C)   SpO2: 91%     Pain Score    06/07/22 0944   PainSc: 0-No pain     ECOG score: 0   General:  Awake, alert and oriented, in no distress  HEENT:  Pupils are equal, round and reactive to light and accommodation, Extra-ocular movements full, Oropharyx clear, mucous membranes moist  Neck:  No JVD, thyromegaly or lymphadenopathy  CV:  RRR, no murmurs, rubs or gallops  Resp:  Lungs are clear to auscultation bilaterally  Abd:  Soft, non-tender, non-distended, bowel sounds present, no organomegaly or masses  Breast:  S/p L mastectomy.  Surgical incisions smooth and intact.  R breast without palpable mass/lesions. No axillary adenopathy on either side.  Ext:  No clubbing, cyanosis, edema  Lymph:  No cervical, supraclavicular,  axillary, adenopathy  Neuro:  MS as above,  grossly non-focal exam      PATHOLOGY:  11-04-21            Genetic testing 11-08-21              12-15-21            ENDOSCOPY:        IMAGING:  Mammo Diagnostic Digital Tomosynthesis Bilateral With CAD with US Breast Left Limited 11-04-21  FINDINGS:  There are scattered areas of fibroglandular density.     Right breast: The fibroglandular pattern is unremarkable in appearance.  There are scattered benign-appearing calcifications. No dominant mass,  suspicious calcifications, or areas of architectural distortion are  seen.     Left breast: Correlating with the area the patient is feeling in the  left 12:00 to 1:00 region is an approximately 2.1 cm irregular isodense  mass with associated architectural distortion and coarse heterogeneous  calcifications. In the posterior left 3:00 region there is a second  irregular isodense mass containing faint calcifications. This area is  less discrete mammographically. Other scattered calcifications in the  left breast are noted. The remaining fibroglandular pattern of the left  breast is unremarkable.     Left breast ultrasound: Focused sonographic evaluation of the left  breast demonstrates a 2.0 cm irregular hypoechoic mass located at 12:00,  6 cm from the nipple, as well as a 1.9 cm irregular hypoechoic mass  located at 3:00, 6 cm from the nipple. Both are suspicious for  malignancy. Ultrasound of the left axilla demonstrates no abnormal  appearing axillary lymph nodes.     IMPRESSION:  1. Benign right mammographic findings.     2. 2.0 cm mass in the left 12:00 region and 1.9 cm mass in the left 3:00  region which are both suspicious for malignancy. Recommend  ultrasound-guided core biopsy.        BI-RADS CATEGORY:  5, HIGHLY SUGGESTIVE OF MALIGNANCY        RECOMMENDATION:  Recommend ultrasound-guided core biopsy of masses in  the left 12:00 and left 3:00 regions.     CAD was utilized.        NM PET/CT Skull Base to Mid Thigh  11-19-21  PET/CT FINDINGS: The images obtained from the base of the skull to the  thoracic inlet showed no enlarged lymph nodes or masses in the neck.  There were no areas of increased glucose uptake. At the thoracic inlet,  the thyroid gland is enlarged. There is increased uptake in both the  left and right lobes of the thyroid gland, which does extend slightly  into the substernal position. There is no supraclavicular  lymphadenopathy. No axillary lymphadenopathy was seen. There is a lesion  in the left breast that measures 15 mm in diameter. It does show some  increased glucose uptake with a SUV of 3.25. There is a hiatal hernia od  the stomach in the lower mediastinum. There are increased interstitial  markings noted throughout the lungs. The liver was normal in size and  shape. No focal lesions were seen in the liver. No adrenal lesions were  identified. The aorta shows atherosclerotic plaque but no aneurysmal  dilatation. There is no adenopathy in the retroperitoneum or mesentery.  No abnormal bone uptake was identified.     IMPRESSION:  The thyroid gland is enlarged, extends substernally and does  show some increased glucose uptake. There is a well circumscribed lesion  in the upper portion of the left breast. It measures 15 mm in diameter  and is hypermetabolic with an SUV value of 3.25. This would be  consistent with the patient's breast cancer. At this time, there are no  PET fusion CT findings of metastatic disease.      DEXA Bone Density Axial 02-11-22  FINDINGS:      CURRENT: The BMD measured at the Right Femur Neck is 0.706 gm/cm2 with a  T-score of -2.4.      PRIOR: The BMD measured at the Right Femur Total was 0.714 gm/cm2 with a  T-score of -2.3.         IMPRESSION:  The patient is considered to be osteopenic according to the World Health  Organization criteria. Fracture risk is moderate.  This represents  overall no significant change in bone mineral density.    RECENT LABS:  Lab Results    Component Value Date    WBC 6.36 06/07/2022    HGB 12.0 06/07/2022    HCT 36.1 06/07/2022    MCV 88.9 06/07/2022    RDW 13.0 06/07/2022     06/07/2022    NEUTRORELPCT 55.1 06/07/2022    LYMPHORELPCT 24.2 06/07/2022    MONORELPCT 12.3 (H) 06/07/2022    EOSRELPCT 6.8 (H) 06/07/2022    BASORELPCT 1.3 06/07/2022    NEUTROABS 3.51 06/07/2022    LYMPHSABS 1.54 06/07/2022       Lab Results   Component Value Date     (L) 06/07/2022    K 3.7 06/07/2022    CO2 24.1 06/07/2022     06/07/2022    BUN 18 06/07/2022    CREATININE 0.66 06/07/2022    EGFRIFNONA 82 02/19/2022    GLUCOSE 120 (H) 06/07/2022    CALCIUM 9.7 06/07/2022    ALKPHOS 102 06/07/2022    AST 15 06/07/2022    ALT 13 06/07/2022    BILITOT 0.4 06/07/2022    ALBUMIN 4.01 06/07/2022    PROTEINTOT 6.7 06/07/2022    MG 2.1 02/19/2022    PHOS 3.5 08/19/2018     Lab Results   Component Value Date    JRXQZJKN48 386 01/12/2022    FOLATE 18.70 01/12/2022     Lab Results   Component Value Date    TSH 0.230 (L) 01/12/2022     Lab Results   Component Value Date     18.1 12/13/2021     Lab Results   Component Value Date    LABCA2 23.0 12/13/2021       ASSESSMENT & PLAN:  Jackie Erwin is a very pleasant 82 y.o. 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    1.  Left Breast Cancers:       -  Stage IIA (pT1cN1(sn)MX) moderately differentiated Invasive Ductal Carcinoma. Tumor 20 mm in maximal dimension.            1/4 SLN w/ 6 mm metastasis without extranodal extension.   5% 1+ ER+, VA-, HER-2/Milena -.       -  Stage IIA  (pT1cN1(sn)MX) moderately differentiated Invasive Lobular Carcinoma.  Tumor 18 mm in maximal dimension.          1/4 SLN w/ 6 mm metastasis without extranodal extension.  100% 3+ ER+, 80% 2-3+ VA+, HER-2/Milena 10% 2+ by IHC,            equivocal by FISH, ultimately judged to be negative.    -  Ms. Erwin is s/p successful resection via simple L mastectomy / SLNBx. We previously discussed that we  would often discuss consideration of Oncotype/Mammaprint, but given her age and comorbidities, concerned that she would not tolerate chemotherapy well. She and her daughter were in agreement.   -  Given this,  recommended adjuvant hormonal therapy.  She started Arimidex and is tolerating it well.  -  Baseline DEXA scan (2-11-22) showed osteopenia with T score -2.3.  We discussed this at length.  Could  Switch Arimidex to Tamoxifen or continue Arimidex and add Ca/Vit D with or without Prolia.  After a lengthy discussion about the pros and cons to different alternatives, she decided she would like to continue Arimidex and will start Ca/Vit D. She declined Prolia therapy. Therefore, will plan to repeat DEXA after a 2 year interval.    -At present, clinically she continues to do well. Exam/labs without cause for concern. Will continue with Arimidex and Ca/Vit D.  -She will need repeat right mammogram in November 2022.  -She will follow up with MD in 3 months with repeat labs.     2. CHEK-2 mutation:  -  Offered referral for genetic counseling, but she didn't want to do this.  Her family members are aware and are being tested appropriately.    3. Fatigue:  -Chronic/stable.   -  CBCD, CMP, B12, Folate unremarkable.  -  TSH was previously low and Ms. Erwin was advised to follow up with PCP (has upcoming appointment in July).    4.  BLE edema:  -  Previously mild and symmetric. Currently resolved.   -  She was previously given Lasix 20 mg + KCl 10 mEq daily x 2d which was helpful.  She reports walking for exercise recently which has also been helpful. Follow up with PCP as previously planned.      5.  Prophylaxis:  -  Had COVID vaccination + booster  -  Had 2021 influenza vaccination    6.  F/u:  -  Continue Arimidex and Ca/Vit D. (Declined Prolia)   -MD follow up in 3 months with labs.     ACO / NICHOLE/Other  Quality measures  -  Jackie Erwin received 2021 flu vaccine.  -  Jackie Erwin reports a pain score of 0.    -   Current outpatient and discharge medications have been reconciled for the patient.  Reviewed by: DANIEL Dozier      I spent 25 minutes with Jackie Erwin today.  In the office today, more than 50% of this time was spent face-to-face with her  in counseling / coordination of care, reviewing her medical history and counseling on the current treatment plan.  All questions were answered to her satisfaction      Electronically Signed by: DANIEL See    CC:     Samuel Duane Kreis, MD Barbara A Michna, MD

## 2022-07-18 NOTE — PROGRESS NOTES
"Placed call to mom Shelly/call disconnected prior to transfer from Central [facility].     Reporting symptoms of sore throat/nasal congestion starting 7/14/22.    \"He is still eating and drinking.\"    Reporting Ibuprofen improves sore throat.     Sleeping through night.    COVID 19 home testing negative.      Afebrile.     Denies difficulty breathing other then nasal congestion.     Disposition to see in clinic today or tomorrow.     Transferred to Central Scheduling.    Penny Hernandez RN  [facility]        COVID 19 Nurse Triage Plan/Patient Instructions    Please be aware that novel coronavirus (COVID-19) may be circulating in the community. If you develop symptoms such as fever, cough, or SOB or if you have concerns about the presence of another infection including coronavirus (COVID-19), please contact your health care provider or visit [email or web address].     Disposition/Instructions    In-Person Visit with provider recommended. Reference Visit Selection Guide.    Thank you for taking steps to prevent the spread of this virus.  o Limit your contact with others.  o Wear a simple mask to cover your cough.  o Wash your hands well and often.    Resources    [facility]: About COVID-19: [email or web address]    CDC: What to Do If You're Sick: www.cdc.gov/coronavirus/2019-ncov/about/steps-when-sick.html    CDC: Ending Home Isolation: www.cdc.gov/coronavirus/2019-ncov/hcp/disposition-in-home-patients.html     CDC: Caring for Someone: www.cdc.gov/coronavirus/2019-ncov/if-you-are-sick/care-for-someone.html     [facility]: Interim Guidance for Hospital Discharge to Home: www.health.[facility].mn.us/diseases/coronavirus/hcp/hospdischarge.pdf    [facility] clinical trials (COVID-19 research studies): [email or web address]     Below are the COVID-19 hotlines at the [place] [facility] ([facility]). Interpreters are available.   o For health questions: Call " "Subjective   Jackie Erwin is a 81 y.o. female is here today for follow-up.    History of Present Illness  Ms. Erwin was seen in the office today for her second postoperative visit following a left mastectomy with sentinel node biopsy on 12/15/2020.  Final pathology was not available at the time of her last visit.  Because of the high KARINA output the KARINA drain was left in last week.  Patient states she feels that she is doing well.  Allergies   Allergen Reactions   • Morphine Shortness Of Breath         Current Outpatient Medications   Medication Sig Dispense Refill   • aspirin 81 MG EC tablet Take 81 mg by mouth every night at bedtime.     • HYDROcodone-acetaminophen (Norco) 7.5-325 MG per tablet Take 1 tablet by mouth 4 (Four) Times a Day As Needed for Moderate Pain . 15 tablet 0   • Ibuprofen-diphenhydrAMINE HCl (Advil PM) 200-25 MG capsule Take 1 capsule by mouth Every Night.     • levothyroxine (SYNTHROID, LEVOTHROID) 150 MCG tablet Take 1 tablet by mouth Daily.     • multivitamin with minerals (OCUVITE EYE + MULTI PO) Take 1 tablet by mouth Daily.     • polyethyl glycol-propyl glycol (SYSTANE) 0.4-0.3 % solution ophthalmic solution (artificial tears) 1 drop Every 3 (Three) Hours As Needed.     • rosuvastatin (CRESTOR) 20 MG tablet Take 20 mg by mouth Every Night.     • sodium chloride 0.65 % nasal spray 1 spray into the nostril(s) as directed by provider As Needed for Congestion.     • valsartan (DIOVAN) 160 MG tablet Take 160 mg by mouth Daily.       No current facility-administered medications for this visit.       Objective   /83   Pulse 78   Temp 96.6 °F (35.9 °C)   Ht 162.6 cm (64\")   Wt 90.7 kg (200 lb)   LMP  (LMP Unknown)   BMI 34.33 kg/m²    Physical Exam  Left chest: Healing mastectomy scar without hematoma or seroma.  After review of KARINA output the KARINA drain was removed    Results/Data  Pathology result was reviewed and discussed with the patient.  1 of 6 lymph nodes with 6 mm focus of " [phone] or [phone] (7 a.m. to 7 p.m.)  o For questions about schools and childcare: Call [phone] or [phone] (7 a.m. to 7 p.m.)                       Reason for Disposition    Sore throat with fever is the main symptom and present > 48 hours    Additional Information    Negative: Severe difficulty breathing (struggling for each breath, making grunting noises with each breath, unable to speak or cry because of difficulty breathing, severe retractions)    Negative: Bluish (or gray) lips or face now    Negative: Sounds like a life-threatening emergency to the triager    Negative: Croup is main symptom (Reason: a throat culture is probably not needed)    Negative: Cough is main symptom (Reason: a throat culture is probably not needed)    Negative: Runny nose is the main symptom  (Reason: a throat culture is probably not needed)    Negative: Age < 2 years and fluid intake is decreased    Negative: Can't move neck normally and fever    Negative: Drooling or spitting out saliva (because can't swallow)    Negative: Fever and weak immune system (sickle cell disease, HIV, chemotherapy, organ transplant, chronic steroids, etc)    Negative: Difficulty breathing (per caller), but not severe    Negative: Child sounds very sick or weak to the triager    Negative: Can't move neck normally but no fever    Negative: Complains that can't open mouth normally (without being asked)    Negative: Fever > 105 F (40.6 C)    Negative: Dehydration suspected (very dry mouth, no tears with crying and no urine for > 12 hours)    Negative: Sore throat pain is SEVERE and not improved after 2 hours of pain medicine    Negative: Age < 2 years old    Negative: Rash that's widespread    Negative: Cloudy discharge from ear canal    Negative: Fever present > 3 days    Negative: Fever returns after going away > 24 hours and symptoms worse or not improved    Protocols used: SORE THROAT-P-OH       metastatic lobular carcinoma    Procedures     Assessment/Plan   Invasive ductal carcinoma, 12:00, ER low positive, HI negative, HER-2 negative.  20 mm greatest dimension  Invasive lobular carcinoma, 3:00, ER positive, HI positive, HER-2 negative.  T2N1  Positive Chek mutation -patient did not wish contralateral mastectomy    Plan: Follow-up with me in 2 weeks  Patient has scheduled consultation with medical oncology on 1/12/22       Discussion/Summary  Patient's Body mass index is 34.33 kg/m². indicating that she is obese (BMI >30). Obesity-related health conditions include the following: none. Obesity is unchanged. BMI is is above average; no BMI management plan is appropriate. We discussed portion control and increasing exercise..       Future Appointments   Date Time Provider Department Center   12/27/2021 10:00 AM Kaylen Saunders MD [facility]   1/12/2022  1:00 PM Radha Mills MD [facility]         Please note that portions of this note were completed with a voice recognition program.

## 2022-08-15 ENCOUNTER — OFFICE VISIT (OUTPATIENT)
Dept: SURGERY | Facility: CLINIC | Age: 82
End: 2022-08-15

## 2022-08-15 VITALS
HEART RATE: 79 BPM | WEIGHT: 203.2 LBS | DIASTOLIC BLOOD PRESSURE: 68 MMHG | BODY MASS INDEX: 34.69 KG/M2 | SYSTOLIC BLOOD PRESSURE: 162 MMHG | HEIGHT: 64 IN

## 2022-08-15 DIAGNOSIS — C50.812 MALIGNANT NEOPLASM OF OVERLAPPING SITES OF LEFT BREAST IN FEMALE, ESTROGEN RECEPTOR POSITIVE: Primary | ICD-10-CM

## 2022-08-15 DIAGNOSIS — R07.81 RIB PAIN ON LEFT SIDE: ICD-10-CM

## 2022-08-15 DIAGNOSIS — M85.851 OSTEOPENIA OF NECK OF RIGHT FEMUR: ICD-10-CM

## 2022-08-15 DIAGNOSIS — Z17.0 MALIGNANT NEOPLASM OF OVERLAPPING SITES OF LEFT BREAST IN FEMALE, ESTROGEN RECEPTOR POSITIVE: Primary | ICD-10-CM

## 2022-08-15 PROCEDURE — 93702 BIS XTRACELL FLUID ANALYSIS: CPT | Performed by: SURGERY

## 2022-08-15 PROCEDURE — 99214 OFFICE O/P EST MOD 30 MIN: CPT | Performed by: SURGERY

## 2022-08-15 NOTE — PROGRESS NOTES
Subjective   Jackie Erwin is a 82 y.o. female here today for 4 month follow up.    History of Present Illness  Ms. Erwin was seen in the office today for breast cancer follow-up.  She is status post a left mastectomy with sentinel node biopsy on 12/15/2022 with 2 separate primary cancers.  Patient was also found to have a Chek 2 mutation.  She was evaluated by medical oncology and because of significant comorbidities she was placed on anastrozole which she is tolerating well.    At the time of her last visit she noted to have an axillary seroma.  This was actually aspirated but was not infected.  She states she does have some discomfort in the axilla.  Patient also reports new chest wall pain in the left lateral ribs.  She denies any history of trauma to this area.  She also reports feeling dizzy which she attributes to possible hypoglycemia.  Lastly the patient states she has had severe pain in her lower legs for the last 2 weeks and states that because of the barely walk.  She denies any new palpable abnormalities in the breast.  She denies arm edema.    Most recent bone density was 2/11/2022 which demonstrated a T score of -2.4    Allergies   Allergen Reactions   • Morphine Shortness Of Breath       Current Outpatient Medications   Medication Sig Dispense Refill   • anastrozole (ARIMIDEX) 1 MG tablet Take 1 tablet by mouth Daily. 90 tablet 3   • aspirin 81 MG EC tablet Take 81 mg by mouth every night at bedtime.     • Calcium Carbonate-Vitamin D3 (Calcium 600+D3) 600-400 MG-UNIT tablet Take 1 tablet by mouth 2 (Two) Times a Day. 60 tablet 5   • Ibuprofen-diphenhydrAMINE HCl (Advil PM) 200-25 MG capsule Take 1 capsule by mouth Every Night.     • levothyroxine (SYNTHROID, LEVOTHROID) 150 MCG tablet Take 1 tablet by mouth Daily.     • multivitamin with minerals tablet tablet Take 1 tablet by mouth Daily.     • polyethyl glycol-propyl glycol (SYSTANE) 0.4-0.3 % solution ophthalmic solution (artificial tears) 1 drop  "Every 3 (Three) Hours As Needed.     • potassium chloride (K-DUR,KLOR-CON) 10 MEQ CR tablet Take 1 tablet by mouth Daily. 2 tablet 0   • rosuvastatin (CRESTOR) 20 MG tablet Take 20 mg by mouth Every Night.     • sodium chloride 0.65 % nasal spray 1 spray into the nostril(s) as directed by provider As Needed for Congestion.     • valsartan (DIOVAN) 160 MG tablet Take 160 mg by mouth Daily.     • furosemide (LASIX) 20 MG tablet Take 1 tablet by mouth Daily for 2 days. 2 tablet 0   • HYDROcodone-acetaminophen (Norco) 7.5-325 MG per tablet Take 1 tablet by mouth 4 (Four) Times a Day As Needed for Moderate Pain . 15 tablet 0     No current facility-administered medications for this visit.     Past Medical History:   Diagnosis Date   • Arthritis    • Arthritis    • Cancer (HCC)     LEFT BREAST   • Carotid artery disease (HCC)     Right sided, s/p Right carotid endarterectomy 2011   • Diabetes mellitus (HCC)     controlled with diet   • Diabetic neuropathy (HCC)    • Disease of thyroid gland     hypothyroidism   • Elevated cholesterol    • Hyperlipidemia    • Hypertension    • Neuropathy     LOWER LEGS AND FEET   • Peripheral neuropathy     r/t neuropathy   • Spinal headache    • Stroke (HCC)    • TIA (transient ischemic attack) 2011   • Unsteady gait when walking      Past Surgical History:   Procedure Laterality Date   • ABDOMINAL SURGERY     • APPENDECTOMY      6 yrs old   • CAROTID ENDARTERECTOMY     • CAROTID ENDARTERECTOMY Right 2011   • COLONOSCOPY     • HYSTERECTOMY      age 29 done for dysmenorrhea   • INGUINAL HERNIA REPAIR  1970   • MASTECTOMY WITH SENTINEL NODE BIOPSY AND AXILLARY NODE DISSECTION Left 12/15/2021    Procedure: BREAST MASTECTOMY WITH SENTINEL NODE BIOPSY;  Surgeon: Kaylen Saunders MD;  Location: [facility];  Service: General;  Laterality: Left;       Pertinent Review of Systems  Unchanged with the exception of history of present illness.    Objective   Ht 162.6 cm (64\")   Wt 92.2 kg (203 lb " 3.2 oz)   LMP  (LMP Unknown)   BMI 34.88 kg/m²    Physical Exam  Well-developed well-nourished female no acute distress  Neck:  No supraclavicular adenopathy  Lungs:  Respiratory effort normal. Auscultation: Clear, without wheezes, rhonchi, rales  Heart:  Regular rate and rhythm, without murmur, gallop, rub.  No pedal edema  Breasts: On visual inspection the left.  Examination of the right breast demonstrates no discrete mass, skin change, or axillary adenopathy.  Examination of the left chest wall demonstrates some tenderness over the mastectomy site.  There is a fullness high in the left axilla at the site of known seroma.  Inferior to the axilla laterally in the ribs there is tenderness to palpation..   Upper extremity: Without edema    Procedures   L dex was performed and was 1.1, with the prior on 4/11/2022 being -1.7  Results/Data:  Records from medical oncology from 6/7/2022 were reviewed    Assessment & Plan   Invasive ductal carcinoma, 12:00, ER low positive, AZ negative, HER-2 negative.  20 mm greatest dimension  Invasive lobular carcinoma, 3:00, ER positive, AZ positive, HER-2 negative.  T2N1  Positive Chek mutation -patient did not wish contralateral mastectomy  Osteopenia  Increasing leg pain  New left rib pain with no history of trauma plan:    Patient will be scheduled for a bone scan and the result will be tracked.  Stop anastrozole for 1 month and reevaluate if there is an improvement in the patient's bone pain  Keep scheduled follow-up with medical oncology for 9/6/2022  Patient will be scheduled for a right mammogram in November, 2022 with return to the office following       Discussion/Summary:    Time spent:     BMI is >= 30 and <35. (Class 1 Obesity). The following options were offered after discussion;: nutrition counseling/recommendations         Future Appointments   Date Time Provider Department Center   9/6/2022  9:45 AM [facility] NUR[facility]   9/6/2022 10:00 AM Gene  Radha LEIGH MD [facility]         Please note that portions of this note were completed with a voice recognition program.

## 2022-08-16 PROBLEM — M85.851 OSTEOPENIA OF NECK OF RIGHT FEMUR: Status: ACTIVE | Noted: 2022-08-16

## 2022-08-16 PROBLEM — R07.81 RIB PAIN ON LEFT SIDE: Status: ACTIVE | Noted: 2022-08-16

## 2022-09-05 DIAGNOSIS — M85.89 OSTEOPENIA OF MULTIPLE SITES: Primary | ICD-10-CM

## 2022-09-06 DIAGNOSIS — M85.89 OSTEOPENIA OF MULTIPLE SITES: ICD-10-CM

## 2022-09-06 DIAGNOSIS — R53.83 FATIGUE, UNSPECIFIED TYPE: ICD-10-CM

## 2022-09-06 DIAGNOSIS — Z17.0 MALIGNANT NEOPLASM OF OVERLAPPING SITES OF LEFT BREAST IN FEMALE, ESTROGEN RECEPTOR POSITIVE: Primary | ICD-10-CM

## 2022-09-06 DIAGNOSIS — C50.812 MALIGNANT NEOPLASM OF OVERLAPPING SITES OF LEFT BREAST IN FEMALE, ESTROGEN RECEPTOR POSITIVE: Primary | ICD-10-CM

## 2022-11-15 ENCOUNTER — HOSPITAL ENCOUNTER (OUTPATIENT)
Dept: MAMMOGRAPHY | Facility: HOSPITAL | Age: 82
Discharge: HOME | End: 2022-11-15
Admitting: INTERNAL MEDICINE

## 2022-11-15 ENCOUNTER — LAB (OUTPATIENT)
Dept: ONCOLOGY | Facility: CLINIC | Age: 82
End: 2022-11-15

## 2022-11-15 DIAGNOSIS — Z17.0 MALIGNANT NEOPLASM OF OVERLAPPING SITES OF LEFT BREAST IN FEMALE, ESTROGEN RECEPTOR POSITIVE: ICD-10-CM

## 2022-11-15 DIAGNOSIS — Z17.0 MALIGNANT NEOPLASM OF OVERLAPPING SITES OF LEFT BREAST IN FEMALE, ESTROGEN RECEPTOR POSITIVE: Primary | ICD-10-CM

## 2022-11-15 DIAGNOSIS — C50.812 MALIGNANT NEOPLASM OF OVERLAPPING SITES OF LEFT BREAST IN FEMALE, ESTROGEN RECEPTOR POSITIVE: Primary | ICD-10-CM

## 2022-11-15 DIAGNOSIS — C50.812 MALIGNANT NEOPLASM OF OVERLAPPING SITES OF LEFT BREAST IN FEMALE, ESTROGEN RECEPTOR POSITIVE: ICD-10-CM

## 2022-11-15 LAB
ALBUMIN SERPL-MCNC: 4.04 G/DL (ref 3.5–5.2)
ALBUMIN/GLOB SERPL: 1.5 G/DL
ALP SERPL-CCNC: 95 U/L (ref 39–117)
ALT SERPL W P-5'-P-CCNC: 12 U/L (ref 1–33)
ANION GAP SERPL CALCULATED.3IONS-SCNC: 13 MMOL/L (ref 5–15)
AST SERPL-CCNC: 14 U/L (ref 1–32)
BASOPHILS # BLD AUTO: 0.08 10*3/MM3 (ref 0–0.2)
BASOPHILS NFR BLD AUTO: 1.4 % (ref 0–1.5)
BILIRUB SERPL-MCNC: 0.4 MG/DL (ref 0–1.2)
BUN SERPL-MCNC: 18 MG/DL (ref 8–23)
BUN/CREAT SERPL: 24 (ref 7–25)
CALCIUM SPEC-SCNC: 9.7 MG/DL (ref 8.6–10.5)
CHLORIDE SERPL-SCNC: 95 MMOL/L (ref 98–107)
CO2 SERPL-SCNC: 23 MMOL/L (ref 22–29)
CREAT SERPL-MCNC: 0.75 MG/DL (ref 0.57–1)
DEPRECATED RDW RBC AUTO: 42.8 FL (ref 37–54)
EGFRCR SERPLBLD CKD-EPI 2021: 78.6 ML/MIN/1.73
EOSINOPHIL # BLD AUTO: 0.3 10*3/MM3 (ref 0–0.4)
EOSINOPHIL NFR BLD AUTO: 5.4 % (ref 0.3–6.2)
ERYTHROCYTE [DISTWIDTH] IN BLOOD BY AUTOMATED COUNT: 13.2 % (ref 12.3–15.4)
GLOBULIN UR ELPH-MCNC: 2.8 GM/DL
GLUCOSE SERPL-MCNC: 125 MG/DL (ref 65–99)
HCT VFR BLD AUTO: 37.8 % (ref 34–46.6)
HGB BLD-MCNC: 12.9 G/DL (ref 12–15.9)
IMM GRANULOCYTES # BLD AUTO: 0.02 10*3/MM3 (ref 0–0.05)
IMM GRANULOCYTES NFR BLD AUTO: 0.4 % (ref 0–0.5)
LYMPHOCYTES # BLD AUTO: 1.65 10*3/MM3 (ref 0.7–3.1)
LYMPHOCYTES NFR BLD AUTO: 29.8 % (ref 19.6–45.3)
MCH RBC QN AUTO: 30.4 PG (ref 26.6–33)
MCHC RBC AUTO-ENTMCNC: 34.1 G/DL (ref 31.5–35.7)
MCV RBC AUTO: 88.9 FL (ref 79–97)
MONOCYTES # BLD AUTO: 0.61 10*3/MM3 (ref 0.1–0.9)
MONOCYTES NFR BLD AUTO: 11 % (ref 5–12)
NEUTROPHILS NFR BLD AUTO: 2.87 10*3/MM3 (ref 1.7–7)
NEUTROPHILS NFR BLD AUTO: 52 % (ref 42.7–76)
NRBC BLD AUTO-RTO: 0 /100 WBC (ref 0–0.2)
PLATELET # BLD AUTO: 266 10*3/MM3 (ref 140–450)
PMV BLD AUTO: 9.1 FL (ref 6–12)
POTASSIUM SERPL-SCNC: 3.4 MMOL/L (ref 3.5–5.2)
PROT SERPL-MCNC: 6.8 G/DL (ref 6–8.5)
RBC # BLD AUTO: 4.25 10*6/MM3 (ref 3.77–5.28)
SODIUM SERPL-SCNC: 131 MMOL/L (ref 136–145)
WBC NRBC COR # BLD: 5.53 10*3/MM3 (ref 3.4–10.8)

## 2022-11-15 PROCEDURE — 77065 DX MAMMO INCL CAD UNI: CPT

## 2022-11-15 PROCEDURE — G0279 TOMOSYNTHESIS, MAMMO: HCPCS

## 2022-11-15 PROCEDURE — 77065 DX MAMMO INCL CAD UNI: CPT | Performed by: RADIOLOGY

## 2022-11-15 PROCEDURE — 85025 COMPLETE CBC W/AUTO DIFF WBC: CPT | Performed by: INTERNAL MEDICINE

## 2022-11-15 PROCEDURE — G0279 TOMOSYNTHESIS, MAMMO: HCPCS | Performed by: RADIOLOGY

## 2022-11-15 PROCEDURE — 80053 COMPREHEN METABOLIC PANEL: CPT | Performed by: INTERNAL MEDICINE

## 2022-12-08 NOTE — PROGRESS NOTES
NAME: Jackie Erwin    : 1940    DATE:  22    DIAGNOSIS:   Left Breast Cancers:    -  Stage IIA (pT1cN1(sn)MX) moderately differentiated Invasive Ductal Carcinoma.             Tumor 20 mm in maximal dimension.              1/4 SLN w/ 6 mm metastasis without extranodal extension.                5% 1+ ER+, WI-, HER-2/Milena -.    -  Stage IIA  (pT1cN1(sn)MX) moderately differentiated Invasive Lobular Carcinoma.               Tumor 18 mm in maximal dimension.             1/4 SLN w/ 6 mm metastasis without extranodal extension.               100% 3+ ER+, 80% 2-3+ WI+, HER-2/Milena 10% 2+ by IHC,            equivocal by FISH, ultimately judged to be negative.    CHIEF COMPLAINT:  Follow up of Breast Cancer     HISTORY OF PRESENT ILLNESS:   Jackie Erwin is a very pleasant 82 y.o. female who is being seen today at the request of No ref. provider found for evaluation and treatment of newly diagnosed breast cancer.  She says she noticed a lump in her L breast and so presented for further evaluation. She saw her PCP and was referred for imaging and biopsy and then saw Dr. Saunders.  She had biopsies of 2 distincet areas in the L breast.  The first showed IDC Grade 2 which was weakly ER+ and WI/HER-2 negative.  The other was an ILC which was strongly ER/WI + and HER2 equivocal by IHC/FISH but ultimately judged to be negative.   On 12/15/21, Dr. Saunders took her for L simple mastectomy.  Final pathology also showed two separate tumors as below.  1/4 SLN showed a 6 mm metastasis without extranodal extension.      Ms. Erwin is currently healing well and denies any specific complaints. She reports having a post op fever of 102.4 and was taken to [facility] ED for evaluation. She completed 2 doses of IV antibiotics and no identifiable cause was found and she has remained afebrile. She has completed a course of both oral Keflex and oral Bactrim.  She hasn't had further fevers.  She complains of fatigue which has  worsened since her surgery but has started walking again.  She lost about 5 lbs but weight has since been stable.  She has frequent chronic cough (since developing [place] in [place] in 1986) but this is no worse than her baseline..  She denies chest pain or shortness of breath.  She denies abdominal pain, nausea/vomiting/diarrha.  She has on/off constipation.  No rectal bleeding.  She is otherwise well and without complaint.    INTERVAL HISTORY:   Ms. Erwin is here today for follow up of breast cancer.  Since her last visit, she has overall been well.  She does have periodic discomfort across her chest wall and upper arm.  She continues to tolerate Arimidex well and continues to take Ca/Vit D.        PAST MEDICAL HISTORY:  Past Medical History:   Diagnosis Date   • Arthritis    • Arthritis    • Cancer (HCC)     LEFT BREAST   • Carotid artery disease (HCC)     Right sided, s/p Right carotid endarterectomy 2011   • Diabetes mellitus (HCC)     controlled with diet   • Diabetic neuropathy (HCC)    • Disease of thyroid gland     hypothyroidism   • Elevated cholesterol    • Hyperlipidemia    • Hypertension    • Neuropathy     LOWER LEGS AND FEET   • Peripheral neuropathy     r/t neuropathy   • Spinal headache    • Stroke (HCC)    • TIA (transient ischemic attack) 2011   • Unsteady gait when walking        PAST SURGICAL HISTORY:  Past Surgical History:   Procedure Laterality Date   • ABDOMINAL SURGERY     • APPENDECTOMY      6 yrs old   • CAROTID ENDARTERECTOMY     • CAROTID ENDARTERECTOMY Right 2011   • COLONOSCOPY     • HYSTERECTOMY  1969    age 29 done for dysmenorrhea   • INGUINAL HERNIA REPAIR  1970   • MASTECTOMY WITH SENTINEL NODE BIOPSY AND AXILLARY NODE DISSECTION Left 12/15/2021    Procedure: BREAST MASTECTOMY WITH SENTINEL NODE BIOPSY;  Surgeon: Kaylen Saunders MD;  Location: [facility];  Service: General;  Laterality: Left;       FAMILY HISTORY:  Family History   Problem Relation Age of Onset   •  Hypertension Mother    • Thrombosis Father    • Diabetes Maternal Grandmother    • Cancer Maternal Aunt         ?breast cancer, had mastectomy   • Cancer Maternal Cousin         colon   • Cancer Maternal Cousin         colon   • Cancer Maternal Cousin         colon   • Cancer Maternal Cousin         colon   • Breast cancer Neg Hx    2 siblings no cancer  4 other maternal aunts/uncles no cancer    SOCIAL HISTORY:  Social History     Socioeconomic History   • Marital status:    Tobacco Use   • Smoking status: Former     Packs/day: 0.50     Years: 20.00     Pack years: 10.00     Types: Cigarettes     Quit date:      Years since quittin.9   • Smokeless tobacco: Never   Vaping Use   • Vaping Use: Never used   Substance and Sexual Activity   • Alcohol use: No   • Drug use: No   • Sexual activity: Defer     Social History     Social History Narrative    lives in an assisted living facility [facility]    Worked in an office setting, 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  Significant findings as mentioned above.  All other systems reviewed and are negative.      MEDICATIONS:  The current medication list was reviewed in the EMR    Current Outpatient Medications:   •  anastrozole (ARIMIDEX) 1 MG tablet, Take 1 tablet by mouth Daily., Disp: 90 tablet, Rfl: 3  •  aspirin 81 MG EC tablet, Take 81 mg by mouth every night at bedtime., Disp: , Rfl:   •  Calcium Carbonate-Vitamin D3 (Calcium 600+D3) 600-400 MG-UNIT tablet, Take 1 tablet by mouth 2 (Two) Times a Day., Disp: 60 tablet, Rfl: 5  •  HYDROcodone-acetaminophen (Norco) 7.5-325 MG per tablet, Take 1 tablet by mouth 4 (Four) Times a Day As Needed for Moderate Pain ., Disp: 15 tablet, Rfl: 0  •  Ibuprofen-diphenhydrAMINE HCl (Advil PM) 200-25 MG capsule, Take 1 capsule by mouth Every Night., Disp: , Rfl:   •  levothyroxine (SYNTHROID,  LEVOTHROID) 150 MCG tablet, Take 1 tablet by mouth Daily., Disp: , Rfl:   •  multivitamin with minerals tablet tablet, Take 1 tablet by mouth Daily., Disp: , Rfl:   •  polyethyl glycol-propyl glycol (SYSTANE) 0.4-0.3 % solution ophthalmic solution (artificial tears), 1 drop Every 3 (Three) Hours As Needed., Disp: , Rfl:   •  potassium chloride (K-DUR,KLOR-CON) 10 MEQ CR tablet, Take 1 tablet by mouth Daily., Disp: 2 tablet, Rfl: 0  •  rosuvastatin (CRESTOR) 20 MG tablet, Take 20 mg by mouth Every Night., Disp: , Rfl:   •  sodium chloride 0.65 % nasal spray, 1 spray into the nostril(s) as directed by provider As Needed for Congestion., Disp: , Rfl:   •  valsartan (DIOVAN) 160 MG tablet, Take 160 mg by mouth Daily., Disp: , Rfl:   •  furosemide (LASIX) 20 MG tablet, Take 1 tablet by mouth Daily for 2 days., Disp: 2 tablet, Rfl: 0    ALLERGIES:    Allergies   Allergen Reactions   • Morphine Shortness Of Breath       PHYSICAL EXAM:  Vitals:    12/09/22 1117   BP: 151/76   Pulse: 71   Resp: 18   Temp: 97.3 °F (36.3 °C)   SpO2: 91%     Pain Score    12/09/22 1117   PainSc: 0-No pain     ECOG score: 0   General:  Awake, alert and oriented, in no distress  HEENT:  Pupils are equal, round and reactive to light and accommodation, Extra-ocular movements full, Oropharyx clear, mucous membranes moist  Neck:  No JVD, thyromegaly or lymphadenopathy  CV: regular rate and rhythm, no murmurs, rubs or gallops  Resp:  Lungs are clear to auscultation bilaterally, no crackles  Abd:  Soft, non-tender, non-distended, bowel sounds present, no organomegaly or masses  Breast:  S/p L mastectomy.  Surgical incisions smooth without nodularity.  R breast without palpable mass/lesions. No axillary adenopathy on either side.  Ext:  No clubbing, cyanosis, edema  Lymph:  No cervical, supraclavicular, axillary, adenopathy  Neuro:  MS as above,  grossly non-focal exam      PATHOLOGY:  11-04-21            Genetic testing  11-08-21              12-15-21            ENDOSCOPY:        IMAGING:  Mammo Diagnostic Digital Tomosynthesis Bilateral With CAD with US Breast Left Limited 11-04-21  FINDINGS:  There are scattered areas of fibroglandular density.     Right breast: The fibroglandular pattern is unremarkable in appearance.  There are scattered benign-appearing calcifications. No dominant mass,  suspicious calcifications, or areas of architectural distortion are  seen.     Left breast: Correlating with the area the patient is feeling in the  left 12:00 to 1:00 region is an approximately 2.1 cm irregular isodense  mass with associated architectural distortion and coarse heterogeneous  calcifications. In the posterior left 3:00 region there is a second  irregular isodense mass containing faint calcifications. This area is  less discrete mammographically. Other scattered calcifications in the  left breast are noted. The remaining fibroglandular pattern of the left  breast is unremarkable.     Left breast ultrasound: Focused sonographic evaluation of the left  breast demonstrates a 2.0 cm irregular hypoechoic mass located at 12:00,  6 cm from the nipple, as well as a 1.9 cm irregular hypoechoic mass  located at 3:00, 6 cm from the nipple. Both are suspicious for  malignancy. Ultrasound of the left axilla demonstrates no abnormal  appearing axillary lymph nodes.     IMPRESSION:  1. Benign right mammographic findings.     2. 2.0 cm mass in the left 12:00 region and 1.9 cm mass in the left 3:00  region which are both suspicious for malignancy. Recommend  ultrasound-guided core biopsy.        BI-RADS CATEGORY:  5, HIGHLY SUGGESTIVE OF MALIGNANCY        RECOMMENDATION:  Recommend ultrasound-guided core biopsy of masses in  the left 12:00 and left 3:00 regions.     CAD was utilized.        NM PET/CT Skull Base to Mid Thigh 11-19-21  PET/CT FINDINGS: The images obtained from the base of the skull to the  thoracic inlet showed no enlarged lymph  nodes or masses in the neck.  There were no areas of increased glucose uptake. At the thoracic inlet,  the thyroid gland is enlarged. There is increased uptake in both the  left and right lobes of the thyroid gland, which does extend slightly  into the substernal position. There is no supraclavicular  lymphadenopathy. No axillary lymphadenopathy was seen. There is a lesion  in the left breast that measures 15 mm in diameter. It does show some  increased glucose uptake with a SUV of 3.25. There is a hiatal hernia od  the stomach in the lower mediastinum. There are increased interstitial  markings noted throughout the lungs. The liver was normal in size and  shape. No focal lesions were seen in the liver. No adrenal lesions were  identified. The aorta shows atherosclerotic plaque but no aneurysmal  dilatation. There is no adenopathy in the retroperitoneum or mesentery.  No abnormal bone uptake was identified.     IMPRESSION:  The thyroid gland is enlarged, extends substernally and does  show some increased glucose uptake. There is a well circumscribed lesion  in the upper portion of the left breast. It measures 15 mm in diameter  and is hypermetabolic with an SUV value of 3.25. This would be  consistent with the patient's breast cancer. At this time, there are no  PET fusion CT findings of metastatic disease.      DEXA Bone Density Axial 02-11-22  FINDINGS:      CURRENT: The BMD measured at the Right Femur Neck is 0.706 gm/cm2 with a  T-score of -2.4.      PRIOR: The BMD measured at the Right Femur Total was 0.714 gm/cm2 with a  T-score of -2.3.         IMPRESSION:  The patient is considered to be osteopenic according to the World Health  Organization criteria. Fracture risk is moderate.  This represents  overall no significant change in bone mineral density.    US Venous Doppler Lower Extremity Left (duplex) (02/19/2022 11:07)  FINDINGS:  No evidence of a left lower extremity deep vein thrombosis. The common femoral  vein through the popliteal vein are widely patent. There is normal compressibility with spontaneous and phasic waveforms. No calf vein thrombus. Greater saphenous vein is  patent.     IMPRESSION:  No deep venous thrombus in the left lower extremity.     Mammo Diagnostic Digital Tomosynthesis Right With CAD (11/15/2022 10:20)  FINDINGS: There are scattered areas of fibroglandular density.     The fibronodular pattern of the right breast is stable in appearance. A  few scattered calcifications are noted. No dominant mass, suspicious  calcifications, or areas of architectural distortion are seen.     IMPRESSION:  Benign right mammographic findings.        BI-RADS CATEGORY:  2, BENIGN        RECOMMENDATION:  Recommend the patient continue annual mammographic  evaluation of the right breast.     CAD was utilized.     The standard false-negative rate of mammography is between 10% and 25%.   Complex patterns or increased breast density will markedly elevate the  false-negative rate of mammography.        The patient was notified of the results and recommendations at the time  of her visit.  Additionally, a letter, in lay terminology, with the  results of this exam will be mailed to the patient.    RECENT LABS:  Lab Results   Component Value Date    WBC 6.24 12/09/2022    HGB 12.2 12/09/2022    HCT 35.9 12/09/2022    MCV 88.6 12/09/2022    RDW 13.2 12/09/2022     12/09/2022    NEUTRORELPCT 52.8 12/09/2022    LYMPHORELPCT 28.2 12/09/2022    MONORELPCT 13.1 (H) 12/09/2022    EOSRELPCT 4.3 12/09/2022    BASORELPCT 1.3 12/09/2022    NEUTROABS 3.29 12/09/2022    LYMPHSABS 1.76 12/09/2022       Lab Results   Component Value Date     (L) 12/09/2022    K 3.6 12/09/2022    CO2 26.1 12/09/2022    CL 97 (L) 12/09/2022    BUN 20 12/09/2022    CREATININE 0.69 12/09/2022    EGFRIFNONA 82 02/19/2022    GLUCOSE 106 (H) 12/09/2022    CALCIUM 9.5 12/09/2022    ALKPHOS 91 12/09/2022    AST 15 12/09/2022    ALT 9 12/09/2022     BILITOT 0.4 12/09/2022    ALBUMIN 3.97 12/09/2022    PROTEINTOT 6.8 12/09/2022    MG 2.1 02/19/2022    PHOS 3.5 08/19/2018     Lab Results   Component Value Date    FFUEVBJR11 386 01/12/2022    FOLATE 18.70 01/12/2022     Lab Results   Component Value Date    TSH 0.230 (L) 01/12/2022     Lab Results   Component Value Date     18.1 12/13/2021     Lab Results   Component Value Date    LABCA2 23.0 12/13/2021       ASSESSMENT & PLAN:  Jackie Erwin is a very pleasant 82 y.o. 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    1.  Left Breast Cancers:       -  Stage IIA (pT1cN1(sn)MX) moderately differentiated Invasive Ductal Carcinoma. Tumor 20 mm in maximal dimension.            1/4 SLN w/ 6 mm metastasis without extranodal extension.   5% 1+ ER+, SD-, HER-2/Milena -.       -  Stage IIA  (pT1cN1(sn)MX) moderately differentiated Invasive Lobular Carcinoma.  Tumor 18 mm in maximal dimension.          1/4 SLN w/ 6 mm metastasis without extranodal extension.  100% 3+ ER+, 80% 2-3+ SD+, HER-2/Milena 10% 2+ by IHC,            equivocal by FISH, ultimately judged to be negative.    -  Ms. Erwin is s/p successful resection via simple L mastectomy / SLNBx. We previously discussed that we would often discuss consideration of Oncotype/Mammaprint, but given her age and comorbidities, concerned that she would not tolerate chemotherapy well. She and her daughter were in agreement.   -  Given this,  recommended adjuvant hormonal therapy.  She started Arimidex and is tolerating it well.  -  Baseline DEXA scan (2-11-22) showed osteopenia with T score -2.3.  We discussed this at length.  Could  Switch Arimidex to Tamoxifen or continue Arimidex and add Ca/Vit D with or without Prolia.  After a lengthy discussion about the pros and cons to different alternatives, she decided she would like to continue Arimidex and will start Ca/Vit D. She declined Prolia therapy. Therefore, will plan to repeat DEXA  after a 2 year interval.    -At present, clinically she continues to do well.  She has occasional twinges of discomfort along the L chest wall and arm, likely related to treatment-related change. No concerning exam findings.   Exam/labs without cause for concern. Will continue with Arimidex and Ca/Vit D.  -  R mammogram 11/15/23 without cause for concern.  -She will need repeat right mammogram p 11/15/23..  -She will return to see me in 3 months with CBCD, CMP, TSH, Vit D.    2. CHEK-2 mutation:  -  Offered referral for genetic counseling, but she didn't want to do this.  Her family members are aware and are being tested appropriately.    3. Prophylaxis:  -  Had COVID vaccination + booster  -  Had 2022 influenza vaccination    6.  F/u:  -  Continue Arimidex and Ca/Vit D. (Declined Prolia)   - Return to see me in 3 months with CBCD, CMP, TSH, Vit D.  -  R mammogram due p 11/15/23  -  DEXA due p 2-11-24    ACO / NICHOLE/Other  Quality measures  -  Jackiesierra Erwin received 2022 flu vaccine.  -  Jackie Erwin reports a pain score of 0.    -  Current outpatient and discharge medications have been reconciled for the patient.  Reviewed by: Radha Mills MD      I spent 20 minutes with Jackie Erwin today.  In the office today, more than 50% of this time was spent face-to-face with her  in counseling / coordination of care, reviewing her medical history and counseling on the current treatment plan.  All questions were answered to her satisfaction      Electronically Signed by: Radha Mills MD

## 2022-12-09 ENCOUNTER — LAB (OUTPATIENT)
Dept: ONCOLOGY | Facility: CLINIC | Age: 82
End: 2022-12-09

## 2022-12-09 ENCOUNTER — OFFICE VISIT (OUTPATIENT)
Dept: ONCOLOGY | Facility: CLINIC | Age: 82
End: 2022-12-09

## 2022-12-09 VITALS
SYSTOLIC BLOOD PRESSURE: 151 MMHG | TEMPERATURE: 97.3 F | OXYGEN SATURATION: 91 % | RESPIRATION RATE: 18 BRPM | WEIGHT: 204 LBS | BODY MASS INDEX: 35.02 KG/M2 | DIASTOLIC BLOOD PRESSURE: 76 MMHG | HEART RATE: 71 BPM

## 2022-12-09 DIAGNOSIS — Z17.0 MALIGNANT NEOPLASM OF OVERLAPPING SITES OF LEFT BREAST IN FEMALE, ESTROGEN RECEPTOR POSITIVE: Primary | ICD-10-CM

## 2022-12-09 DIAGNOSIS — C50.919 CHEK2-RELATED BREAST CANCER: ICD-10-CM

## 2022-12-09 DIAGNOSIS — Z15.09 CHEK2-RELATED BREAST CANCER: ICD-10-CM

## 2022-12-09 DIAGNOSIS — M85.89 OSTEOPENIA OF MULTIPLE SITES: ICD-10-CM

## 2022-12-09 DIAGNOSIS — R53.83 FATIGUE, UNSPECIFIED TYPE: ICD-10-CM

## 2022-12-09 DIAGNOSIS — Z17.0 MALIGNANT NEOPLASM OF OVERLAPPING SITES OF LEFT BREAST IN FEMALE, ESTROGEN RECEPTOR POSITIVE: ICD-10-CM

## 2022-12-09 DIAGNOSIS — C50.812 MALIGNANT NEOPLASM OF OVERLAPPING SITES OF LEFT BREAST IN FEMALE, ESTROGEN RECEPTOR POSITIVE: Primary | ICD-10-CM

## 2022-12-09 DIAGNOSIS — Z15.89 CHEK2-RELATED BREAST CANCER: ICD-10-CM

## 2022-12-09 DIAGNOSIS — C50.812 MALIGNANT NEOPLASM OF OVERLAPPING SITES OF LEFT BREAST IN FEMALE, ESTROGEN RECEPTOR POSITIVE: ICD-10-CM

## 2022-12-09 DIAGNOSIS — M85.89 OSTEOPENIA OF MULTIPLE SITES: Primary | ICD-10-CM

## 2022-12-09 DIAGNOSIS — Z15.02 CHEK2-RELATED BREAST CANCER: ICD-10-CM

## 2022-12-09 DIAGNOSIS — E55.9 VITAMIN D DEFICIENCY, UNSPECIFIED: ICD-10-CM

## 2022-12-09 LAB
25(OH)D3 SERPL-MCNC: 35.4 NG/ML (ref 30–100)
ALBUMIN SERPL-MCNC: 3.97 G/DL (ref 3.5–5.2)
ALBUMIN/GLOB SERPL: 1.4 G/DL
ALP SERPL-CCNC: 91 U/L (ref 39–117)
ALT SERPL W P-5'-P-CCNC: 9 U/L (ref 1–33)
ANION GAP SERPL CALCULATED.3IONS-SCNC: 9.9 MMOL/L (ref 5–15)
AST SERPL-CCNC: 15 U/L (ref 1–32)
BASOPHILS # BLD AUTO: 0.08 10*3/MM3 (ref 0–0.2)
BASOPHILS NFR BLD AUTO: 1.3 % (ref 0–1.5)
BILIRUB SERPL-MCNC: 0.4 MG/DL (ref 0–1.2)
BUN SERPL-MCNC: 20 MG/DL (ref 8–23)
BUN/CREAT SERPL: 29 (ref 7–25)
CALCIUM SPEC-SCNC: 9.5 MG/DL (ref 8.6–10.5)
CHLORIDE SERPL-SCNC: 97 MMOL/L (ref 98–107)
CO2 SERPL-SCNC: 26.1 MMOL/L (ref 22–29)
CREAT SERPL-MCNC: 0.69 MG/DL (ref 0.57–1)
DEPRECATED RDW RBC AUTO: 43.4 FL (ref 37–54)
EGFRCR SERPLBLD CKD-EPI 2021: 85.7 ML/MIN/1.73
EOSINOPHIL # BLD AUTO: 0.27 10*3/MM3 (ref 0–0.4)
EOSINOPHIL NFR BLD AUTO: 4.3 % (ref 0.3–6.2)
ERYTHROCYTE [DISTWIDTH] IN BLOOD BY AUTOMATED COUNT: 13.2 % (ref 12.3–15.4)
GLOBULIN UR ELPH-MCNC: 2.8 GM/DL
GLUCOSE SERPL-MCNC: 106 MG/DL (ref 65–99)
HCT VFR BLD AUTO: 35.9 % (ref 34–46.6)
HGB BLD-MCNC: 12.2 G/DL (ref 12–15.9)
IMM GRANULOCYTES # BLD AUTO: 0.02 10*3/MM3 (ref 0–0.05)
IMM GRANULOCYTES NFR BLD AUTO: 0.3 % (ref 0–0.5)
LYMPHOCYTES # BLD AUTO: 1.76 10*3/MM3 (ref 0.7–3.1)
LYMPHOCYTES NFR BLD AUTO: 28.2 % (ref 19.6–45.3)
MCH RBC QN AUTO: 30.1 PG (ref 26.6–33)
MCHC RBC AUTO-ENTMCNC: 34 G/DL (ref 31.5–35.7)
MCV RBC AUTO: 88.6 FL (ref 79–97)
MONOCYTES # BLD AUTO: 0.82 10*3/MM3 (ref 0.1–0.9)
MONOCYTES NFR BLD AUTO: 13.1 % (ref 5–12)
NEUTROPHILS NFR BLD AUTO: 3.29 10*3/MM3 (ref 1.7–7)
NEUTROPHILS NFR BLD AUTO: 52.8 % (ref 42.7–76)
NRBC BLD AUTO-RTO: 0 /100 WBC (ref 0–0.2)
PLATELET # BLD AUTO: 269 10*3/MM3 (ref 140–450)
PMV BLD AUTO: 9.1 FL (ref 6–12)
POTASSIUM SERPL-SCNC: 3.6 MMOL/L (ref 3.5–5.2)
PROT SERPL-MCNC: 6.8 G/DL (ref 6–8.5)
RBC # BLD AUTO: 4.05 10*6/MM3 (ref 3.77–5.28)
SODIUM SERPL-SCNC: 133 MMOL/L (ref 136–145)
WBC NRBC COR # BLD: 6.24 10*3/MM3 (ref 3.4–10.8)

## 2022-12-09 PROCEDURE — 99213 OFFICE O/P EST LOW 20 MIN: CPT | Performed by: INTERNAL MEDICINE

## 2022-12-09 PROCEDURE — 85025 COMPLETE CBC W/AUTO DIFF WBC: CPT | Performed by: INTERNAL MEDICINE

## 2022-12-09 PROCEDURE — 82306 VITAMIN D 25 HYDROXY: CPT | Performed by: INTERNAL MEDICINE

## 2022-12-09 PROCEDURE — 36415 COLL VENOUS BLD VENIPUNCTURE: CPT

## 2022-12-09 PROCEDURE — 80053 COMPREHEN METABOLIC PANEL: CPT | Performed by: INTERNAL MEDICINE

## 2023-01-06 NOTE — PROGRESS NOTES
NAME: Jackie Erwin    : 1940    DATE:  22    DIAGNOSIS: Breast Cancer    CHIEF COMPLAINT:  Follow up of Breast Cancer       HISTORY OF PRESENT ILLNESS:   Jackie Erwin is a very pleasant 81 y.o. female who is being seen today at the request of No ref. provider found for evaluation and treatment of newly diagnosed breast cancer.  She says she noticed a lump in her L breast and so presented for further evaluation. She saw her PCP and was referred for imaging and biopsy and then saw Dr. Saunders.  She had biopsies of 2 distincet areas in the L breast.  The first showed IDC Grade 2 which was weakly ER+ and DC/HER-2 negative.  The other was an ILC which was strongly ER/DC + and HER2 equivocal by IHC/FISH but ultimately judged to be negative.   On 12/15/21, Dr. Saunders took her for L simple mastectomy.  Final pathology also showed two separate tumors as below.   SLN showed a 6 mm metastasis without extranodal extension.      Ms. Erwin is currently healing well and denies any specific complaints. She reports having a post op fever of 102.4 and was taken to [facility] ED for evaluation. She completed 2 doses of IV antibiotics and no identifiable cause was found and she has remained afebrile. She has completed a course of both oral Keflex and oral Bactrim.  She hasn't had further fevers.  She complains of fatigue which has worsened since her surgery but has started walking again.  She lost about 5 lbs but weight has since been stable.  She has frequent chronic cough (since developing [place] in [place] in ) but this is no worse than her baseline..  She denies chest pain or shortness of breath.  She denies abdominal pain, nausea/vomiting/diarrha.  She has on/off constipation.  No rectal bleeding.  She is otherwise well and without complaint.      INTERVAL HISTORY:   Ms. Erwin is here today for follow up of breast cancer. She reports she is feeling well and denies any specific complaints.  She  Writer placed call to pharmacy to make sure previous MS Contin dose is cancelled. Pharmacy confirmed.    does continue to struggle a little with fatigue but has been active, walking etc.  She has a small area of tenderness laterally in the L axillary area but this isn't bad and she denies pain or tenderness anywhere else..         PAST MEDICAL HISTORY:  Past Medical History:   Diagnosis Date   • Arthritis    • Arthritis    • Cancer (HCC)     LEFT BREAST   • Carotid artery disease (HCC)     Right sided, s/p Right carotid endarterectomy 2011   • Diabetes mellitus (HCC)     controlled with diet   • Diabetic neuropathy (HCC)    • Disease of thyroid gland     hypothyroidism   • Elevated cholesterol    • Hyperlipidemia    • Hypertension    • Neuropathy     LOWER LEGS AND FEET   • Peripheral neuropathy     r/t neuropathy   • Spinal headache    • Stroke (HCC)    • TIA (transient ischemic attack) 2011   • Unsteady gait when walking        PAST SURGICAL HISTORY:  Past Surgical History:   Procedure Laterality Date   • ABDOMINAL SURGERY     • APPENDECTOMY      6 yrs old   • CAROTID ENDARTERECTOMY     • CAROTID ENDARTERECTOMY Right 2011   • COLONOSCOPY     • HYSTERECTOMY      age 29 done for dysmenorrhea   • INGUINAL HERNIA REPAIR  1970   • MASTECTOMY WITH SENTINEL NODE BIOPSY AND AXILLARY NODE DISSECTION Left 12/15/2021    Procedure: BREAST MASTECTOMY WITH SENTINEL NODE BIOPSY;  Surgeon: Kaylen Saunders MD;  Location: [facility];  Service: General;  Laterality: Left;       FAMILY HISTORY:  Family History   Problem Relation Age of Onset   • Hypertension Mother    • Thrombosis Father    • Diabetes Maternal Grandmother    • Cancer Maternal Aunt         ?breast cancer, had mastectomy   • Cancer Maternal Cousin         colon   • Cancer Maternal Cousin         colon   • Cancer Maternal Cousin         colon   • Cancer Maternal Cousin         colon   • Breast cancer Neg Hx    2 siblings no cancer  4 other maternal aunts/uncles no cancer    SOCIAL HISTORY:  Social History     Socioeconomic History   • Marital status:    Tobacco  Use   • Smoking status: Former Smoker     Packs/day: 0.50     Years: 20.00     Pack years: 10.00     Types: Cigarettes     Quit date:      Years since quittin.1   • Smokeless tobacco: Never Used   Vaping Use   • Vaping Use: Never used   Substance and Sexual Activity   • Alcohol use: No   • Drug use: No   • Sexual activity: Defer     Social History     Social History Narrative    lives in an assisted living facility [facility]    Worked in an office setting, 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  Significant findings as mentioned above.  All other systems reviewed and are negative.      MEDICATIONS:  The current medication list was reviewed in the EMR    Current Outpatient Medications:   •  aspirin 81 MG EC tablet, Take 81 mg by mouth every night at bedtime., Disp: , Rfl:   •  HYDROcodone-acetaminophen (Norco) 7.5-325 MG per tablet, Take 1 tablet by mouth 4 (Four) Times a Day As Needed for Moderate Pain ., Disp: 15 tablet, Rfl: 0  •  Ibuprofen-diphenhydrAMINE HCl (Advil PM) 200-25 MG capsule, Take 1 capsule by mouth Every Night., Disp: , Rfl:   •  levothyroxine (SYNTHROID, LEVOTHROID) 150 MCG tablet, Take 1 tablet by mouth Daily., Disp: , Rfl:   •  multivitamin with minerals (OCUVITE EYE + MULTI PO), Take 1 tablet by mouth Daily., Disp: , Rfl:   •  polyethyl glycol-propyl glycol (SYSTANE) 0.4-0.3 % solution ophthalmic solution (artificial tears), 1 drop Every 3 (Three) Hours As Needed., Disp: , Rfl:   •  rosuvastatin (CRESTOR) 20 MG tablet, Take 20 mg by mouth Every Night., Disp: , Rfl:   •  sodium chloride 0.65 % nasal spray, 1 spray into the nostril(s) as directed by provider As Needed for Congestion., Disp: , Rfl:   •  valsartan (DIOVAN) 160 MG tablet, Take 160 mg by mouth Daily., Disp: , Rfl:     ALLERGIES:    Allergies   Allergen Reactions   • Morphine Shortness Of Breath        PHYSICAL EXAM:  Vitals:    01/31/22 1004   BP: 156/74   Pulse: 80   Resp: 18   Temp: 97.1 °F (36.2 °C)   SpO2: 94%     Pain Score    01/31/22 1004   PainSc: 0-No pain     ECOG score: 0       General:  Awake, alert and oriented, in no distress  HEENT:  Pupils are equal, round and reactive to light and accommodation, Extra-ocular movements full, Oropharyx clear, mucous membranes moist  Neck:  No JVD, thyromegaly or lymphadenopathy  CV:  Regular rate and rhythm, no murmurs, rubs or gallops  Resp:  Lungs are clear to auscultation bilaterally, she had a few crackles of the BLL which clear with cough  Abd:  Soft, non-tender, non-distended, bowel sounds present, no organomegaly or masses  Breast:  S/p L mastectomy.  Surgical incisions smooth and intact.  R breast without mass lesions. No axillary adenopathy on either side.  Ext:  No clubbing, cyanosis or edema  Lymph:  No cervical, supraclavicular, axillary, inguinal or femoral adenopathy  Neuro:  MS as above, CN II-XII intact, grossly non-focal exam      PATHOLOGY:  11-04-21            Genetic testing 11-08-21              12-15-21            ENDOSCOPY:        IMAGING:  Mammo Diagnostic Digital Tomosynthesis Bilateral With CAD with US Breast Left Limited 11-04-21  FINDINGS:  There are scattered areas of fibroglandular density.     Right breast: The fibroglandular pattern is unremarkable in appearance.  There are scattered benign-appearing calcifications. No dominant mass,  suspicious calcifications, or areas of architectural distortion are  seen.     Left breast: Correlating with the area the patient is feeling in the  left 12:00 to 1:00 region is an approximately 2.1 cm irregular isodense  mass with associated architectural distortion and coarse heterogeneous  calcifications. In the posterior left 3:00 region there is a second  irregular isodense mass containing faint calcifications. This area is  less discrete mammographically. Other scattered calcifications in  the  left breast are noted. The remaining fibroglandular pattern of the left  breast is unremarkable.     Left breast ultrasound: Focused sonographic evaluation of the left  breast demonstrates a 2.0 cm irregular hypoechoic mass located at 12:00,  6 cm from the nipple, as well as a 1.9 cm irregular hypoechoic mass  located at 3:00, 6 cm from the nipple. Both are suspicious for  malignancy. Ultrasound of the left axilla demonstrates no abnormal  appearing axillary lymph nodes.     IMPRESSION:  1. Benign right mammographic findings.     2. 2.0 cm mass in the left 12:00 region and 1.9 cm mass in the left 3:00  region which are both suspicious for malignancy. Recommend  ultrasound-guided core biopsy.        BI-RADS CATEGORY:  5, HIGHLY SUGGESTIVE OF MALIGNANCY        RECOMMENDATION:  Recommend ultrasound-guided core biopsy of masses in  the left 12:00 and left 3:00 regions.     CAD was utilized.        NM PET/CT Skull Base to Mid Thigh 11-19-21  PET/CT FINDINGS: The images obtained from the base of the skull to the  thoracic inlet showed no enlarged lymph nodes or masses in the neck.  There were no areas of increased glucose uptake. At the thoracic inlet,  the thyroid gland is enlarged. There is increased uptake in both the  left and right lobes of the thyroid gland, which does extend slightly  into the substernal position. There is no supraclavicular  lymphadenopathy. No axillary lymphadenopathy was seen. There is a lesion  in the left breast that measures 15 mm in diameter. It does show some  increased glucose uptake with a SUV of 3.25. There is a hiatal hernia od  the stomach in the lower mediastinum. There are increased interstitial  markings noted throughout the lungs. The liver was normal in size and  shape. No focal lesions were seen in the liver. No adrenal lesions were  identified. The aorta shows atherosclerotic plaque but no aneurysmal  dilatation. There is no adenopathy in the retroperitoneum or  mesentery.  No abnormal bone uptake was identified.     IMPRESSION:  The thyroid gland is enlarged, extends substernally and does  show some increased glucose uptake. There is a well circumscribed lesion  in the upper portion of the left breast. It measures 15 mm in diameter  and is hypermetabolic with an SUV value of 3.25. This would be  consistent with the patient's breast cancer. At this time, there are no  PET fusion CT findings of metastatic disease.        RECENT LABS:  Lab Results   Component Value Date    WBC 8.24 01/12/2022    HGB 12.5 01/12/2022    HCT 38.5 01/12/2022    MCV 88.1 01/12/2022    RDW 13.5 01/12/2022     01/12/2022    NEUTRORELPCT 58.0 01/12/2022    LYMPHORELPCT 28.4 01/12/2022    MONORELPCT 8.1 01/12/2022    EOSRELPCT 3.8 01/12/2022    BASORELPCT 1.1 01/12/2022    NEUTROABS 4.78 01/12/2022    LYMPHSABS 2.34 01/12/2022       Lab Results   Component Value Date     (L) 01/12/2022    K 4.6 01/12/2022    CO2 22.9 01/12/2022    CL 98 01/12/2022    BUN 18 01/12/2022    CREATININE 0.68 01/12/2022    EGFRIFNONA 83 01/12/2022    GLUCOSE 96 01/12/2022    CALCIUM 9.7 01/12/2022    ALKPHOS 124 (H) 01/12/2022    AST 19 01/12/2022    ALT 18 01/12/2022    BILITOT 0.3 01/12/2022    ALBUMIN 4.32 01/12/2022    PROTEINTOT 7.2 01/12/2022    MG 2.1 08/19/2018    PHOS 3.5 08/19/2018     Lab Results   Component Value Date    ZSZSSSFL27 386 01/12/2022    FOLATE 18.70 01/12/2022     Lab Results   Component Value Date    TSH 0.230 (L) 01/12/2022     Lab Results   Component Value Date     18.1 12/13/2021     Lab Results   Component Value Date    LABCA2 23.0 12/13/2021           ASSESSMENT & PLAN:  Jackie Erwin is a very pleasant 81 y.o. 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    1.  Left Breast Cancers:       -  Stage IIA (pT1cN1(sn)MX) moderately differentiated Invasive Ductal Carcinoma. Tumor 20 mm in maximal dimension.            1/4 SLN w/ 6 mm  metastasis without extranodal extension.   5% 1+ ER+, NV-, HER-2/Milena -.       -  Stage IIA  (pT1cN1(sn)MX) moderately differentiated Invasive Lobular Carcinoma.  Tumor 18 mm in maximal dimension.          1/4 SLN w/ 6 mm metastasis without extranodal extension.  100% 3+ ER+, 80% 2-3+ NV+, HER-2/Milena 10% 2+ by IHC,            equivocal by FISH, ultimately judged to be negative.    -  Ms. Erwin is s/p successful resection via simple L mastectomy / SLNBx.  She has recovered well from her surgery.  We previously discussed that we would often discuss consideration of Oncotype/Mammaprint, but given her age and comorbidities, I was concerned that she would not tolerate chemotherapy well. She and her daughter were in agreement.   -  Given this, I have recommended adjuvant hormonal therapy.    -  Will check DEXA scan to evaluate BMD.  She is at risk for OP given early surgical menopause.    2. CHEK-2 mutation:  -  Offered referral for genetic counseling, but she doesn't want to do this.  Her family members are aware and are being tested appropriately.    3. Fatigue:  -  CBCD, CMP, B12, Folate unremarkable.  -  TSH low.  Let Ms. Erwin know and sent lab to Dr. Bennett for review.  She says her synthroid was recently increased to 150 mcg.      4.  Prophylaxis:  -  Had COVID vaccination + booster  -  Had 2021 influenza vaccination    5.  F/u:  -  Check DEXA scan  -  Start Arimidex  -  Send TSH to Dr. Bennett in case he wants to adjust Synthroid  -  Return after the above evaluation    This note was scribed for Radha Mills MD by Deepthi Mccarty RN.    I, Radha Mills MD, personally performed the services described in this documentation as scribed by the above named individual in my presence, and it is both accurate and complete.  01/31/2022       I spent 20 minutes with Jackie Erwin today.  In the office today, more than 50% of this time was spent face-to-face with her  in counseling / coordination of care, reviewing  her medical history and counseling on the current treatment plan.  All questions were answered to her satisfaction      Electronically Signed by: Radha Mills MD      CC:     Samuel Duane Kreis, MD Barbara A Michna, MD

## 2023-02-06 ENCOUNTER — OFFICE VISIT (OUTPATIENT)
Dept: SURGERY | Facility: CLINIC | Age: 83
End: 2023-02-06
Payer: MEDICARE

## 2023-02-06 VITALS
DIASTOLIC BLOOD PRESSURE: 70 MMHG | HEIGHT: 64 IN | HEART RATE: 84 BPM | WEIGHT: 204.6 LBS | BODY MASS INDEX: 34.93 KG/M2 | SYSTOLIC BLOOD PRESSURE: 138 MMHG

## 2023-02-06 DIAGNOSIS — Z15.09 MALIGNANT NEOPLASM OF BREAST ASSOCIATED WITH MUTATION IN CHEK2 GENE: ICD-10-CM

## 2023-02-06 DIAGNOSIS — C50.812 MALIGNANT NEOPLASM OF OVERLAPPING SITES OF LEFT BREAST IN FEMALE, ESTROGEN RECEPTOR POSITIVE: Primary | ICD-10-CM

## 2023-02-06 DIAGNOSIS — C50.919 MALIGNANT NEOPLASM OF BREAST ASSOCIATED WITH MUTATION IN CHEK2 GENE: ICD-10-CM

## 2023-02-06 DIAGNOSIS — Z15.02 MALIGNANT NEOPLASM OF BREAST ASSOCIATED WITH MUTATION IN CHEK2 GENE: ICD-10-CM

## 2023-02-06 DIAGNOSIS — M85.851 OSTEOPENIA OF NECK OF RIGHT FEMUR: ICD-10-CM

## 2023-02-06 DIAGNOSIS — Z15.89 MALIGNANT NEOPLASM OF BREAST ASSOCIATED WITH MUTATION IN CHEK2 GENE: ICD-10-CM

## 2023-02-06 DIAGNOSIS — Z17.0 MALIGNANT NEOPLASM OF OVERLAPPING SITES OF LEFT BREAST IN FEMALE, ESTROGEN RECEPTOR POSITIVE: Primary | ICD-10-CM

## 2023-02-06 PROCEDURE — 93702 BIS XTRACELL FLUID ANALYSIS: CPT | Performed by: SURGERY

## 2023-02-06 PROCEDURE — 99213 OFFICE O/P EST LOW 20 MIN: CPT | Performed by: SURGERY

## 2023-02-06 NOTE — PROGRESS NOTES
Subjective   Jackie Erwin is a 82 y.o. female here today for mammogram follow up.    History of Present Illness  Ms. Erwin was seen in the office today for breast cancer follow-up.  She is status post a left mastectomy with sentinel node biopsy on 12/15/2021 with 2 separate primary cancers.  Patient was also found to have a Chek 2 mutation.  She was evaluated by medical oncology and because of significant comorbidities she was placed on anastrozole which she is tolerating well although does report leg pain.   She denies any new palpable abnormalities in the breast.  She denies arm edema.    Ms. Erwin had a right mammogram on 11/15/2022 which demonstrated no suspicious findings.  Most recent bone density was 2/11/2022 which demonstrated a T score of -2.4  Allergies   Allergen Reactions   • Morphine Shortness Of Breath       Current Outpatient Medications   Medication Sig Dispense Refill   • anastrozole (ARIMIDEX) 1 MG tablet Take 1 tablet by mouth Daily. 90 tablet 3   • aspirin 81 MG EC tablet Take 81 mg by mouth every night at bedtime.     • Calcium Carbonate-Vitamin D3 (Calcium 600+D3) 600-400 MG-UNIT tablet Take 1 tablet by mouth 2 (Two) Times a Day. 60 tablet 5   • HYDROcodone-acetaminophen (Norco) 7.5-325 MG per tablet Take 1 tablet by mouth 4 (Four) Times a Day As Needed for Moderate Pain . 15 tablet 0   • Ibuprofen-diphenhydrAMINE HCl (Advil PM) 200-25 MG capsule Take 1 capsule by mouth Every Night.     • levothyroxine (SYNTHROID, LEVOTHROID) 150 MCG tablet Take 1 tablet by mouth Daily.     • multivitamin with minerals tablet tablet Take 1 tablet by mouth Daily.     • polyethyl glycol-propyl glycol (SYSTANE) 0.4-0.3 % solution ophthalmic solution (artificial tears) 1 drop Every 3 (Three) Hours As Needed.     • potassium chloride (K-DUR,KLOR-CON) 10 MEQ CR tablet Take 1 tablet by mouth Daily. 2 tablet 0   • rosuvastatin (CRESTOR) 20 MG tablet Take 20 mg by mouth Every Night.     • sodium chloride 0.65 %  "nasal spray 1 spray into the nostril(s) as directed by provider As Needed for Congestion.     • valsartan (DIOVAN) 160 MG tablet Take 160 mg by mouth Daily.     • furosemide (LASIX) 20 MG tablet Take 1 tablet by mouth Daily for 2 days. 2 tablet 0     No current facility-administered medications for this visit.     Past Medical History:   Diagnosis Date   • Arthritis    • Arthritis    • Cancer (HCC)     LEFT BREAST   • Carotid artery disease (HCC)     Right sided, s/p Right carotid endarterectomy 2011   • Diabetes mellitus (HCC)     controlled with diet   • Diabetic neuropathy (HCC)    • Disease of thyroid gland     hypothyroidism   • Elevated cholesterol    • Hyperlipidemia    • Hypertension    • Neuropathy     LOWER LEGS AND FEET   • Peripheral neuropathy     r/t neuropathy   • Spinal headache    • Stroke (HCC)    • TIA (transient ischemic attack) 2011   • Unsteady gait when walking      Past Surgical History:   Procedure Laterality Date   • ABDOMINAL SURGERY     • APPENDECTOMY      6 yrs old   • CAROTID ENDARTERECTOMY     • CAROTID ENDARTERECTOMY Right 2011   • COLONOSCOPY     • HYSTERECTOMY  1969    age 29 done for dysmenorrhea   • INGUINAL HERNIA REPAIR  1970   • MASTECTOMY WITH SENTINEL NODE BIOPSY AND AXILLARY NODE DISSECTION Left 12/15/2021    Procedure: BREAST MASTECTOMY WITH SENTINEL NODE BIOPSY;  Surgeon: Kaylen Saunders MD;  [facility];  Service: General;  Laterality: Left;       Pertinent Review of Systems  Sternal pain  Uses walker    Objective   Ht 162.6 cm (64\")   Wt 92.8 kg (204 lb 9.6 oz)   LMP  (LMP Unknown)   BMI 35.12 kg/m²    Physical Exam  Well-developed well-nourished female no acute distress  Neck:  No supraclavicular adenopathy  Lungs:  Respiratory effort normal. Auscultation: Clear, without wheezes, rhonchi, rales  Heart:  Regular rate and rhythm, without murmur, gallop, rub.  No pedal edema  Breasts: On visual inspection the left breast is surgically absent.  Examination of " the right breast demonstrates no discrete mass, skin change, or axillary adenopathy.  Examination of the left chest wall demonstrates some tenderness over the mastectomy site.    The previous fullness in the left axilla is not present on today's examination and the tenderness has significantly improved   upper extremity: Without edema    Procedures   L dex was performed and was -2.0, with the prior on 8/15/2022 being 1.1  Results/Data:  Imaging: Mammogram reports and images from 11/15/2022 were reviewed.  I agree with the assessment  Notes: Records from medical oncology from 12/90/22 were reviewed.  Lab:   Other:     Assessment & Plan   Invasive ductal carcinoma, 12:00, ER low positive, AR negative, HER-2 negative.  20 mm greatest dimension  Invasive lobular carcinoma, 3:00, ER positive, AR positive, HER-2 negative.  T2N1  Positive Chek mutation -patient did not wish contralateral mastectomy  Osteopenia    Plan: Continue anastrozole  As the patient has a Chek 2 mutation and is at increased risk for contralateral breast cancer we will schedule her for a right mammogram and May 2023 with return to the office following       Discussion/Summary:    Time spent:     Class 2 Severe Obesity (BMI >=35 and <=39.9). Obesity-related health conditions include the following: hypertension. Obesity is improving with lifestyle modifications. BMI is is above average; BMI management plan is completed. We discussed portion control and increasing exercise.         Future Appointments   Date Time Provider Department Center   3/15/2023  9:00 AM [facility]   3/15/2023  9:30 AM Radha Mills MD [facility]         Please note that portions of this note were completed with a voice recognition program.

## 2023-02-07 DIAGNOSIS — C50.812 MALIGNANT NEOPLASM OF OVERLAPPING SITES OF LEFT BREAST IN FEMALE, ESTROGEN RECEPTOR POSITIVE: Primary | ICD-10-CM

## 2023-02-07 DIAGNOSIS — Z17.0 MALIGNANT NEOPLASM OF OVERLAPPING SITES OF LEFT BREAST IN FEMALE, ESTROGEN RECEPTOR POSITIVE: Primary | ICD-10-CM

## 2023-03-13 NOTE — PROGRESS NOTES
NAME: Jackie Erwin    : 1940    DATE:  03/15/23    DIAGNOSIS:   Left Breast Cancers:    -  Stage IIA (pT1cN1(sn)MX) moderately differentiated Invasive Ductal Carcinoma.             Tumor 20 mm in maximal dimension.              1/4 SLN w/ 6 mm metastasis without extranodal extension.                5% 1+ ER+, TX-, HER-2/Milena -.    -  Stage IIA  (pT1cN1(sn)MX) moderately differentiated Invasive Lobular Carcinoma.               Tumor 18 mm in maximal dimension.             1/4 SLN w/ 6 mm metastasis without extranodal extension.               100% 3+ ER+, 80% 2-3+ TX+, HER-2/Milena 10% 2+ by IHC,            equivocal by FISH, ultimately judged to be negative.    CHIEF COMPLAINT:  Follow up of Breast Cancer     HISTORY OF PRESENT ILLNESS:   Jackie Erwin is a very pleasant 83 y.o. female who is being seen today at the request of No ref. provider found for evaluation and treatment of newly diagnosed breast cancer.  She says she noticed a lump in her L breast and so presented for further evaluation. She saw her PCP and was referred for imaging and biopsy and then saw Dr. Saunders.  She had biopsies of 2 distincet areas in the L breast.  The first showed IDC Grade 2 which was weakly ER+ and TX/HER-2 negative.  The other was an ILC which was strongly ER/TX + and HER2 equivocal by IHC/FISH but ultimately judged to be negative.   On 12/15/21, Dr. Saunders took her for L simple mastectomy.  Final pathology also showed two separate tumors as below.  1/4 SLN showed a 6 mm metastasis without extranodal extension.      Ms. Erwin is currently healing well and denies any specific complaints. She reports having a post op fever of 102.4 and was taken to [facility] ED for evaluation. She completed 2 doses of IV antibiotics and no identifiable cause was found and she has remained afebrile. She has completed a course of both oral Keflex and oral Bactrim.  She hasn't had further fevers.  She complains of fatigue which has  worsened since her surgery but has started walking again.  She lost about 5 lbs but weight has since been stable.  She has frequent chronic cough (since developing [place] in [place] in 1986) but this is no worse than her baseline..  She denies chest pain or shortness of breath.  She denies abdominal pain, nausea/vomiting/diarrha.  She has on/off constipation.  No rectal bleeding.  She is otherwise well and without complaint.    INTERVAL HISTORY:   Ms. Erwin is here today for follow up of breast cancer.  Since she was here last, she has overall been well.  She continues to take Arimidex without difficulty.  She also continues to take calcium and vitamin D.  She has had some chronic difficulty with choking when she eats which she says she has had her whole life and so she is set up for evaluation in April.  She has not developed difficulty with aspiration pneumonias.  She had mammogram as below in November which showed no cause for concern.  Repeat exam was recommended after 1 year interval.  Given her Chek 2 mutation, Dr. Saunders is ordered earlier mammogram to be done in May 2023.     PAST MEDICAL HISTORY:  Past Medical History:   Diagnosis Date   • Arthritis    • Arthritis    • Cancer (HCC)     LEFT BREAST   • Carotid artery disease (HCC)     Right sided, s/p Right carotid endarterectomy 2011   • Diabetes mellitus (HCC)     controlled with diet   • Diabetic neuropathy (HCC)    • Disease of thyroid gland     hypothyroidism   • Elevated cholesterol    • Hyperlipidemia    • Hypertension    • Neuropathy     LOWER LEGS AND FEET   • Peripheral neuropathy     r/t neuropathy   • Spinal headache    • Stroke (HCC)    • TIA (transient ischemic attack) 2011   • Unsteady gait when walking        PAST SURGICAL HISTORY:  Past Surgical History:   Procedure Laterality Date   • ABDOMINAL SURGERY     • APPENDECTOMY      6 yrs old   • CAROTID ENDARTERECTOMY     • CAROTID ENDARTERECTOMY Right 2011   • COLONOSCOPY     •  HYSTERECTOMY  1969    age 29 done for dysmenorrhea   • INGUINAL HERNIA REPAIR     • MASTECTOMY WITH SENTINEL NODE BIOPSY AND AXILLARY NODE DISSECTION Left 12/15/2021    Procedure: BREAST MASTECTOMY WITH SENTINEL NODE BIOPSY;  Surgeon: Kaylen Saunders MD;  Location: [facility];  Service: General;  Laterality: Left;       FAMILY HISTORY:  Family History   Problem Relation Age of Onset   • Hypertension Mother    • Thrombosis Father    • Diabetes Maternal Grandmother    • Cancer Maternal Aunt         ?breast cancer, had mastectomy   • Cancer Maternal Cousin         colon   • Cancer Maternal Cousin         colon   • Cancer Maternal Cousin         colon   • Cancer Maternal Cousin         colon   • Breast cancer Neg Hx    2 siblings no cancer  4 other maternal aunts/uncles no cancer    SOCIAL HISTORY:  Social History     Socioeconomic History   • Marital status:    Tobacco Use   • Smoking status: Former     Packs/day: 0.50     Years: 20.00     Pack years: 10.00     Types: Cigarettes     Quit date:      Years since quittin.2   • Smokeless tobacco: Never   Vaping Use   • Vaping Use: Never used   Substance and Sexual Activity   • Alcohol use: No   • Drug use: No   • Sexual activity: Defer     Social History     Social History Narrative    lives in an assisted living facility [facility]    Worked in an office setting, 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  Significant findings as mentioned above.  All other systems reviewed and are negative.      MEDICATIONS:  The current medication list was reviewed in the EMR    Current Outpatient Medications:   •  anastrozole (ARIMIDEX) 1 MG tablet, Take 1 tablet by mouth Daily., Disp: 90 tablet, Rfl: 3  •  aspirin 81 MG EC tablet, Take 1 tablet by mouth every night at bedtime., Disp: , Rfl:   •  Calcium Carbonate-Vitamin D3 (Calcium 600+D3) 600-400  MG-UNIT tablet, Take 1 tablet by mouth 2 (Two) Times a Day., Disp: 60 tablet, Rfl: 5  •  HYDROcodone-acetaminophen (Norco) 7.5-325 MG per tablet, Take 1 tablet by mouth 4 (Four) Times a Day As Needed for Moderate Pain ., Disp: 15 tablet, Rfl: 0  •  Ibuprofen-diphenhydrAMINE HCl (Advil PM) 200-25 MG capsule, Take 1 capsule by mouth Every Night., Disp: , Rfl:   •  levothyroxine (SYNTHROID, LEVOTHROID) 150 MCG tablet, Take 1 tablet by mouth Daily., Disp: , Rfl:   •  multivitamin with minerals tablet tablet, Take 1 tablet by mouth Daily., Disp: , Rfl:   •  polyethyl glycol-propyl glycol (SYSTANE) 0.4-0.3 % solution ophthalmic solution (artificial tears), 1 drop Every 3 (Three) Hours As Needed., Disp: , Rfl:   •  potassium chloride (K-DUR,KLOR-CON) 10 MEQ CR tablet, Take 1 tablet by mouth Daily., Disp: 2 tablet, Rfl: 0  •  rosuvastatin (CRESTOR) 20 MG tablet, Take 1 tablet by mouth Every Night., Disp: , Rfl:   •  sodium chloride 0.65 % nasal spray, 1 spray into the nostril(s) as directed by provider As Needed for Congestion., Disp: , Rfl:   •  valsartan (DIOVAN) 160 MG tablet, Take 1 tablet by mouth Daily., Disp: , Rfl:   •  furosemide (LASIX) 20 MG tablet, Take 1 tablet by mouth Daily for 2 days., Disp: 2 tablet, Rfl: 0    ALLERGIES:    Allergies   Allergen Reactions   • Morphine Shortness Of Breath       PHYSICAL EXAM:  Vitals:    03/15/23 0920   BP: 151/66   Pulse: 73   Resp: 18   Temp: 97.5 °F (36.4 °C)   SpO2: 96%     Pain Score    03/15/23 0920   PainSc: 0-No pain     ECOG score: 0   General:  Awake, alert and oriented, in no distress.  Appears well.  Walks comfortably with a walker.  HEENT:  Pupils are equal, round and reactive to light and accommodation, Extra-ocular movements full, Oropharyx clear, mucous membranes moist  Neck:  No JVD, thyromegaly or lymphadenopathy  CV: regular rate and rhythm, no murmurs, rubs or gallops  Resp:  Lungs are clear to auscultation bilaterally, no wheezing.  Abd:  Soft,  non-tender, non-distended, bowel sounds present, no organomegaly or masses  Breast:  S/p L mastectomy.  Surgical scar is smooth without nodularity.  No left axillary adenopathy present.  R breast without palpable mass/lesions.  No right axillary adenopathy.  Ext:  No clubbing, cyanosis.  She has trace to 1+ bilateral lower extremity edema which is symmetric.  Lymph:  No cervical, supraclavicular, axillary, adenopathy  Neuro:  MS as above,  grossly non-focal exam      PATHOLOGY:  11-04-21            Genetic testing 11-08-21              12-15-21            ENDOSCOPY:        IMAGING:  Mammo Diagnostic Digital Tomosynthesis Bilateral With CAD with US Breast Left Limited 11-04-21  FINDINGS:  There are scattered areas of fibroglandular density.     Right breast: The fibroglandular pattern is unremarkable in appearance.  There are scattered benign-appearing calcifications. No dominant mass,  suspicious calcifications, or areas of architectural distortion are  seen.     Left breast: Correlating with the area the patient is feeling in the  left 12:00 to 1:00 region is an approximately 2.1 cm irregular isodense  mass with associated architectural distortion and coarse heterogeneous  calcifications. In the posterior left 3:00 region there is a second  irregular isodense mass containing faint calcifications. This area is  less discrete mammographically. Other scattered calcifications in the  left breast are noted. The remaining fibroglandular pattern of the left  breast is unremarkable.     Left breast ultrasound: Focused sonographic evaluation of the left  breast demonstrates a 2.0 cm irregular hypoechoic mass located at 12:00,  6 cm from the nipple, as well as a 1.9 cm irregular hypoechoic mass  located at 3:00, 6 cm from the nipple. Both are suspicious for  malignancy. Ultrasound of the left axilla demonstrates no abnormal  appearing axillary lymph nodes.     IMPRESSION:  1. Benign right mammographic findings.     2. 2.0 cm  mass in the left 12:00 region and 1.9 cm mass in the left 3:00  region which are both suspicious for malignancy. Recommend  ultrasound-guided core biopsy.        BI-RADS CATEGORY:  5, HIGHLY SUGGESTIVE OF MALIGNANCY        RECOMMENDATION:  Recommend ultrasound-guided core biopsy of masses in  the left 12:00 and left 3:00 regions.     CAD was utilized.        NM PET/CT Skull Base to Mid Thigh 11-19-21  PET/CT FINDINGS: The images obtained from the base of the skull to the  thoracic inlet showed no enlarged lymph nodes or masses in the neck.  There were no areas of increased glucose uptake. At the thoracic inlet,  the thyroid gland is enlarged. There is increased uptake in both the  left and right lobes of the thyroid gland, which does extend slightly  into the substernal position. There is no supraclavicular  lymphadenopathy. No axillary lymphadenopathy was seen. There is a lesion  in the left breast that measures 15 mm in diameter. It does show some  increased glucose uptake with a SUV of 3.25. There is a hiatal hernia od  the stomach in the lower mediastinum. There are increased interstitial  markings noted throughout the lungs. The liver was normal in size and  shape. No focal lesions were seen in the liver. No adrenal lesions were  identified. The aorta shows atherosclerotic plaque but no aneurysmal  dilatation. There is no adenopathy in the retroperitoneum or mesentery.  No abnormal bone uptake was identified.     IMPRESSION:  The thyroid gland is enlarged, extends substernally and does  show some increased glucose uptake. There is a well circumscribed lesion  in the upper portion of the left breast. It measures 15 mm in diameter  and is hypermetabolic with an SUV value of 3.25. This would be  consistent with the patient's breast cancer. At this time, there are no  PET fusion CT findings of metastatic disease.      DEXA Bone Density Axial 02-11-22  FINDINGS:      CURRENT: The BMD measured at the Right Femur Neck  is 0.706 gm/cm2 with a  T-score of -2.4.      PRIOR: The BMD measured at the Right Femur Total was 0.714 gm/cm2 with a  T-score of -2.3.         IMPRESSION:  The patient is considered to be osteopenic according to the World Health  Organization criteria. Fracture risk is moderate.  This represents  overall no significant change in bone mineral density.    US Venous Doppler Lower Extremity Left (duplex) (02/19/2022 11:07)  FINDINGS:  No evidence of a left lower extremity deep vein thrombosis. The common femoral vein through the popliteal vein are widely patent. There is normal compressibility with spontaneous and phasic waveforms. No calf vein thrombus. Greater saphenous vein is  patent.     IMPRESSION:  No deep venous thrombus in the left lower extremity.     Mammo Diagnostic Digital Tomosynthesis Right With CAD (11/15/2022 10:20)  FINDINGS: There are scattered areas of fibroglandular density.     The fibronodular pattern of the right breast is stable in appearance. A  few scattered calcifications are noted. No dominant mass, suspicious  calcifications, or areas of architectural distortion are seen.     IMPRESSION:  Benign right mammographic findings.     BI-RADS CATEGORY:  2, BENIGN     RECOMMENDATION:  Recommend the patient continue annual mammographic  evaluation of the right breast.     CAD was utilized.     The standard false-negative rate of mammography is between 10% and 25%.   Complex patterns or increased breast density will markedly elevate the  false-negative rate of mammography.        The patient was notified of the results and recommendations at the time  of her visit.  Additionally, a letter, in lay terminology, with the  results of this exam will be mailed to the patient.    RECENT LABS:  Lab Results   Component Value Date    WBC 5.78 03/15/2023    HGB 12.6 03/15/2023    HCT 38.6 03/15/2023    MCV 92.6 03/15/2023    RDW 13.4 03/15/2023     03/15/2023    NEUTRORELPCT 49.6 03/15/2023     LYMPHORELPCT 28.4 03/15/2023    MONORELPCT 12.8 (H) 03/15/2023    EOSRELPCT 7.6 (H) 03/15/2023    BASORELPCT 1.4 03/15/2023    NEUTROABS 2.87 03/15/2023    LYMPHSABS 1.64 03/15/2023       Lab Results   Component Value Date     (L) 03/15/2023    K 3.6 03/15/2023    CO2 25.7 03/15/2023    CL 99 03/15/2023    BUN 23 03/15/2023    CREATININE 0.69 03/15/2023    EGFRIFNONA 82 02/19/2022    GLUCOSE 126 (H) 03/15/2023    CALCIUM 9.9 03/15/2023    ALKPHOS 95 03/15/2023    AST 13 03/15/2023    ALT 9 03/15/2023    BILITOT 0.4 03/15/2023    ALBUMIN 3.8 03/15/2023    PROTEINTOT 6.9 03/15/2023    MG 2.1 02/19/2022    PHOS 3.5 08/19/2018     Lab Results   Component Value Date    ETMGLTFY48 386 01/12/2022    FOLATE 18.70 01/12/2022     Lab Results   Component Value Date    TSH 0.147 (L) 03/15/2023     Lab Results   Component Value Date     18.1 12/13/2021     Lab Results   Component Value Date    LABCA2 23.0 12/13/2021       ASSESSMENT & PLAN:  Jackie Erwin is a very pleasant 83 y.o. 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    1.  Left Breast Cancers:       -  Stage IIA (pT1cN1(sn)MX) moderately differentiated Invasive Ductal Carcinoma. Tumor 20 mm in maximal dimension.            1/4 SLN w/ 6 mm metastasis without extranodal extension.   5% 1+ ER+, ND-, HER-2/Milena -.       -  Stage IIA  (pT1cN1(sn)MX) moderately differentiated Invasive Lobular Carcinoma.  Tumor 18 mm in maximal dimension.          1/4 SLN w/ 6 mm metastasis without extranodal extension.  100% 3+ ER+, 80% 2-3+ ND+, HER-2/Milena 10% 2+ by IHC,            equivocal by FISH, ultimately judged to be negative.    -  Ms. Erwin is s/p successful resection via simple L mastectomy / SLNBx. We previously discussed that we would often discuss consideration of Oncotype/Mammaprint, but given her age and comorbidities, concerned that she would not tolerate chemotherapy well. She and her daughter were in agreement.   -   Given this,  recommended adjuvant hormonal therapy.  She started Arimidex and is tolerating it well.  -  Baseline DEXA scan (2-11-22) showed osteopenia with T score -2.3.  We discussed this at length.  Could  Switch Arimidex to Tamoxifen or continue Arimidex and add Ca/Vit D with or without Prolia.  After a lengthy discussion about the pros and cons to different alternatives, she decided she would like to continue Arimidex and will start Ca/Vit D. She declined Prolia therapy. Therefore, will plan to repeat DEXA after a 2 year interval.  This will be due after 2/11/2024.  -At present, clinically she continues to do well.  No concerning symptoms or exam findings today.   Exam/labs without cause for concern. Will continue with Arimidex and Ca/Vit D.  -  R mammogram 11/15/23 without cause for concern.  -She would normally need repeat right mammogram p 11/15/23.  Given her history of CHEK2 mutation as below, Dr. Saunders ordered earlier mammogram to be performed in May 2023.  -She will return to see me in 3 months with CBCD, CMP, TSH, Vit D.    2. CHEK-2 mutation:  -  Offered referral for genetic counseling, but she didn't want to do this.  Her family members are aware and are being tested appropriately.  - Increased frequency of mammography ordered by Dr. Saunders given her mutation.    3. Prophylaxis:  -  Had COVID vaccination + booster and bivalent booster.  -  Had 2022 influenza vaccination    6.  F/u:  -  Continue Arimidex and Ca/Vit D. (Declined Prolia)   - Return to see me in 3 months with CBCD, CMP, TSH, Vit D.  -  R mammogram due p 11/15/23.  Given CHEK2 mutation Dr. Saunders is ordered earlier mammogram to be performed in May 2023.  Increased frequency of screening is justified given her mutation.  -  DEXA due p 2-11-24    7.  ACO / NICHOLE/Other  Quality measures  -  Jackie Rip received 2022 flu vaccine.  -  Jackie Erwin reports a pain score of 0.  Given her pain assessment as noted, treatment options were discussed  and the following options were decided upon as a follow-up plan to address the patient's pain: No intervention needed at this time.  -  Current outpatient and discharge medications have been reconciled for the patient.  Reviewed by: Radha Mills MD      I spent 20 minutes with Jackie Erwin today.  In the office today, more than 50% of this time was spent face-to-face with her  in counseling / coordination of care, reviewing her medical history and counseling on the current treatment plan.  All questions were answered to her satisfaction      Electronically Signed by: Radha Mills MD

## 2023-03-15 ENCOUNTER — OFFICE VISIT (OUTPATIENT)
Dept: ONCOLOGY | Facility: CLINIC | Age: 83
End: 2023-03-15
Payer: MEDICARE

## 2023-03-15 ENCOUNTER — LAB (OUTPATIENT)
Dept: ONCOLOGY | Facility: CLINIC | Age: 83
End: 2023-03-15
Payer: MEDICARE

## 2023-03-15 VITALS
TEMPERATURE: 97.5 F | SYSTOLIC BLOOD PRESSURE: 151 MMHG | BODY MASS INDEX: 34.72 KG/M2 | WEIGHT: 203.4 LBS | DIASTOLIC BLOOD PRESSURE: 66 MMHG | HEIGHT: 64 IN | OXYGEN SATURATION: 96 % | HEART RATE: 73 BPM | RESPIRATION RATE: 18 BRPM

## 2023-03-15 DIAGNOSIS — C50.919 CHEK2-RELATED BREAST CANCER: ICD-10-CM

## 2023-03-15 DIAGNOSIS — M85.89 OSTEOPENIA OF MULTIPLE SITES: ICD-10-CM

## 2023-03-15 DIAGNOSIS — E55.9 VITAMIN D DEFICIENCY, UNSPECIFIED: ICD-10-CM

## 2023-03-15 DIAGNOSIS — R53.83 FATIGUE, UNSPECIFIED TYPE: Primary | ICD-10-CM

## 2023-03-15 DIAGNOSIS — Z17.0 MALIGNANT NEOPLASM OF OVERLAPPING SITES OF LEFT BREAST IN FEMALE, ESTROGEN RECEPTOR POSITIVE: Primary | ICD-10-CM

## 2023-03-15 DIAGNOSIS — C50.812 MALIGNANT NEOPLASM OF OVERLAPPING SITES OF LEFT BREAST IN FEMALE, ESTROGEN RECEPTOR POSITIVE: ICD-10-CM

## 2023-03-15 DIAGNOSIS — R53.83 FATIGUE, UNSPECIFIED TYPE: ICD-10-CM

## 2023-03-15 DIAGNOSIS — Z15.89 CHEK2-RELATED BREAST CANCER: ICD-10-CM

## 2023-03-15 DIAGNOSIS — C50.812 MALIGNANT NEOPLASM OF OVERLAPPING SITES OF LEFT BREAST IN FEMALE, ESTROGEN RECEPTOR POSITIVE: Primary | ICD-10-CM

## 2023-03-15 DIAGNOSIS — Z15.09 CHEK2-RELATED BREAST CANCER: ICD-10-CM

## 2023-03-15 DIAGNOSIS — Z17.0 MALIGNANT NEOPLASM OF OVERLAPPING SITES OF LEFT BREAST IN FEMALE, ESTROGEN RECEPTOR POSITIVE: ICD-10-CM

## 2023-03-15 DIAGNOSIS — Z15.02 CHEK2-RELATED BREAST CANCER: ICD-10-CM

## 2023-03-15 LAB
25(OH)D3 SERPL-MCNC: 25.2 NG/ML (ref 30–100)
ALBUMIN SERPL-MCNC: 3.8 G/DL (ref 3.5–5.2)
ALBUMIN/GLOB SERPL: 1.2 G/DL
ALP SERPL-CCNC: 95 U/L (ref 39–117)
ALT SERPL W P-5'-P-CCNC: 9 U/L (ref 1–33)
ANION GAP SERPL CALCULATED.3IONS-SCNC: 10.3 MMOL/L (ref 5–15)
AST SERPL-CCNC: 13 U/L (ref 1–32)
BASOPHILS # BLD AUTO: 0.08 10*3/MM3 (ref 0–0.2)
BASOPHILS NFR BLD AUTO: 1.4 % (ref 0–1.5)
BILIRUB SERPL-MCNC: 0.4 MG/DL (ref 0–1.2)
BUN SERPL-MCNC: 23 MG/DL (ref 8–23)
BUN/CREAT SERPL: 33.3 (ref 7–25)
CALCIUM SPEC-SCNC: 9.9 MG/DL (ref 8.6–10.5)
CHLORIDE SERPL-SCNC: 99 MMOL/L (ref 98–107)
CO2 SERPL-SCNC: 25.7 MMOL/L (ref 22–29)
CREAT SERPL-MCNC: 0.69 MG/DL (ref 0.57–1)
DEPRECATED RDW RBC AUTO: 45.1 FL (ref 37–54)
EGFRCR SERPLBLD CKD-EPI 2021: 85.2 ML/MIN/1.73
EOSINOPHIL # BLD AUTO: 0.44 10*3/MM3 (ref 0–0.4)
EOSINOPHIL NFR BLD AUTO: 7.6 % (ref 0.3–6.2)
ERYTHROCYTE [DISTWIDTH] IN BLOOD BY AUTOMATED COUNT: 13.4 % (ref 12.3–15.4)
GLOBULIN UR ELPH-MCNC: 3.1 GM/DL
GLUCOSE SERPL-MCNC: 126 MG/DL (ref 65–99)
HCT VFR BLD AUTO: 38.6 % (ref 34–46.6)
HGB BLD-MCNC: 12.6 G/DL (ref 12–15.9)
IMM GRANULOCYTES # BLD AUTO: 0.01 10*3/MM3 (ref 0–0.05)
IMM GRANULOCYTES NFR BLD AUTO: 0.2 % (ref 0–0.5)
LYMPHOCYTES # BLD AUTO: 1.64 10*3/MM3 (ref 0.7–3.1)
LYMPHOCYTES NFR BLD AUTO: 28.4 % (ref 19.6–45.3)
MCH RBC QN AUTO: 30.2 PG (ref 26.6–33)
MCHC RBC AUTO-ENTMCNC: 32.6 G/DL (ref 31.5–35.7)
MCV RBC AUTO: 92.6 FL (ref 79–97)
MONOCYTES # BLD AUTO: 0.74 10*3/MM3 (ref 0.1–0.9)
MONOCYTES NFR BLD AUTO: 12.8 % (ref 5–12)
NEUTROPHILS NFR BLD AUTO: 2.87 10*3/MM3 (ref 1.7–7)
NEUTROPHILS NFR BLD AUTO: 49.6 % (ref 42.7–76)
NRBC BLD AUTO-RTO: 0 /100 WBC (ref 0–0.2)
PLATELET # BLD AUTO: 307 10*3/MM3 (ref 140–450)
PMV BLD AUTO: 9.2 FL (ref 6–12)
POTASSIUM SERPL-SCNC: 3.6 MMOL/L (ref 3.5–5.2)
PROT SERPL-MCNC: 6.9 G/DL (ref 6–8.5)
RBC # BLD AUTO: 4.17 10*6/MM3 (ref 3.77–5.28)
SODIUM SERPL-SCNC: 135 MMOL/L (ref 136–145)
TSH SERPL DL<=0.05 MIU/L-ACNC: 0.15 UIU/ML (ref 0.27–4.2)
WBC NRBC COR # BLD: 5.78 10*3/MM3 (ref 3.4–10.8)

## 2023-03-15 PROCEDURE — 3077F SYST BP >= 140 MM HG: CPT | Performed by: INTERNAL MEDICINE

## 2023-03-15 PROCEDURE — 85025 COMPLETE CBC W/AUTO DIFF WBC: CPT | Performed by: INTERNAL MEDICINE

## 2023-03-15 PROCEDURE — 99213 OFFICE O/P EST LOW 20 MIN: CPT | Performed by: INTERNAL MEDICINE

## 2023-03-15 PROCEDURE — 3078F DIAST BP <80 MM HG: CPT | Performed by: INTERNAL MEDICINE

## 2023-03-15 PROCEDURE — 80053 COMPREHEN METABOLIC PANEL: CPT | Performed by: INTERNAL MEDICINE

## 2023-03-15 PROCEDURE — 1126F AMNT PAIN NOTED NONE PRSNT: CPT | Performed by: INTERNAL MEDICINE

## 2023-03-15 PROCEDURE — 82306 VITAMIN D 25 HYDROXY: CPT | Performed by: INTERNAL MEDICINE

## 2023-03-15 PROCEDURE — 84443 ASSAY THYROID STIM HORMONE: CPT | Performed by: INTERNAL MEDICINE

## 2023-03-15 PROCEDURE — 36415 COLL VENOUS BLD VENIPUNCTURE: CPT | Performed by: INTERNAL MEDICINE

## 2023-03-15 NOTE — PROGRESS NOTES
Venipuncture Blood Specimen Collection  Venipuncture performed in right arm by Jennifer Christy MA with good hemostasis. Patient tolerated the procedure well without complications.   03/15/23   Jennifer Christy MA

## 2023-03-16 RX ORDER — ERGOCALCIFEROL 1.25 MG/1
50000 CAPSULE ORAL WEEKLY
Qty: 4 CAPSULE | Refills: 5 | Status: SHIPPED | OUTPATIENT
Start: 2023-03-16

## 2023-03-22 RX ORDER — ANASTROZOLE 1 MG/1
1 TABLET ORAL DAILY
Qty: 90 TABLET | Refills: 3 | Status: SHIPPED | OUTPATIENT
Start: 2023-03-22

## 2023-04-06 DIAGNOSIS — Z17.0 MALIGNANT NEOPLASM OF OVERLAPPING SITES OF LEFT BREAST IN FEMALE, ESTROGEN RECEPTOR POSITIVE: Primary | ICD-10-CM

## 2023-04-06 DIAGNOSIS — C50.812 MALIGNANT NEOPLASM OF OVERLAPPING SITES OF LEFT BREAST IN FEMALE, ESTROGEN RECEPTOR POSITIVE: Primary | ICD-10-CM

## 2023-04-11 ENCOUNTER — HOSPITAL ENCOUNTER (OUTPATIENT)
Dept: MAMMOGRAPHY | Facility: HOSPITAL | Age: 83
Discharge: HOME | End: 2023-04-11
Admitting: SURGERY
Payer: MEDICARE

## 2023-04-11 DIAGNOSIS — Z17.0 MALIGNANT NEOPLASM OF OVERLAPPING SITES OF LEFT BREAST IN FEMALE, ESTROGEN RECEPTOR POSITIVE: ICD-10-CM

## 2023-04-11 DIAGNOSIS — C50.812 MALIGNANT NEOPLASM OF OVERLAPPING SITES OF LEFT BREAST IN FEMALE, ESTROGEN RECEPTOR POSITIVE: ICD-10-CM

## 2023-04-11 PROCEDURE — G0279 TOMOSYNTHESIS, MAMMO: HCPCS | Performed by: RADIOLOGY

## 2023-04-11 PROCEDURE — 77065 DX MAMMO INCL CAD UNI: CPT | Performed by: RADIOLOGY

## 2023-04-11 PROCEDURE — 77065 DX MAMMO INCL CAD UNI: CPT

## 2023-04-11 PROCEDURE — G0279 TOMOSYNTHESIS, MAMMO: HCPCS

## (undated) DEVICE — DRP UTIL 2/LAYR W/TP 15X26IN STRL PK/4

## (undated) DEVICE — JACKSON-PRATT 100CC BULB RESERVOIR: Brand: CARDINAL HEALTH

## (undated) DEVICE — HOLDER: Brand: DEROYAL

## (undated) DEVICE — 3M™ STERI-STRIP™ REINFORCED ADHESIVE SKIN CLOSURES, R1547, 1/2 IN X 4 IN (12 MM X 100 MM), 6 STRIPS/ENVELOPE: Brand: 3M™ STERI-STRIP™

## (undated) DEVICE — PROXIMATE RH ROTATING HEAD SKIN STAPLERS (35 WIDE) CONTAINS 35 STAINLESS STEEL STAPLES: Brand: PROXIMATE

## (undated) DEVICE — DRAPE,T,LAPARO,TRANS,STERILE: Brand: MEDLINE

## (undated) DEVICE — SUT MNCRYL 4/0 PS2 18 IN

## (undated) DEVICE — GLV SURG PREMIERPRO MIC LTX PF SZ7 BRN

## (undated) DEVICE — PATIENT RETURN ELECTRODE, SINGLE-USE, CONTACT QUALITY MONITORING, ADULT, WITH 9FT CORD, FOR PATIENTS WEIGING OVER 33LBS. (15KG): Brand: MEGADYNE

## (undated) DEVICE — CONTAINER,SPECIMEN,OR STERILE,4OZ: Brand: MEDLINE

## (undated) DEVICE — PENCL ES MEGADINE EZ/CLEAN BUTN W/HOLSTR 10FT

## (undated) DEVICE — DRP SURG UNIV BASIC BILAMINATE 53X77IN DISP

## (undated) DEVICE — ELECTRD NDL MEGADYNE EZCLEAN NOSE 7CM

## (undated) DEVICE — DRN WND HUBLSS FLUT FULL PERF SIL10MM

## (undated) DEVICE — SPNG LAP PREWSH SFTPK 18X18IN STRL PK/5

## (undated) DEVICE — ADHS LIQ MASTISOL 2/3ML

## (undated) DEVICE — SUT SILK 0 FSL 18IN 678G

## (undated) DEVICE — BNDG ADHS CURAD FLX/FABRC 2X4IN STRL LF

## (undated) DEVICE — STCKNT IMPERV 9X36IN STRL

## (undated) DEVICE — SUT VIC 3/0 SH 27IN J416H

## (undated) DEVICE — PICO 7 10CM X 30CM: Brand: PICO™ 7

## (undated) DEVICE — PK BASIC 70

## (undated) DEVICE — ROSEBUD DISSECTORS: Brand: DEROYAL

## (undated) DEVICE — CVR PROB 96IN LF STRL

## (undated) DEVICE — BNDG ELAS CO-FLEX SLF ADHR 4IN5YD LF STRL

## (undated) DEVICE — APPL CHLORAPREP HI/LITE 26ML ORNG

## (undated) DEVICE — ANTIBACTERIAL UNDYED BRAIDED (POLYGLACTIN 910), SYNTHETIC ABSORBABLE SUTURE: Brand: COATED VICRYL

## (undated) DEVICE — INTENDED FOR TISSUE SEPARATION, AND OTHER PROCEDURES THAT REQUIRE A SHARP SURGICAL BLADE TO PUNCTURE OR CUT.: Brand: BARD-PARKER ® STAINLESS STEEL BLADES